# Patient Record
Sex: FEMALE | Race: WHITE | NOT HISPANIC OR LATINO | Employment: FULL TIME | URBAN - METROPOLITAN AREA
[De-identification: names, ages, dates, MRNs, and addresses within clinical notes are randomized per-mention and may not be internally consistent; named-entity substitution may affect disease eponyms.]

---

## 2017-02-15 RX ORDER — DILTIAZEM HYDROCHLORIDE 360 MG/1
CAPSULE, EXTENDED RELEASE ORAL DAILY
Qty: 90 CAPSULE | Refills: 0 | Status: CANCELLED | OUTPATIENT
Start: 2017-02-15

## 2017-02-15 RX ORDER — LEVOTHYROXINE SODIUM 125 UG/1
125 TABLET ORAL DAILY
Qty: 90 TABLET | Refills: 0 | Status: CANCELLED | OUTPATIENT
Start: 2017-02-15

## 2017-02-15 RX ORDER — HYDROCHLOROTHIAZIDE 25 MG/1
25 TABLET ORAL DAILY
Qty: 90 TABLET | Refills: 0 | Status: CANCELLED | OUTPATIENT
Start: 2017-02-15

## 2017-02-15 NOTE — TELEPHONE ENCOUNTER
I have not seen this patient for these conditions. She was seen here for a hip issue and vertigo because she was unable to get an appointment with her PCP at [facility] at the time. I am happy to see her in the clinic if she would like to make an appointment to address these issues. She is due for Pap, mammogram and health maintenance update also.   She will need to have lab tests and appointments in order to address appropriately. Kristen Kehr PA-C

## 2017-02-15 NOTE — TELEPHONE ENCOUNTER
It does not look like Kristen Kehr PA has prescribed the diltiazem or hctz in past.  I called pharmacy and they said pt advised them Kristen Kehr PA is her doctor and to send refills to her.  Concha Martin RN

## 2017-03-01 PROBLEM — Z86.0100 HISTORY OF COLONIC POLYPS: Status: ACTIVE | Noted: 2017-03-01

## 2017-03-22 ENCOUNTER — TELEPHONE (OUTPATIENT)
Dept: FAMILY MEDICINE | Facility: CLINIC | Age: 57
End: 2017-03-22

## 2017-03-22 NOTE — LETTER
[facility]  [address] [facility] [address] [phone]      March 22, 2017      Wendy Dodd  [address] [facility] [address]              Dear Wendy,      Our records indicate that you have not scheduled for a(n)annual female exam/pap which was recommended by your health care team. Monitoring and managing your preventative and chronic health conditions are very important to us.       If you have received your health care elsewhere, please provide us with that information so it can be documented in your chart.    Please call [phone] or message us through your [employer] account to schedule an appointment or provide information for your chart.     I look forward to seeing you and working with you on your health care needs.     Sincerely,       Demetrius SOLIS MA          *If you have already scheduled an appointment, please disregard this reminder

## 2017-03-22 NOTE — TELEPHONE ENCOUNTER
Panel Management Review      Patient has the following on her problem list: None      Composite cancer screening  Chart review shows that this patient is due/due soon for the following Pap Smear  Summary:    Patient is due/failing the following:   PAP and PHYSICAL    Action needed:   Patient needs office visit for physical/pap.    Type of outreach:    Sent letter.    Questions for provider review:    None                                                                                                                                    Demetrius SOLIS MA       Chart routed to close .

## 2017-03-27 ENCOUNTER — OFFICE VISIT (OUTPATIENT)
Dept: FAMILY MEDICINE | Facility: CLINIC | Age: 57
End: 2017-03-27
Payer: COMMERCIAL

## 2017-03-27 VITALS
TEMPERATURE: 98.2 F | OXYGEN SATURATION: 98 % | SYSTOLIC BLOOD PRESSURE: 120 MMHG | HEIGHT: 68 IN | DIASTOLIC BLOOD PRESSURE: 79 MMHG | HEART RATE: 72 BPM | WEIGHT: 246 LBS | BODY MASS INDEX: 37.28 KG/M2

## 2017-03-27 DIAGNOSIS — E03.9 HYPOTHYROIDISM, UNSPECIFIED TYPE: ICD-10-CM

## 2017-03-27 DIAGNOSIS — Z13.220 LIPID SCREENING: ICD-10-CM

## 2017-03-27 DIAGNOSIS — Z11.59 NEED FOR HEPATITIS C SCREENING TEST: ICD-10-CM

## 2017-03-27 DIAGNOSIS — Z00.00 ENCOUNTER FOR ROUTINE ADULT HEALTH EXAMINATION WITHOUT ABNORMAL FINDINGS: Primary | ICD-10-CM

## 2017-03-27 DIAGNOSIS — Z12.4 SCREENING FOR MALIGNANT NEOPLASM OF CERVIX: ICD-10-CM

## 2017-03-27 DIAGNOSIS — R73.01 IMPAIRED FASTING GLUCOSE: ICD-10-CM

## 2017-03-27 DIAGNOSIS — Z12.31 VISIT FOR SCREENING MAMMOGRAM: ICD-10-CM

## 2017-03-27 DIAGNOSIS — I10 BENIGN ESSENTIAL HYPERTENSION: ICD-10-CM

## 2017-03-27 PROBLEM — E66.09 NON MORBID OBESITY DUE TO EXCESS CALORIES: Status: ACTIVE | Noted: 2017-03-27

## 2017-03-27 LAB
ALBUMIN SERPL-MCNC: 3.9 G/DL (ref 3.4–5)
ANION GAP SERPL CALCULATED.3IONS-SCNC: 4 MMOL/L (ref 3–14)
BUN SERPL-MCNC: 14 MG/DL (ref 7–30)
CALCIUM SERPL-MCNC: 10.3 MG/DL (ref 8.5–10.1)
CHLORIDE SERPL-SCNC: 103 MMOL/L (ref 94–109)
CHOLEST SERPL-MCNC: 181 MG/DL
CO2 SERPL-SCNC: 32 MMOL/L (ref 20–32)
CREAT SERPL-MCNC: 0.63 MG/DL (ref 0.52–1.04)
GFR SERPL CREATININE-BSD FRML MDRD: ABNORMAL ML/MIN/1.7M2
GLUCOSE SERPL-MCNC: 152 MG/DL (ref 70–99)
HDLC SERPL-MCNC: 59 MG/DL
LDLC SERPL CALC-MCNC: 91 MG/DL
NONHDLC SERPL-MCNC: 122 MG/DL
PHOSPHATE SERPL-MCNC: 3 MG/DL (ref 2.5–4.5)
POTASSIUM SERPL-SCNC: 4.5 MMOL/L (ref 3.4–5.3)
SODIUM SERPL-SCNC: 139 MMOL/L (ref 133–144)
TRIGL SERPL-MCNC: 155 MG/DL
TSH SERPL DL<=0.005 MIU/L-ACNC: 1.8 MU/L (ref 0.4–4)

## 2017-03-27 PROCEDURE — 87624 HPV HI-RISK TYP POOLED RSLT: CPT | Performed by: PHYSICIAN ASSISTANT

## 2017-03-27 PROCEDURE — G0145 SCR C/V CYTO,THINLAYER,RESCR: HCPCS | Performed by: PHYSICIAN ASSISTANT

## 2017-03-27 PROCEDURE — 99396 PREV VISIT EST AGE 40-64: CPT | Performed by: PHYSICIAN ASSISTANT

## 2017-03-27 PROCEDURE — 86803 HEPATITIS C AB TEST: CPT | Performed by: PHYSICIAN ASSISTANT

## 2017-03-27 PROCEDURE — 36415 COLL VENOUS BLD VENIPUNCTURE: CPT | Performed by: PHYSICIAN ASSISTANT

## 2017-03-27 PROCEDURE — 84443 ASSAY THYROID STIM HORMONE: CPT | Performed by: PHYSICIAN ASSISTANT

## 2017-03-27 PROCEDURE — 80061 LIPID PANEL: CPT | Performed by: PHYSICIAN ASSISTANT

## 2017-03-27 PROCEDURE — 80069 RENAL FUNCTION PANEL: CPT | Performed by: PHYSICIAN ASSISTANT

## 2017-03-27 NOTE — PROGRESS NOTES
SUBJECTIVE:     CC: Wendy Dodd is an 56 year old woman who presents for preventive health visit.     She was formerly established with a primary provider in [place] and they were prescribing her medication. She is due for a routine exam today and will eventually need refills of her medications and switching to [place] for primary care.       Healthy Habits:    Do you get at least three servings of calcium containing foods daily (dairy, green leafy vegetables, etc.)? yes and no, taking calcium and/or vitamin D supplement:     Amount of exercise or daily activities, outside of work: none    Problems taking medications regularly No    Medication side effects: No    Have you had an eye exam in the past two years? no    Do you see a dentist twice per year? yes    Do you have sleep apnea, excessive snoring or daytime drowsiness?          Today's PHQ-2 Score:   PHQ-2 ( 1999 Pf[employer]) 3/27/2017 4/21/2016   Q1: Little interest or pleasure in doing things 0 0   Q2: Feeling down, depressed or hopeless 0 0   PHQ-2 Score 0 0       Abuse: Current or Past(Physical, Sexual or Emotional)- No  Do you feel safe in your environment - Yes    Social History   Substance Use Topics     Smoking status: Current Every Day Smoker     Types: Other     Smokeless tobacco: Never Used      Comment: Using E-cigs to quit     Alcohol use 0.0 oz/week     0 Standard drinks or equivalent per week     The patient does not drink >3 drinks per day nor >7 drinks per week.    No results for input(s): CHOL, HDL, LDL, TRIG, CHOLHDLRATIO, NHDL in the last 29220 hours.    Reviewed orders with patient.  Reviewed health maintenance and updated orders accordingly - Yes    Mammo Decision Support:  Patient over age 50, mutual decision to screen reflected in health maintenance.    Pertinent mammograms are reviewed under the imaging tab.  History of abnormal Pap smear: NO - age 30- 65 PAP every 3 years recommended  Last 3 Pap Results: No results found for:  PAP    Reviewed and updated as needed this visit by clinical staff  Tobacco  Allergies  Meds  Med Hx  Surg Hx  Fam Hx  Soc Hx        Reviewed and updated as needed this visit by Provider        History reviewed. No pertinent past medical history.   History reviewed. No pertinent surgical history.    ROS:  C: NEGATIVE for fever, chills, change in weight  I: NEGATIVE for worrisome rashes, moles or lesions  E: NEGATIVE for vision changes or irritation  ENT: NEGATIVE for ear, mouth and throat problems  R: NEGATIVE for significant cough or SOB  B: NEGATIVE for masses, tenderness or discharge  CV: NEGATIVE for chest pain, palpitations or peripheral edema  GI: NEGATIVE for nausea, abdominal pain, heartburn, or change in bowel habits  : NEGATIVE for unusual urinary or vaginal symptoms. No vaginal bleeding.  M: NEGATIVE for significant arthralgias or myalgia  N: NEGATIVE for weakness, dizziness or paresthesias  E: NEGATIVE for temperature intolerance, skin/hair changes  P: NEGATIVE for changes in mood or affect     BP Readings from Last 3 Encounters:   03/27/17 120/79   11/30/16 116/81   04/21/16 114/75    Wt Readings from Last 3 Encounters:   03/27/17 246 lb (111.6 kg)   11/30/16 237 lb (107.5 kg)   04/21/16 225 lb (102.1 kg)                  Patient Active Problem List   Diagnosis     Advanced directives, counseling/discussion     Diverticulosis of large intestine     External hemorrhoids     History of colonic polyps     Benign essential hypertension     Hypothyroidism, unspecified type     History reviewed. No pertinent surgical history.    Social History   Substance Use Topics     Smoking status: Current Every Day Smoker     Types: Other     Smokeless tobacco: Never Used      Comment: Using E-cigs to quit     Alcohol use 0.0 oz/week     0 Standard drinks or equivalent per week     Family History   Problem Relation Age of Onset     Colon Cancer Father      Other Cancer Father      liver     Other Cancer Brother   "    Skin Cancer Sister          Current Outpatient Prescriptions   Medication Sig Dispense Refill     levothyroxine (SYNTHROID, LEVOTHROID) 125 MCG tablet Take 125 mcg by mouth daily       folic acid 800 MCG TABS        IRON PO        diltiazem (TIAZAC) 360 MG 24 hr ER beaded capsule Take by mouth daily       HYDROCHLOROTHIAZIDE PO Take 25 mg by mouth       lisinopril-hydrochlorothiazide (PRINZIDE,ZESTORETIC) 10-12.5 MG per tablet Take 1 tablet by mouth daily       OBJECTIVE:     /79 (Cuff Size: Adult Large)  Pulse 72  Temp 98.2  F (36.8  C) (Oral)  Ht 5' 8\" (1.727 m)  Wt 246 lb (111.6 kg)  SpO2 98%  BMI 37.4 kg/m2  EXAM:  GENERAL APPEARANCE: healthy, alert and no distress  EYES: Eyes grossly normal to inspection, PERRL and conjunctivae and sclerae normal  HENT: ear canals and TM's normal, nose and mouth without ulcers or lesions, oropharynx clear and oral mucous membranes moist  NECK: no adenopathy, no asymmetry, masses, or scars and thyroid normal to palpation  RESP: lungs clear to auscultation - no rales, rhonchi or wheezes  BREAST: normal without masses, tenderness or nipple discharge and no palpable axillary masses or adenopathy  CV: regular rate and rhythm, normal S1 S2, no S3 or S4, no murmur, click or rub, no peripheral edema and peripheral pulses strong  ABDOMEN: soft, nontender, no hepatosplenomegaly, no masses and bowel sounds normal   (female): normal female external genitalia, normal urethral meatus, vaginal mucosal atrophy noted, normal cervix, adnexae, and uterus without masses or abnormal discharge  MS: no musculoskeletal defects are noted and gait is age appropriate without ataxia  SKIN: no suspicious lesions or rashes  NEURO: Normal strength and tone, sensory exam grossly normal, mentation intact and speech normal  PSYCH: mentation appears normal and affect normal/bright    ASSESSMENT/PLAN:     1. Encounter for routine adult health examination without abnormal findings  Health " "maintenance reviewed and updated.    2. Visit for screening mammogram  - MA SCREENING DIGITAL BILAT - Future  (s+30); Future    3. Need for hepatitis C screening test  - Hepatitis C Screen Reflex to HCV RNA Quant and Genotype    4. Screening for malignant neoplasm of cervix  - Pap imaged thin layer screen with HPV - recommended age 30 - 65  - HPV High Risk Types DNA Cervical    5. Benign essential hypertension  Due for renal panel. She does not need refills of her medications today.   She will contact the pharmacy for refills. Blood pressure is controlled.   She is taking HCTZ, lisinopril and diltiazem  - Renal panel    6. Hypothyroidism, unspecified type  Due for thyroid testing today. Declines refills of her medication at this time since she has medication at home right now.   - TSH with free T4 reflex    7. Lipid screening  - LIPID REFLEX TO DIRECT LDL PANEL    COUNSELING:   Reviewed preventive health counseling, as reflected in patient instructions       Regular exercise       Healthy diet/nutrition       Colon cancer screening       reports that she has been smoking Other.  She has never used smokeless tobacco.    Estimated body mass index is 37.4 kg/(m^2) as calculated from the following:    Height as of this encounter: 5' 8\" (1.727 m).    Weight as of this encounter: 246 lb (111.6 kg).   Weight management plan: Discussed healthy diet and exercise guidelines and patient will follow up in 12 months in clinic to re-evaluate.    Counseling Resources:  ATP IV Guidelines  Pooled Cohorts Equation Calculator  Breast Cancer Risk Calculator  FRAX Risk Assessment  ICSI Preventive Guidelines  Dietary Guidelines for Americans, 2010  USDA's MyPlate  ASA Prophylaxis  Lung CA Screening    Kristen M. Kehr, PA-C  [facility]  "

## 2017-03-27 NOTE — NURSING NOTE
"Chief Complaint   Patient presents with     Physical       Initial /79 (Cuff Size: Adult Large)  Pulse 72  Temp 98.2  F (36.8  C) (Oral)  Ht 5' 8\" (1.727 m)  Wt 246 lb (111.6 kg)  SpO2 98%  BMI 37.4 kg/m2 Estimated body mass index is 37.4 kg/(m^2) as calculated from the following:    Height as of this encounter: 5' 8\" (1.727 m).    Weight as of this encounter: 246 lb (111.6 kg).  Medication Reconciliation: complete    STEPHANIE Raymundo MA    "

## 2017-03-27 NOTE — MR AVS SNAPSHOT
After Visit Summary   3/27/2017    Wendy Dodd    MRN: [id number]           Patient Information     Date Of Birth          1960        Visit Information        Provider Department      3/27/2017 9:20 AM Kehr, Kristen M, PA-C [facility]        Today's Diagnoses     Encounter for routine adult health examination without abnormal findings    -  1    Visit for screening mammogram        Need for hepatitis C screening test        Screening for malignant neoplasm of cervix        Benign essential hypertension        Hypothyroidism, unspecified type        Lipid screening          Care Instructions      Preventive Health Recommendations  Female Ages 50 - 64    Yearly exam: See your health care provider every year in order to  o Review health changes.   o Discuss preventive care.    o Review your medicines if your doctor has prescribed any.      Get a Pap test every three years (unless you have an abnormal result and your provider advises testing more often).    If you get Pap tests with HPV test, you only need to test every 5 years, unless you have an abnormal result.     You do not need a Pap test if your uterus was removed (hysterectomy) and you have not had cancer.    You should be tested each year for STDs (sexually transmitted diseases) if you're at risk.     Have a mammogram every 1 to 2 years.    Have a colonoscopy at age 50, or have a yearly FIT test (stool test). These exams screen for colon cancer.      Have a cholesterol test every 5 years, or more often if advised.    Have a diabetes test (fasting glucose) every three years. If you are at risk for diabetes, you should have this test more often.     If you are at risk for osteoporosis (brittle bone disease), think about having a bone density scan (DEXA).    Shots: Get a flu shot each year. Get a tetanus shot every 10 years.    Nutrition:     Eat at least 5 servings of fruits and vegetables each day.    Eat whole-grain  "bread, whole-wheat pasta and brown rice instead of white grains and rice.    Talk to your provider about Calcium and Vitamin D.     Lifestyle    Exercise at least 150 minutes a week (30 minutes a day, 5 days a week). This will help you control your weight and prevent disease.    Limit alcohol to one drink per day.    No smoking.     Wear sunscreen to prevent skin cancer.     See your dentist every six months for an exam and cleaning.    See your eye doctor every 1 to 2 years.      Schedule mammogram    Fasting lab tests done today        Follow-ups after your visit        Future tests that were ordered for you today     Open Future Orders        Priority Expected Expires Ordered    MA SCREENING DIGITAL BILAT - Future  (s+30) Routine  3/27/2018 3/27/2017            Who to contact     If you have questions or need follow up information about today's clinic visit or your schedule please contact [facility] directly at [phone].  Normal or non-critical lab and imaging results will be communicated to you by [employer], letter or phone within 4 business days after the clinic has received the results. If you do not hear from us within 7 days, please contact the clinic through [employer] or phone. If you have a critical or abnormal lab result, we will notify you by phone as soon as possible.  Submit refill requests through [employer] or call your pharmacy and they will forward the refill request to us. Please allow 3 business days for your refill to be completed.          Additional Information About Your Visit        RSensharPlanG Information     [employer] lets you send messages to your doctor, view your test results, renew your prescriptions, schedule appointments and more. To sign up, go to [email or web address] . Click on \"Log in\" on the left side of the screen, which will take you to the Welcome page. Then click on \"Sign up Now\" on the right side of the page.     You will be asked to enter the access code listed " "below, as well as some personal information. Please follow the directions to create your username and password.     Your access code is: [email or web address]  Expires: 2017  9:46 AM     Your access code will  in 90 days. If you need help or a new code, please call your [place] clinic or [phone].        Care EveryWhere ID     This is your Care EveryWhere ID. This could be used by other organizations to access your [place] medical records  [id number]        Your Vitals Were     Pulse Temperature Height Pulse Oximetry BMI (Body Mass Index)       72 98.2  F (36.8  C) (Oral) 5' 8\" (1.727 m) 98% 37.4 kg/m2        Blood Pressure from Last 3 Encounters:   17 120/79   16 116/81   16 114/75    Weight from Last 3 Encounters:   17 246 lb (111.6 kg)   16 237 lb (107.5 kg)   16 225 lb (102.1 kg)              We Performed the Following     Hepatitis C Screen Reflex to HCV RNA Quant and Genotype     HPV High Risk Types DNA Cervical     LIPID REFLEX TO DIRECT LDL PANEL     Pap imaged thin layer screen with HPV - recommended age 30 - 65     Renal panel     TSH with free T4 reflex        Primary Care Provider Office Phone # Fax #    Kristen M Kehr, PA-C [phone]       [facility] [address] [facility] [address]        Thank you!     Thank you for choosing [facility]  for your care. Our goal is always to provide you with excellent care. Hearing back from our patients is one way we can continue to improve our services. Please take a few minutes to complete the written survey that you may receive in the mail after your visit with us. Thank you!             Your Updated Medication List - Protect others around you: Learn how to safely use, store and throw away your medicines at www.disposemymeds.org.          This list is accurate as of: 3/27/17  9:46 AM.  Always use your most recent med list.                   Brand Name Dispense Instructions " for use    diltiazem 360 MG 24 hr capsule    TIAZAC     Take by mouth daily       folic acid 800 MCG Tabs          HYDROCHLOROTHIAZIDE PO      Take 25 mg by mouth       IRON PO          levothyroxine 125 MCG tablet    SYNTHROID/LEVOTHROID     Take 125 mcg by mouth daily       lisinopril-hydrochlorothiazide 10-12.5 MG per tablet    PRINZIDE/ZESTORETIC     Take 1 tablet by mouth daily

## 2017-03-27 NOTE — LETTER
April 4, 2017    Wendy Dodd  [address]   [place] [address]    Dear Wendy,  We are happy to inform you that your PAP smear result from 3/27/17 is normal.  We are now able to do a follow up test on PAP smears. The DNA test is for HPV (Human Papilloma Virus). Cervical cancer is closely linked with certain types of HPV. Your result showed no evidence of high risk HPV.  Therefore we recommend you return in 3 years for your next pap smear.  You will still need to return to the clinic every year for an annual exam and other preventive tests.  Please contact the clinic at [phone] with any questions.  Sincerely,    Kristen M. Kehr, PA-C/kylee

## 2017-03-27 NOTE — PATIENT INSTRUCTIONS

## 2017-03-28 DIAGNOSIS — R73.01 ELEVATED FASTING BLOOD SUGAR: Primary | ICD-10-CM

## 2017-03-28 LAB — HCV AB SERPL QL IA: NORMAL

## 2017-03-28 NOTE — PROGRESS NOTES
I ordered an A1C to see if this could be added to lab tests drawn 3/27. Could you please check with the lab to see if this can be done.  Otherwise she will need to come back for this test.

## 2017-03-28 NOTE — PROGRESS NOTES
Please contact this patient and let her know that all results look good except for her blood sugar. It is elevated at 152. She will need another test to check for diabetes called a hemoglobin A1C. She has a mammogram on 4/13 and I think it would be fine to coordinate with that if it is here in [place]. She does not need to be fasting, but will need a lab only appointment that day. Thank you. Kristen Kehr PA-C  March 28, 2017

## 2017-03-29 LAB
COPATH REPORT: NORMAL
PAP: NORMAL

## 2017-03-30 LAB
FINAL DIAGNOSIS: NORMAL
HPV HR 12 DNA CVX QL NAA+PROBE: NEGATIVE
HPV16 DNA SPEC QL NAA+PROBE: NEGATIVE
HPV18 DNA SPEC QL NAA+PROBE: NEGATIVE
SPECIMEN DESCRIPTION: NORMAL

## 2017-04-13 ENCOUNTER — RADIANT APPOINTMENT (OUTPATIENT)
Dept: MAMMOGRAPHY | Facility: CLINIC | Age: 57
End: 2017-04-13
Attending: PHYSICIAN ASSISTANT
Payer: COMMERCIAL

## 2017-04-13 DIAGNOSIS — R73.01 IMPAIRED FASTING GLUCOSE: ICD-10-CM

## 2017-04-13 DIAGNOSIS — Z12.31 VISIT FOR SCREENING MAMMOGRAM: ICD-10-CM

## 2017-04-13 LAB — HBA1C MFR BLD: 8.9 % (ref 4.3–6)

## 2017-04-13 PROCEDURE — 83036 HEMOGLOBIN GLYCOSYLATED A1C: CPT | Performed by: PHYSICIAN ASSISTANT

## 2017-04-13 PROCEDURE — 36415 COLL VENOUS BLD VENIPUNCTURE: CPT | Performed by: PHYSICIAN ASSISTANT

## 2017-04-13 PROCEDURE — G0202 SCR MAMMO BI INCL CAD: HCPCS | Mod: TC

## 2017-04-17 NOTE — PROGRESS NOTES
Please contact Wendy and let her know that her Hemoglobin A1C test that she had done shows that she has type 2 diabetes.   Please help her make an appointment with me to discuss medications and follow up.   Thank you.   Kristen Kehr PA-C

## 2017-04-20 ENCOUNTER — OFFICE VISIT (OUTPATIENT)
Dept: FAMILY MEDICINE | Facility: CLINIC | Age: 57
End: 2017-04-20
Payer: COMMERCIAL

## 2017-04-20 VITALS
DIASTOLIC BLOOD PRESSURE: 72 MMHG | BODY MASS INDEX: 37.4 KG/M2 | HEART RATE: 87 BPM | WEIGHT: 246 LBS | SYSTOLIC BLOOD PRESSURE: 116 MMHG | TEMPERATURE: 98.6 F | OXYGEN SATURATION: 95 %

## 2017-04-20 DIAGNOSIS — Z23 NEED FOR PNEUMOCOCCAL VACCINE: ICD-10-CM

## 2017-04-20 DIAGNOSIS — E11.9 TYPE 2 DIABETES MELLITUS WITHOUT COMPLICATION, WITHOUT LONG-TERM CURRENT USE OF INSULIN (H): Primary | ICD-10-CM

## 2017-04-20 PROCEDURE — 90732 PPSV23 VACC 2 YRS+ SUBQ/IM: CPT | Performed by: PHYSICIAN ASSISTANT

## 2017-04-20 PROCEDURE — 99214 OFFICE O/P EST MOD 30 MIN: CPT | Mod: 25 | Performed by: PHYSICIAN ASSISTANT

## 2017-04-20 PROCEDURE — 90471 IMMUNIZATION ADMIN: CPT | Performed by: PHYSICIAN ASSISTANT

## 2017-04-20 RX ORDER — METFORMIN HCL 500 MG
500 TABLET, EXTENDED RELEASE 24 HR ORAL
Qty: 180 TABLET | Refills: 1 | Status: SHIPPED | OUTPATIENT
Start: 2017-04-20 | End: 2017-04-21

## 2017-04-20 RX ORDER — ATORVASTATIN CALCIUM 10 MG/1
10 TABLET, FILM COATED ORAL DAILY
Qty: 90 TABLET | Refills: 1 | Status: SHIPPED | OUTPATIENT
Start: 2017-04-20 | End: 2017-10-18

## 2017-04-20 RX ORDER — LANCETS
EACH MISCELLANEOUS
Qty: 1 BOX | Refills: 11 | Status: SHIPPED | OUTPATIENT
Start: 2017-04-20 | End: 2018-03-12

## 2017-04-20 NOTE — MR AVS SNAPSHOT
After Visit Summary   4/20/2017    Wendy Dodd    MRN: [id number]           Patient Information     Date Of Birth          1960        Visit Information        Provider Department      4/20/2017 8:50 AM Kehr, Kristen M, PA-C [facility]        Today's Diagnoses     Type 2 diabetes mellitus without complication, without long-term current use of insulin (H)    -  1    Need for pneumococcal vaccine          Care Instructions      Appointment with Kristen Kehr PA-C in 3 months    Start the metformin twice daily   Lipitor once daily  Aspirin once daily      Fasting lab tests prior to appointment in 3 months.     MyChart sign up.       Pneumonia vaccine today      Make an appointment with Taylor in Diabetes Education          Follow-ups after your visit        Your next 10 appointments already scheduled     Jul 20, 2017  8:30 AM CDT   Office Visit with Kristen M Kehr, PA-C   [facility] ([facility]    [address] [address] [facility] [address] [phone]           Bring a current list of meds and any records pertaining to this visit.  For Physicals, please bring immunization records and any forms needing to be filled out.  Please arrive 10 minutes early to complete paperwork.              Who to contact     If you have questions or need follow up information about today's clinic visit or your schedule please contact [facility] directly at [phone].  Normal or non-critical lab and imaging results will be communicated to you by MyChart, letter or phone within 4 business days after the clinic has received the results. If you do not hear from us within 7 days, please contact the clinic through MyChart or phone. If you have a critical or abnormal lab result, we will notify you by phone as soon as possible.  Submit refill requests through [facility] or call your pharmacy and they will forward the refill request to us. Please allow 3 business  days for your refill to be completed.          Additional Information About Your Visit        [employer] Information     [employer] gives you secure access to your electronic health record. If you see a primary care provider, you can also send messages to your care team and make appointments. If you have questions, please call your primary care clinic.  If you do not have a primary care provider, please call [phone] and they will assist you.        Care EveryWhere ID     This is your Care EveryWhere ID. This could be used by other organizations to access your [place] medical records  [id number]        Your Vitals Were     Pulse Temperature Pulse Oximetry BMI (Body Mass Index)          87 98.6  F (37  C) (Oral) 95% 37.4 kg/m2         Blood Pressure from Last 3 Encounters:   04/20/17 116/72   03/27/17 120/79   11/30/16 116/81    Weight from Last 3 Encounters:   04/20/17 246 lb (111.6 kg)   03/27/17 246 lb (111.6 kg)   11/30/16 237 lb (107.5 kg)              We Performed the Following     ADMIN: Vaccine, Initial (25077)     Pneumococcal vaccine 23 valent PPSV23  (Pneumovax) [66989]          Today's Medication Changes          These changes are accurate as of: 4/20/17  3:31 PM.  If you have any questions, ask your nurse or doctor.               Start taking these medicines.        Dose/Directions    atorvastatin 10 MG tablet   Commonly known as:  LIPITOR   Used for:  Type 2 diabetes mellitus without complication, without long-term current use of insulin (H)   Started by:  Kehr, Kristen M, PA-C        Dose:  10 mg   Take 1 tablet (10 mg) by mouth daily   Quantity:  90 tablet   Refills:  1       blood glucose monitoring lancets   Started by:  Kehr, Kristen M, PA-C        Use to test blood sugar 2 times daily or as directed.   Quantity:  1 Box   Refills:  11       blood glucose monitoring test strip   Commonly known as:  ACCU-CHEK RENY PLUS   Started by:  Kehr, Kristen M, PA-C        Use to test blood sugar 2 times  daily or as directed.   Quantity:  200 strip   Refills:  11       metFORMIN 500 MG 24 hr tablet   Commonly known as:  GLUCOPHAGE-XR   Used for:  Type 2 diabetes mellitus without complication, without long-term current use of insulin (H)   Started by:  Kehr, Kristen M, PA-C        Dose:  500 mg   Take 1 tablet (500 mg) by mouth daily (with dinner)   Quantity:  180 tablet   Refills:  1            Where to get your medicines      These medications were sent to [id number] - [place] - [address]  [address] [facility] [address]     Phone:  [phone]     atorvastatin 10 MG tablet    blood glucose monitoring lancets    blood glucose monitoring test strip    metFORMIN 500 MG 24 hr tablet                Primary Care Provider Office Phone # Fax #    Kristen M Kehr, PA-C [phone]       [facility] [address] [address] [facility] [address]        Thank you!     Thank you for choosing [facility]  for your care. Our goal is always to provide you with excellent care. Hearing back from our patients is one way we can continue to improve our services. Please take a few minutes to complete the written survey that you may receive in the mail after your visit with us. Thank you!             Your Updated Medication List - Protect others around you: Learn how to safely use, store and throw away your medicines at www.disposemymeds.org.          This list is accurate as of: 4/20/17  3:31 PM.  Always use your most recent med list.                   Brand Name Dispense Instructions for use    aspirin 81 MG tablet     30 tablet    Take 1 tablet (81 mg) by mouth daily       atorvastatin 10 MG tablet    LIPITOR    90 tablet    Take 1 tablet (10 mg) by mouth daily       blood glucose monitoring lancets     1 Box    Use to test blood sugar 2 times daily or as directed.       blood glucose monitoring test strip    ACCU-CHEK RENY PLUS    200 strip    Use to test blood sugar 2 times  daily or as directed.       diltiazem 360 MG 24 hr capsule    TIAZAC     Take by mouth daily       folic acid 800 MCG Tabs          HYDROCHLOROTHIAZIDE PO      Take 25 mg by mouth       IRON PO          levothyroxine 125 MCG tablet    SYNTHROID/LEVOTHROID     Take 125 mcg by mouth daily       lisinopril-hydrochlorothiazide 10-12.5 MG per tablet    PRINZIDE/ZESTORETIC     Take 1 tablet by mouth daily       metFORMIN 500 MG 24 hr tablet    GLUCOPHAGE-XR    180 tablet    Take 1 tablet (500 mg) by mouth daily (with dinner)

## 2017-04-20 NOTE — PROGRESS NOTES
Met with Wendy with warm handoff to show her how to use the [employer] connect meter.  And to briefly discuss diabetes.  She is to make an appointment. To see me in the future.    Taylor Briggs RN/MONTY  [place] [facility] Educator

## 2017-04-20 NOTE — PATIENT INSTRUCTIONS
Appointment with Kristen Kehr PA-C in 3 months    Start the metformin twice daily   Lipitor once daily  Aspirin once daily      Fasting lab tests prior to appointment in 3 months.     MyChart sign up.       Pneumonia vaccine today      Make an appointment with Taylor in Diabetes Education

## 2017-04-20 NOTE — PROGRESS NOTES
SUBJECTIVE:                                                    Wendy Dodd is a 56 year old female who presents to clinic today for the following health issues:      Discuss diabetes  Wendy was recently seen for her routine exam and fasting blood sugar was elevated. She returned for an A1C and that was also high. She is here to discuss treatment for type 2 diabetes.       Problem list and histories reviewed & adjusted, as indicated.  Additional history: as documented    Patient Active Problem List   Diagnosis     Advanced directives, counseling/discussion     Diverticulosis of large intestine     External hemorrhoids     History of colonic polyps     Benign essential hypertension     Hypothyroidism, unspecified type     Non morbid obesity due to excess calories     History reviewed. No pertinent surgical history.    Social History   Substance Use Topics     Smoking status: Current Every Day Smoker     Types: Other     Smokeless tobacco: Never Used      Comment: Using E-cigs to quit     Alcohol use 0.0 oz/week     0 Standard drinks or equivalent per week     Family History   Problem Relation Age of Onset     Colon Cancer Father      Other Cancer Father      liver     Other Cancer Brother      Skin Cancer Sister          Allergies   Allergen Reactions     Penicillins      Sulfa Drugs      Recent Labs   Lab Test  04/13/17   1654  03/27/17   1027  04/21/16   1228   A1C  8.9*   --    --    LDL   --   91   --    HDL   --   59   --    TRIG   --   155*   --    CR   --   0.63  0.64   GFRESTIMATED   --   >90  Non  GFR Calc    >90  Non  GFR Calc     GFRESTBLACK   --   >90   GFR Calc    >90   GFR Calc     POTASSIUM   --   4.5  4.0   TSH   --   1.80  0.61        Reviewed and updated as needed this visit by clinical staff  Tobacco  Allergies  Meds  Med Hx  Surg Hx  Fam Hx  Soc Hx      Reviewed and updated as needed this visit by Provider          ROS:  Constitutional, HEENT, cardiovascular, pulmonary, gi and gu systems are negative, except as otherwise noted.    OBJECTIVE:                                                    /72 (Cuff Size: Adult Large)  Pulse 87  Temp 98.6  F (37  C) (Oral)  Wt 246 lb (111.6 kg)  SpO2 95%  BMI 37.4 kg/m2  Body mass index is 37.4 kg/(m^2).  GENERAL: healthy, alert and no distress  MS: no gross musculoskeletal defects noted, no edema  SKIN: no suspicious lesions or rashes  PSYCH: mentation appears normal, affect normal/bright    Diagnostic Test Results:  See above     ASSESSMENT/PLAN:                                                      1. Type 2 diabetes mellitus without complication, without long-term current use of insulin (H)  She will start metformin  mg twice daily with her meals.   lipitor also prescribed along with aspirin.   Side effects and how to take the medication discussed.  Most of appointment time taken today for discussion of type 2 diabetes, treatment options and goals. She and her  have decided to start a healthy lifestyle change with diet and exercise and he is along with her today to learn about diabetes.   She was able to meet with Taylor Briggs from Diabetes Education today and get a meter. She will start monitoring her blood sugars and make an appointment for comprehensive training also.   Plan follow up in 3 months.   - atorvastatin (LIPITOR) 10 MG tablet; Take 1 tablet (10 mg) by mouth daily  Dispense: 90 tablet; Refill: 1  - aspirin 81 MG tablet; Take 1 tablet (81 mg) by mouth daily  Dispense: 30 tablet; Refill: 11  -metformin  mg     2. Need for pneumococcal vaccine  - Pneumococcal vaccine 23 valent PPSV23  (Pneumovax) [75831]  - ADMIN: Vaccine, Initial (03009)    Kristen M. Kehr, PA-C  [facility]      Screening Questionnaire for Adult Immunization    Are you sick today?   No   Do you have allergies to medications, food, a vaccine component or latex?    Yes,meds   Have you ever had a serious reaction after receiving a vaccination?   No   Do you have a long-term health problem with heart disease, lung disease, asthma, kidney disease, metabolic disease (e.g. diabetes), anemia, or other blood disorder?   No   Do you have cancer, leukemia, HIV/AIDS, or any other immune system problem?   No   In the past 3 months, have you taken medications that affect  your immune system, such as prednisone, other steroids, or anticancer drugs; drugs for the treatment of rheumatoid arthritis, Crohn s disease, or psoriasis; or have you had radiation treatments?   No   Have you had a seizure, or a brain or other nervous system problem?   No   During the past year, have you received a transfusion of blood or blood     products, or been given immune (gamma) globulin or antiviral drug?   No   For women: Are you pregnant or is there a chance you could become        pregnant during the next month?   No   Have you received any vaccinations in the past 4 weeks?   No     Immunization questionnaire was positive for at least one answer.  Notified Kehr,Kristen PA-C.      MNVFC doesn't apply on this patient    Per orders of Kehr,Kristen PA-C injection of Pneumovax 23 given by Demetrius Raymundo. Patient instructed to remain in clinic for 20 minutes afterwards, and to report any adverse reaction to me immediately.       Screening performed by Demetrius Raymundo on 4/20/2017 at 9:55 AM.

## 2017-04-20 NOTE — NURSING NOTE
"Chief Complaint   Patient presents with     Diabetes     discuss       Initial /72 (Cuff Size: Adult Large)  Pulse 87  Temp 98.6  F (37  C) (Oral)  Wt 246 lb (111.6 kg)  SpO2 95%  BMI 37.4 kg/m2 Estimated body mass index is 37.4 kg/(m^2) as calculated from the following:    Height as of 3/27/17: 5' 8\" (1.727 m).    Weight as of this encounter: 246 lb (111.6 kg).  Medication Reconciliation: complete    STEPHANIE Raymundo MA    "

## 2017-04-21 ENCOUNTER — MYC MEDICAL ADVICE (OUTPATIENT)
Dept: FAMILY MEDICINE | Facility: CLINIC | Age: 57
End: 2017-04-21

## 2017-04-21 DIAGNOSIS — E11.9 TYPE 2 DIABETES MELLITUS WITHOUT COMPLICATION, WITHOUT LONG-TERM CURRENT USE OF INSULIN (H): ICD-10-CM

## 2017-04-21 RX ORDER — METFORMIN HCL 500 MG
500 TABLET, EXTENDED RELEASE 24 HR ORAL 2 TIMES DAILY WITH MEALS
Qty: 180 TABLET | Refills: 1 | COMMUNITY
Start: 2017-04-21 | End: 2017-04-24

## 2017-04-21 NOTE — TELEPHONE ENCOUNTER
Per protocol, will route encounter to be cosigned by provider for Verbal Orders with read back.  Paz May RN

## 2017-04-23 PROBLEM — E11.9 TYPE 2 DIABETES MELLITUS WITHOUT COMPLICATION, WITHOUT LONG-TERM CURRENT USE OF INSULIN (H): Status: ACTIVE | Noted: 2017-04-23

## 2017-04-24 ENCOUNTER — TELEPHONE (OUTPATIENT)
Dept: FAMILY MEDICINE | Facility: CLINIC | Age: 57
End: 2017-04-24

## 2017-04-24 ENCOUNTER — MYC MEDICAL ADVICE (OUTPATIENT)
Dept: FAMILY MEDICINE | Facility: CLINIC | Age: 57
End: 2017-04-24

## 2017-04-24 DIAGNOSIS — E11.9 TYPE 2 DIABETES MELLITUS WITHOUT COMPLICATION, WITHOUT LONG-TERM CURRENT USE OF INSULIN (H): ICD-10-CM

## 2017-04-24 DIAGNOSIS — E03.9 HYPOTHYROIDISM, UNSPECIFIED TYPE: ICD-10-CM

## 2017-04-24 DIAGNOSIS — I10 BENIGN ESSENTIAL HYPERTENSION: Primary | ICD-10-CM

## 2017-04-24 RX ORDER — LISINOPRIL 10 MG/1
10 TABLET ORAL DAILY
Qty: 90 TABLET | Refills: 1 | Status: SHIPPED | OUTPATIENT
Start: 2017-04-24 | End: 2017-10-18

## 2017-04-24 RX ORDER — METFORMIN HCL 500 MG
500 TABLET, EXTENDED RELEASE 24 HR ORAL 2 TIMES DAILY WITH MEALS
Qty: 180 TABLET | Refills: 1 | Status: SHIPPED | OUTPATIENT
Start: 2017-04-24 | End: 2017-08-08

## 2017-04-24 RX ORDER — HYDROCHLOROTHIAZIDE 25 MG/1
25 TABLET ORAL DAILY
Qty: 90 TABLET | Refills: 1 | Status: SHIPPED | OUTPATIENT
Start: 2017-04-24 | End: 2017-11-16

## 2017-04-24 RX ORDER — DILTIAZEM HYDROCHLORIDE 360 MG/1
360 CAPSULE, EXTENDED RELEASE ORAL DAILY
Qty: 90 CAPSULE | Refills: 1 | Status: SHIPPED | OUTPATIENT
Start: 2017-04-24 | End: 2017-11-09

## 2017-04-24 RX ORDER — LEVOTHYROXINE SODIUM 125 UG/1
125 TABLET ORAL DAILY
Qty: 90 TABLET | Refills: 3 | Status: SHIPPED | OUTPATIENT
Start: 2017-04-24 | End: 2018-03-12

## 2017-04-24 NOTE — TELEPHONE ENCOUNTER
Patient requesting refills of medications due to refills being sent to previous provider.  Reviewed reconciliation medication for clarity of dosing.  Patient is needing refills for   Lisinopril 10 mg  HCTZ 25 mg  Diltiazem 360 mg  Levothyroxine 125 mcg    Information above is reviewed with Kristen Kehr, PA-C, Verbal Orders to refill medications x 6 mos.  Metformin prescription addressed previous telephone encounter and MyChart encounter.    The patient/parent agrees with the plan and verbalized good understanding.    Per protocol, will route encounter to be cosigned by provider for Verbal Orders.  Paz May RN

## 2017-04-24 NOTE — TELEPHONE ENCOUNTER
Pharmacy note: Metformin HCL  mg TB24 directions: take one tablet by mouth once daily with dinner--please send new Rx for 1 tab BID. Thanks      Demetrius SOLIS MA

## 2017-05-03 ENCOUNTER — ALLIED HEALTH/NURSE VISIT (OUTPATIENT)
Dept: EDUCATION SERVICES | Facility: OTHER | Age: 57
End: 2017-05-03
Payer: COMMERCIAL

## 2017-05-03 VITALS — WEIGHT: 243 LBS | BODY MASS INDEX: 36.95 KG/M2

## 2017-05-03 DIAGNOSIS — E11.9 TYPE 2 DIABETES MELLITUS WITHOUT COMPLICATION, WITHOUT LONG-TERM CURRENT USE OF INSULIN (H): Primary | ICD-10-CM

## 2017-05-03 PROCEDURE — G0108 DIAB MANAGE TRN  PER INDIV: HCPCS

## 2017-05-03 NOTE — MR AVS SNAPSHOT
After Visit Summary   5/3/2017    Wendy Dodd    MRN: [id number]           Patient Information     Date Of Birth          1960        Visit Information        Provider Department      5/3/2017 9:00 AM ER DIABETIC ED RESOURCE [facility]        Care Instructions    1.  Aim for 1 serving of fruit daily.  2.  Aim for 15 minutes of activity daily.  3.  Aim for 1 vegetable serving a day.    FOLLOW UP:  June 7 th @ 9:00 am    [facility] for the [facility]:  For Your Diabetes Education and Nutrition Appointments Call:  [phone]   For Diabetes Education or Nutrition Related Questions:   Phone: [phone]  E-mail: [email or web address]  Fax: [phone]   If you need a medication refill please contact your pharmacy. Please allow 3 business days for your refills to be completed.    Instructions for emailing the Diabetes Educators    If you need to communicate a non-urgent message to a Diabetes Educator via email, please send to [email or web address].    Please follow the following email guidelines:    Subject line: Secure: your clinic name (example: Secure: [facility])  In the email please include: First name, middle initial, last name and date of birth.    We will be in touch with you within one (1) business day.          Follow-ups after your visit        Your next 10 appointments already scheduled     Jun 07, 2017  9:00 AM CDT   Diabetic Education with ER DIABETIC ED [facility]   [facility]    [address] [facility] [id number] [phone]            Jul 20, 2017  8:30 AM CDT   Office Visit with Kristen M Kehr, PA-C   [facility]    [address] [facility] [address] [phone]           Bring a current list of meds and any records pertaining to this visit.  For Physicals, please bring immunization records and any forms  needing to be filled out.  Please arrive 10 minutes early to complete paperwork.              Who to contact     If you have questions or need follow up information about today's clinic visit or your schedule please contact [facility] directly at [phone].  Normal or non-critical lab and imaging results will be communicated to you by MyChart, letter or phone within 4 business days after the clinic has received the results. If you do not hear from us within 7 days, please contact the clinic through [employer] or phone. If you have a critical or abnormal lab result, we will notify you by phone as soon as possible.  Submit refill requests through [employer] or call your pharmacy and they will forward the refill request to us. Please allow 3 business days for your refill to be completed.          Additional Information About Your Visit        [employer] Information     [employer] gives you secure access to your electronic health record. If you see a primary care provider, you can also send messages to your care team and make appointments. If you have questions, please call your primary care clinic.  If you do not have a primary care provider, please call [phone] and they will assist you.        Care EveryWhere ID     This is your Care EveryWhere ID. This could be used by other organizations to access your [place] medical records  [id number]        Your Vitals Were     BMI (Body Mass Index)                   36.95 kg/m2            Blood Pressure from Last 3 Encounters:   04/20/17 116/72   03/27/17 120/79   11/30/16 116/81    Weight from Last 3 Encounters:   05/03/17 110.2 kg (243 lb)   04/20/17 111.6 kg (246 lb)   03/27/17 111.6 kg (246 lb)              Today, you had the following     No orders found for display       Primary Care Provider Office Phone # Fax #    Kristen M Kehr, PA-C [phone]       [facility] [address] [facility] [address]        Thank you!     Thank  you for choosing [facility]  for your care. Our goal is always to provide you with excellent care. Hearing back from our patients is one way we can continue to improve our services. Please take a few minutes to complete the written survey that you may receive in the mail after your visit with us. Thank you!             Your Updated Medication List - Protect others around you: Learn how to safely use, store and throw away your medicines at www.disposemymeds.org.          This list is accurate as of: 5/3/17 10:05 AM.  Always use your most recent med list.                   Brand Name Dispense Instructions for use    aspirin 81 MG tablet     30 tablet    Take 1 tablet (81 mg) by mouth daily       atorvastatin 10 MG tablet    LIPITOR    90 tablet    Take 1 tablet (10 mg) by mouth daily       blood glucose monitoring lancets     1 Box    Use to test blood sugar 2 times daily or as directed.       blood glucose monitoring test strip    ACCU-CHEK RENY PLUS    200 strip    Use to test blood sugar 2 times daily or as directed.       diltiazem 360 MG 24 hr capsule    TIAZAC    90 capsule    Take 1 capsule (360 mg) by mouth daily       folic acid 800 MCG Tabs          hydrochlorothiazide 25 MG tablet    HYDRODIURIL    90 tablet    Take 1 tablet (25 mg) by mouth daily       IRON PO          levothyroxine 125 MCG tablet    SYNTHROID/LEVOTHROID    90 tablet    Take 1 tablet (125 mcg) by mouth daily       lisinopril 10 MG tablet    PRINIVIL/ZESTRIL    90 tablet    Take 1 tablet (10 mg) by mouth daily       metFORMIN 500 MG 24 hr tablet    GLUCOPHAGE-XR    180 tablet    Take 1 tablet (500 mg) by mouth 2 times daily (with meals)

## 2017-05-03 NOTE — PROGRESS NOTES
Diabetes Self Management Training: Initial Assessment Visit for Newly Diagnosed Patients (Complete AADE Goals Flowsheet)    Wendy Dodd presents today for education related to Type 2 diabetes.    She is accompanied by self    Patient's diabetes management related comments/concerns: Wants help linking meter to phone's leonardo.  Upset about diagnosis.    Patient's emotional response to diabetes: expresses readiness to learn, overwhelmed and anxiety    Patient would like this visit to be focused around the following diabetes-related behaviors and goals: Healthy Eating and Monitoring    ASSESSMENT:  Patient Problem List and Family Medical History reviewed for relevant medical history, current medical status, and diabetes risk factors.    Current Diabetes Management per Patient:  Taking diabetes medications?   yes:     Diabetes Medication(s)     Biguanides Sig    metFORMIN (GLUCOPHAGE-XR) 500 MG 24 hr tablet Take 1 tablet (500 mg) by mouth 2 times daily (with meals)      , Problems taking diabetes medications regularly? No, did miss one evening dose and took it at bedtime Side effects? Yes - but improved the past 2 days.    Past Diabetes Education: Newly diagnosed    Patient glucose self monitoring as follows: one time daily.   BG meter: Accu-Chek Lizzie Connect meter  BG results: fasting glucose- 122 (today), 129, 102, 117, 104, 123, 130, 135, 154, 149, 125     BG values are: In goal  Patient's most recent   Lab Results   Component Value Date    A1C 8.9 04/13/2017    is not meeting goal of <7.0    Nutrition:  Patient has been on a low carb diet in the past and lost 50 lbs.  Started a low carb diet with her  when she was diagnosed with diabetes.    Breakfast - eggs + 12 grain toast w/ PB  AM - meat sticks, veggie sticks, cheese, mixed nuts  Lunch - Coleslaw OR cheddar cheese + 2 radishes.   PM - meat sticks, nuts  Dinner - Taco salad w/ taco chips OR chicken noodle soup OR BBque ribs + beans + cottage cheese  "  Snacks - 1 Tbsp peanut butter.    Beverages: Coffee  and Water     Cultural/Restorationist diet restrictions: No     Biggest Challenge to Healthy Eating: portion control and knowing what to eat    Physical Activity:    Type: treadmill or stationary bike  Duration: 15 minutes  Frequency: every or every other days/week  Limitations:  Hips and knees    Diabetes Risk Factors:  age over 45 years, hypertriglyceridemia, overweight/obesity and inactivity    Diabetes Complications:  Not discussed today.    Vitals:  There were no vitals taken for this visit.  Estimated body mass index is 37.4 kg/(m^2) as calculated from the following:    Height as of 3/27/17: 1.727 m (5' 8\").    Weight as of 4/20/17: 111.6 kg (246 lb).   Last 3 BP:   BP Readings from Last 3 Encounters:   04/20/17 116/72   03/27/17 120/79   11/30/16 116/81       History   Smoking Status     Current Every Day Smoker     Types: Other   Smokeless Tobacco     Never Used     Comment: Using E-cigs to quit       Labs:  Lab Results   Component Value Date    A1C 8.9 04/13/2017     Lab Results   Component Value Date     03/27/2017     Lab Results   Component Value Date    LDL 91 03/27/2017     HDL Cholesterol   Date Value Ref Range Status   03/27/2017 59 >49 mg/dL Final   ]  GFR Estimate   Date Value Ref Range Status   03/27/2017 >90  Non  GFR Calc   >60 mL/min/1.7m2 Final     GFR Estimate If Black   Date Value Ref Range Status   03/27/2017 >90   GFR Calc   >60 mL/min/1.7m2 Final     Lab Results   Component Value Date    CR 0.63 03/27/2017     No results found for: MICROALBUMIN    Socio/Economic Considerations:    Support system: spouse/significant other    Health Beliefs and Attitudes:   Patient Activation Measure Survey Score:  No flowsheet data found.    Stage of Change: ACTION (Actively working towards change)      Diabetes knowledge and skills assessment:     Patient is knowledgeable in diabetes management concepts related to: " Monitoring    Patient needs further education on the following diabetes management concepts: Healthy Eating, Being Active, Taking Medication, Problem Solving, Reducing Risks and Healthy Coping    Barriers to Learning Assessment: Admits to having a poor memory    Based on learning assessment above, most appropriate setting for further diabetes education would be: Individual setting.    INTERVENTION:   Education provided today on:  AADE Self-Care Behaviors:  Healthy Eating: carbohydrate counting, carbohydrate goals for weight reduction, portion control and plate planning method  Being Active: relationship to blood glucose  Monitoring: log and interpret results, individual blood glucose targets, frequency of monitoring and how to connect accu-chek BG meter to the leonardo and share with provider  Taking Medication: action of prescribed medication, side effects of prescribed medications and when to take medications    Opportunities for ongoing education and support in diabetes-self management were discussed.    Pt verbalized understanding of concepts discussed and recommendations provided today.       Education Materials Provided:  [place] Understanding Diabetes Booklet, BG Log Sheet and My Plate Planner    PLAN:  See Patient Instructions for co-developed, patient-stated behavior change goals.  AVS printed and provided to patient today.    FOLLOW-UP:  Follow-up appointment scheduled on 6/17/17.  Education topics to cover at the next diabetes education visit(s): Reducing risks    Ongoing plan for education and support: Follow-up visit with diabetes educator in 1 month    Rand Burrows RD, LD, CDE    Time Spent: 60 minutes  Encounter Type: Individual

## 2017-05-03 NOTE — PATIENT INSTRUCTIONS
1.  Aim for 1 serving of fruit daily.  2.  Aim for 15 minutes of activity daily.  3.  Aim for 1 vegetable serving a day.    FOLLOW UP:  June 7 th @ 9:00 am    [facility] for the [facility] Area:  For Your Diabetes Education and Nutrition Appointments Call:  [phone]   For Diabetes Education or Nutrition Related Questions:   Phone: [phone]  E-mail: [email or web address]  Fax: [phone]   If you need a medication refill please contact your pharmacy. Please allow 3 business days for your refills to be completed.    Instructions for emailing the Diabetes Educators    If you need to communicate a non-urgent message to a Diabetes Educator via email, please send to [email or web address].    Please follow the following email guidelines:    Subject line: Secure: your clinic name (example: Secure: [facility])  In the email please include: First name, middle initial, last name and date of birth.    We will be in touch with you within one (1) business day.

## 2017-06-23 ENCOUNTER — TELEPHONE (OUTPATIENT)
Dept: FAMILY MEDICINE | Facility: CLINIC | Age: 57
End: 2017-06-23

## 2017-06-23 NOTE — TELEPHONE ENCOUNTER
Panel Management Review      Patient has the following on her problem list:     Diabetes    ASA: Passed    Last A1C  Lab Results   Component Value Date    A1C 8.9 04/13/2017     A1C tested: MONITOR    Last LDL:    Lab Results   Component Value Date    CHOL 181 03/27/2017     Lab Results   Component Value Date    HDL 59 03/27/2017     Lab Results   Component Value Date    LDL 91 03/27/2017     Lab Results   Component Value Date    TRIG 155 03/27/2017     No results found for: CHOLHDLRATIO  Lab Results   Component Value Date    NHDL 122 03/27/2017       Is the patient on a Statin? YES             Is the patient on Aspirin? YES    Medications     HMG CoA Reductase Inhibitors    atorvastatin (LIPITOR) 10 MG tablet    Salicylates    aspirin 81 MG tablet          Last three blood pressure readings:  BP Readings from Last 3 Encounters:   04/20/17 116/72   03/27/17 120/79   11/30/16 116/81       Date of last diabetes office visit: 04/20/17     Tobacco History:     History   Smoking Status     Current Every Day Smoker     Types: Other   Smokeless Tobacco     Never Used     Comment: Using E-cigs to quit         Hypertension   Last three blood pressure readings:  BP Readings from Last 3 Encounters:   04/20/17 116/72   03/27/17 120/79   11/30/16 116/81     Blood pressure: Passed    HTN Guidelines:  Age 18-59 BP range:  Less than 140/90  Age 60-85 with Diabetes:  Less than 140/90  Age 60-85 without Diabetes:  less than 150/90      Composite cancer screening  Chart review shows that this patient is due/due soon for the following None  Summary:    Patient is due/failing the following:   Microalbumin, foot, and eye exam     Action needed:   Patient is scheduled to see Kristen Kehr PA-C on 07/20/17 for her diabetes follow up.    Type of outreach:    none    Questions for provider review:    None                                                                                                                                    Demetrius  KATEY SOLIS       Chart routed to close .

## 2017-06-30 ENCOUNTER — OFFICE VISIT (OUTPATIENT)
Dept: FAMILY MEDICINE | Facility: OTHER | Age: 57
End: 2017-06-30
Payer: COMMERCIAL

## 2017-06-30 VITALS
HEIGHT: 68 IN | WEIGHT: 235 LBS | BODY MASS INDEX: 35.61 KG/M2 | SYSTOLIC BLOOD PRESSURE: 116 MMHG | DIASTOLIC BLOOD PRESSURE: 70 MMHG | HEART RATE: 64 BPM | RESPIRATION RATE: 16 BRPM | TEMPERATURE: 98.3 F

## 2017-06-30 DIAGNOSIS — M77.8 RIGHT SHOULDER TENDONITIS: Primary | ICD-10-CM

## 2017-06-30 DIAGNOSIS — S76.011A PSOAS MUSCLE STRAIN, RIGHT, INITIAL ENCOUNTER: ICD-10-CM

## 2017-06-30 PROCEDURE — 99214 OFFICE O/P EST MOD 30 MIN: CPT | Performed by: NURSE PRACTITIONER

## 2017-06-30 RX ORDER — METHYLPREDNISOLONE 4 MG
TABLET, DOSE PACK ORAL
Qty: 21 TABLET | Refills: 0 | Status: SHIPPED | OUTPATIENT
Start: 2017-06-30 | End: 2017-08-07

## 2017-06-30 ASSESSMENT — PAIN SCALES - GENERAL: PAINLEVEL: EXTREME PAIN (9)

## 2017-06-30 NOTE — MR AVS SNAPSHOT
After Visit Summary   6/30/2017    Wendy Dodd    MRN: [id number]           Patient Information     Date Of Birth          1960        Visit Information        Provider Department      6/30/2017 12:00 PM Daksha Powers APRN CNP [facility]        Today's Diagnoses     Psoas muscle strain, right, initial encounter    -  1    Right shoulder tendonitis          Care Instructions    Please ice 3 times daily for 10 -15 minutes. If pain decreases enough for gentle stretches. If pain continues after taking medication would recommend seeing orthopedic specialty.   May also use topical like Bengay, Biofreeze or capsaicin     I recommend using headset at work for phone.     Hip stretches for psoas muscle do these 1-2 times daily.     Return to clinic if symptoms worsen or call for ortho referral as stated above.     Thank you  Daksha Powers CNP            Follow-ups after your visit        Additional Services     ORTHO  REFERRAL       [facility] is referring you to the Orthopedic  Services at [facility].       The  Representative will assist you in the coordination of your Orthopedic and Musculoskeletal Care as prescribed by your physician.    The  Representative will call you within 1 business day to help schedule your appointment, or you may contact the  Representative at:    All areas ~ [phone]     Type of Referral : Surgical / Specialist       Timeframe requested: 3 - 5 days    Coverage of these services is subject to the terms and limitations of your health insurance plan.  Please call member services at your health plan with any benefit or coverage questions.      If X-rays, CT or MRI's have been performed, please contact the facility where they were done to arrange for , prior to your scheduled appointment.  Please bring this referral request to your appointment and  "present it to your specialist.                  Your next 10 appointments already scheduled     Jul 20, 2017  8:30 AM CDT   Office Visit with Kristen M Kehr, PA-C   [facility] ([facility]    [address] [facility] [address] [phone]           Bring a current list of meds and any records pertaining to this visit.  For Physicals, please bring immunization records and any forms needing to be filled out.  Please arrive 10 minutes early to complete paperwork.              Who to contact     If you have questions or need follow up information about today's clinic visit or your schedule please contact [facility] directly at [phone].  Normal or non-critical lab and imaging results will be communicated to you by MyChart, letter or phone within 4 business days after the clinic has received the results. If you do not hear from us within 7 days, please contact the clinic through [employer] or phone. If you have a critical or abnormal lab result, we will notify you by phone as soon as possible.  Submit refill requests through [employer] or call your pharmacy and they will forward the refill request to us. Please allow 3 business days for your refill to be completed.          Additional Information About Your Visit        My[employer] Engine Information     [employer] gives you secure access to your electronic health record. If you see a primary care provider, you can also send messages to your care team and make appointments. If you have questions, please call your primary care clinic.  If you do not have a primary care provider, please call [phone] and they will assist you.        Care EveryWhere ID     This is your Care EveryWhere ID. This could be used by other organizations to access your [facility] medical records  [id number]        Your Vitals Were     Pulse Temperature Respirations Height BMI (Body Mass Index)       64 98.3  F (36.8  C) (Oral) 16 5' 8\" (1.727 m) 35.73 " kg/m2        Blood Pressure from Last 3 Encounters:   06/30/17 116/70   04/20/17 116/72   03/27/17 120/79    Weight from Last 3 Encounters:   06/30/17 235 lb (106.6 kg)   05/03/17 243 lb (110.2 kg)   04/20/17 246 lb (111.6 kg)              We Performed the Following     ORTHO  REFERRAL          Today's Medication Changes          These changes are accurate as of: 6/30/17 12:53 PM.  If you have any questions, ask your nurse or doctor.               Start taking these medicines.        Dose/Directions    methylPREDNISolone 4 MG tablet   Commonly known as:  MEDROL DOSEPAK   Used for:  Right shoulder tendonitis   Started by:  Daksha Powers APRN CNP        Follow package instructions   Quantity:  21 tablet   Refills:  0            Where to get your medicines      These medications were sent to [id number] - [place] - [address]  [address] [address]     Phone:  [phone]     methylPREDNISolone 4 MG tablet                Primary Care Provider Office Phone # Fax #    Kristen M Kehr, PA-C [phone]       [facility] [address] [facility] [address] [facility] [address]        Equal Access to Services     SAVANNAH WOOD AH: Hadii nanda ku hadasho Soomaali, waaxda luqadaha, qaybta kaalmada adeegyada, toby varela. So [facility] [phone].    ATENCIÓN: Si habla español, tiene a freire disposición servicios gratuitos de asistencia lingüística. Llame al [phone].    We comply with applicable federal civil rights laws and [place] laws. We do not discriminate on the basis of race, color, national origin, age, disability sex, sexual orientation or gender identity.            Thank you!     Thank you for choosing [facility]  for your care. Our goal is always to provide you with excellent care. Hearing back from our patients is one way we can continue to improve our services. Please take a few minutes to complete the written  survey that you may receive in the mail after your visit with us. Thank you!             Your Updated Medication List - Protect others around you: Learn how to safely use, store and throw away your medicines at www.disposemymeds.org.          This list is accurate as of: 6/30/17 12:53 PM.  Always use your most recent med list.                   Brand Name Dispense Instructions for use Diagnosis    aspirin 81 MG tablet     30 tablet    Take 1 tablet (81 mg) by mouth daily    Type 2 diabetes mellitus without complication, without long-term current use of insulin (H)       atorvastatin 10 MG tablet    LIPITOR    90 tablet    Take 1 tablet (10 mg) by mouth daily    Type 2 diabetes mellitus without complication, without long-term current use of insulin (H)       blood glucose monitoring lancets     1 Box    Use to test blood sugar 2 times daily or as directed.        blood glucose monitoring test strip    ACCU-CHEK RENY PLUS    200 strip    Use to test blood sugar 2 times daily or as directed.        diltiazem 360 MG 24 hr capsule    TIAZAC    90 capsule    Take 1 capsule (360 mg) by mouth daily    Benign essential hypertension       folic acid 800 MCG Tabs           hydrochlorothiazide 25 MG tablet    HYDRODIURIL    90 tablet    Take 1 tablet (25 mg) by mouth daily    Benign essential hypertension       IRON PO           levothyroxine 125 MCG tablet    SYNTHROID/LEVOTHROID    90 tablet    Take 1 tablet (125 mcg) by mouth daily    Hypothyroidism, unspecified type       lisinopril 10 MG tablet    PRINIVIL/ZESTRIL    90 tablet    Take 1 tablet (10 mg) by mouth daily    Benign essential hypertension       metFORMIN 500 MG 24 hr tablet    GLUCOPHAGE-XR    180 tablet    Take 1 tablet (500 mg) by mouth 2 times daily (with meals)    Type 2 diabetes mellitus without complication, without long-term current use of insulin (H)       methylPREDNISolone 4 MG tablet    MEDROL DOSEPAK    21 tablet    Follow package instructions     Right shoulder tendonitis

## 2017-06-30 NOTE — PATIENT INSTRUCTIONS
Please ice 3 times daily for 10 -15 minutes. If pain decreases enough for gentle stretches. If pain continues after taking medication would recommend seeing orthopedic specialty.   May also use topical like Bengay, Biofreeze or capsaicin     I recommend using headset at work for phone.     Hip stretches for psoas muscle do these 1-2 times daily.     Return to clinic if symptoms worsen or call for ortho referral as stated above.     Thank you  Daksha Powers CNP

## 2017-06-30 NOTE — NURSING NOTE
"Chief Complaint   Patient presents with     Shoulder right       Initial /70 (BP Location: Right arm, Patient Position: Chair, Cuff Size: Adult Large)  Pulse 64  Temp 98.3  F (36.8  C) (Oral)  Resp 16  Ht 5' 8\" (1.727 m)  Wt 235 lb (106.6 kg)  BMI 35.73 kg/m2 Estimated body mass index is 35.73 kg/(m^2) as calculated from the following:    Height as of this encounter: 5' 8\" (1.727 m).    Weight as of this encounter: 235 lb (106.6 kg).  Medication Reconciliation: complete    "

## 2017-06-30 NOTE — PROGRESS NOTES
"  SUBJECTIVE:                                                    Wendy Dodd is a 56 year old female who presents to clinic today for the following health issues:      HPI    Joint Pain    Onset: Tuesday     Description:   Location: right shoulder and upper arm   Character: Gwaning pain, constant    Intensity: moderate, severe    Progression of Symptoms: worse    Accompanying Signs & Symptoms:  Other symptoms: radiating into neck a little   Cannot lift arm    History:   Previous similar pain: no       Precipitating factors:   Trauma or overuse: no     Alleviating factors:  Improved by: nothing    Therapies Tried and outcome: aleve, didn't help.     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   Is not active outside of work. Does some light gardening.     Patient also complaining of right hip pain she was seeing pt for this and discontinued going also discontinued exercises given to her. She states the pain has moved from hip area medially to groin.     Problem list and histories reviewed & adjusted, as indicated.  Additional history: as documented    BP Readings from Last 3 Encounters:   06/30/17 116/70   04/20/17 116/72   03/27/17 120/79    Wt Readings from Last 3 Encounters:   06/30/17 235 lb (106.6 kg)   05/03/17 243 lb (110.2 kg)   04/20/17 246 lb (111.6 kg)         Labs reviewed in EPIC    ROS:  Constitutional, HEENT, cardiovascular, pulmonary, gi and gu systems are negative, except as otherwise noted.    OBJECTIVE:     /70 (BP Location: Right arm, Patient Position: Chair, Cuff Size: Adult Large)  Pulse 64  Temp 98.3  F (36.8  C) (Oral)  Resp 16  Ht 5' 8\" (1.727 m)  Wt 235 lb (106.6 kg)  BMI 35.73 kg/m2  Body mass index is 35.73 kg/(m^2).  GENERAL: healthy, alert and no distress  NECK: no adenopathy, no asymmetry, masses, or scars and thyroid normal to palpation  RESP: lungs clear to auscultation - no rales, rhonchi or wheezes  CV: " regular rate and rhythm, normal S1 S2, no S3 or S4, no murmur, click or rub, no peripheral edema and peripheral pulses strong  ABDOMEN: soft, nontender, no hepatosplenomegaly, no masses and bowel sounds normal  MS:   SHOULDER Exam-Right   Inspection: no swelling, no bruising, no discoloration, no obvious deformity, no asymmetry, no glenohumeral joint anterior bulge, no distal clavicle elevation, no muscle atrophy, no scapular winging   Tenderness of: SC joint- YES, clavicle(prox-mid)- no , clavicle-(mid-distal)- no , AC joint- YES, acromion- YES, anterior capsule- YES, prox bicep tendon- no , greater tuberosity- no , prox humerus- no , supraspinatous- YES, infraspinatous- no , superior trapezious- no , rhomboids- no    Range of Motion: Active- limited due to pain.  Range of Motion: Passive- limited due to pain.   Strength: unable to assess due to pain           Hip Exam: Palpation: Tender:   Psoas   Non-tender:  right greater trochanter, right gluteus medius  Range of Motion:  Right Hip  full  Strength:  full strength  Special tests:  no crepitation/snapping over central inguinal region, no crepitation/snapping over greater trochanter, no IT band tightness, iliopsoas tightness  Right        SKIN: no suspicious lesions or rashes  NEURO: Normal strength and tone, mentation intact and speech normal  BACK: no CVA tenderness, no paralumbar tenderness  PSYCH: mentation appears normal, affect normal/bright    Diagnostic Test Results:  none     ASSESSMENT/PLAN:     1. Right shoulder tendonitis  Has not had relief with Nsaids. Will have her start icing diligently, may alternate with heat. May try topical. Declines muscle relaxant. Will monitor blood sugars closely.  - methylPREDNISolone (MEDROL DOSEPAK) 4 MG tablet; Follow package instructions  Dispense: 21 tablet; Refill: 0  - ORTHO  REFERRAL   For further assessment and treatment plan. Possible SI if deemed necessary.   2. Psoas muscle strain, right, initial  encounter  Will stretch. Return to pt exercises.     Home care instructions were reviewed with the patient. The risks, benefits and treatment options of prescribed medications or other treatments have been discussed with the patient. The patient verbalized their understanding and should call or follow up if no improvement or if they develop further problems.  Return to clinic prn      Patient Instructions   Please ice 3 times daily for 10 -15 minutes. If pain decreases enough for gentle stretches. If pain continues after taking medication would recommend seeing orthopedic specialty.   May also use topical like Bengay, Biofreeze or capsaicin     I recommend using headset at work for phone.     Hip stretches for psoas muscle do these 1-2 times daily.     Return to clinic if symptoms worsen or call for ortho referral as stated above.     Thank you  ALINA Cabral CNP [facility]

## 2017-07-18 ENCOUNTER — MYC MEDICAL ADVICE (OUTPATIENT)
Dept: FAMILY MEDICINE | Facility: CLINIC | Age: 57
End: 2017-07-18

## 2017-08-07 ENCOUNTER — OFFICE VISIT (OUTPATIENT)
Dept: FAMILY MEDICINE | Facility: CLINIC | Age: 57
End: 2017-08-07
Payer: COMMERCIAL

## 2017-08-07 VITALS
HEART RATE: 73 BPM | TEMPERATURE: 98.4 F | WEIGHT: 234 LBS | OXYGEN SATURATION: 94 % | SYSTOLIC BLOOD PRESSURE: 114 MMHG | BODY MASS INDEX: 35.58 KG/M2 | DIASTOLIC BLOOD PRESSURE: 73 MMHG

## 2017-08-07 DIAGNOSIS — E11.9 TYPE 2 DIABETES MELLITUS WITHOUT COMPLICATION, WITHOUT LONG-TERM CURRENT USE OF INSULIN (H): Primary | ICD-10-CM

## 2017-08-07 LAB
CREAT UR-MCNC: 81 MG/DL
HBA1C MFR BLD: 7.5 % (ref 4.3–6)
MICROALBUMIN UR-MCNC: 6 MG/L
MICROALBUMIN/CREAT UR: 7.25 MG/G CR (ref 0–25)

## 2017-08-07 PROCEDURE — 36415 COLL VENOUS BLD VENIPUNCTURE: CPT | Performed by: PHYSICIAN ASSISTANT

## 2017-08-07 PROCEDURE — 83036 HEMOGLOBIN GLYCOSYLATED A1C: CPT | Performed by: PHYSICIAN ASSISTANT

## 2017-08-07 PROCEDURE — 99213 OFFICE O/P EST LOW 20 MIN: CPT | Mod: 25 | Performed by: PHYSICIAN ASSISTANT

## 2017-08-07 PROCEDURE — 82043 UR ALBUMIN QUANTITATIVE: CPT | Performed by: PHYSICIAN ASSISTANT

## 2017-08-07 PROCEDURE — 99207 C FOOT EXAM  NO CHARGE: CPT | Performed by: PHYSICIAN ASSISTANT

## 2017-08-07 NOTE — MR AVS SNAPSHOT
After Visit Summary   8/7/2017    Wendy Dodd    MRN: [id number]           Patient Information     Date Of Birth          1960        Visit Information        Provider Department      8/7/2017 3:50 PM Kehr, Kristen M, PA-C [facility]        Today's Diagnoses     Type 2 diabetes mellitus without complication, without long-term current use of insulin (H)    -  1       Follow-ups after your visit        Additional Services     DIABETES EDUCATOR REFERRAL       DIABETES SELF MANAGEMENT TRAINING (DSMT)      Your provider has referred you to Diabetes Education: FMG: Diabetes Education - All [facility] [phone]   [email or web address]    PLEASE SCHEDULE WITH JENNIFER MARINO IN [place] [facility]    Type of training and number of hours:   Medicare covers: 10 hours of initial DSMT in 12 month period from the time of first visit, plus 2 hours of follow-up DSMT annually, and additional hours as requested for insulin training.    Diabetes Type: Type 2 - On Oral Medication             Diabetes Co-Morbidities: none               A1C Goal:  <8.0       A1C is: Lab Results       Component                Value               Date                       A1C                      7.5                 08/07/2017              If an urgent visit is needed or A1C is above 12, Care Team to call the Diabetes Education Team at [phone] or send an In Basket message to the Diabetes Education Pool (P DIAB ED-PATIENT CARE).    Diabetes Education Topics: Comprehensive Knowledge Assessment and Instruction    Special Educational Needs Requiring Individual DSMT: None       MEDICAL NUTRITION THERAPY (MNT) for Diabetes    Medical Nutrition Therapy with a Registered Dietitian can be provided in coordination with Diabetes Self-Management Training to assist in achieving optimal diabetes management.    MNT Type and Hours: Do not initiate MNT at this time.                        Medicare will cover: 3 hours initial MNT in 12 month period after first visit, plus 2 hours of follow-up MNT annually    Please be aware that coverage of these services is subject to the terms and limitations of your health insurance plan.  Call member services at your health plan to determine Diabetes Self-Management Training benefits and ask which blood glucose monitor brands are covered by your plan.      Please bring the following with you to your appointment:    (1)  List of current medications   (2)  List of Blood Glucose Monitor brands that are covered by your insurance plan  (3)  Blood Glucose Monitor and log book  (4)   Food records for the 3 days prior to your visit    The Certified Diabetes Educator may make diabetes medication adjustments per the CDE Protocol and Collaborative Practice Agreement.                  Who to contact     If you have questions or need follow up information about today's clinic visit or your schedule please contact [facility] directly at [phone].  Normal or non-critical lab and imaging results will be communicated to you by MyChart, letter or phone within 4 business days after the clinic has received the results. If you do not hear from us within 7 days, please contact the clinic through SpiderSuitehart or phone. If you have a critical or abnormal lab result, we will notify you by phone as soon as possible.  Submit refill requests through My True Fit or call your pharmacy and they will forward the refill request to us. Please allow 3 business days for your refill to be completed.          Additional Information About Your Visit        SpiderSuitehart Information     My True Fit gives you secure access to your electronic health record. If you see a primary care provider, you can also send messages to your care team and make appointments. If you have questions, please call your primary care clinic.  If you do not have a primary care provider, please call [phone] and  they will assist you.        Care EveryWhere ID     This is your Care EveryWhere ID. This could be used by other organizations to access your [facility] medical records  [id number]        Your Vitals Were     Pulse Temperature Pulse Oximetry BMI (Body Mass Index)          73 98.4  F (36.9  C) (Oral) 94% 35.58 kg/m2         Blood Pressure from Last 3 Encounters:   08/07/17 114/73   06/30/17 116/70   04/20/17 116/72    Weight from Last 3 Encounters:   08/07/17 234 lb (106.1 kg)   06/30/17 235 lb (106.6 kg)   05/03/17 243 lb (110.2 kg)              We Performed the Following     Albumin Random Urine Quantitative     DIABETES EDUCATOR REFERRAL     FOOT EXAM     Hemoglobin A1c        Primary Care Provider Office Phone # Fax #    Kristen M Kehr, PA-C [phone]       [facility] [address] [facility] [address]        Equal Access to Services     SAVANNAH WOOD : Hadii aad ku hadasho Soomaali, waaxda luqadaha, qaybta kaalmada adeegyada, waxay idiin hayaan lucy gomez . So [facility] [phone].    ATENCIÓN: Si acla esplanie, tiene a freire disposición servicios gratuitos de asistencia lingüística. Llame al [phone].    We comply with applicable federal civil rights laws and [place] laws. We do not discriminate on the basis of race, color, national origin, age, disability sex, sexual orientation or gender identity.            Thank you!     Thank you for choosing [facility]  for your care. Our goal is always to provide you with excellent care. Hearing back from our patients is one way we can continue to improve our services. Please take a few minutes to complete the written survey that you may receive in the mail after your visit with us. Thank you!             Your Updated Medication List - Protect others around you: Learn how to safely use, store and throw away your medicines at www.disposemymeds.org.          This list is accurate as of: 8/7/17  6:00 PM.  Always use  your most recent med list.                   Brand Name Dispense Instructions for use Diagnosis    aspirin 81 MG tablet     30 tablet    Take 1 tablet (81 mg) by mouth daily    Type 2 diabetes mellitus without complication, without long-term current use of insulin (H)       atorvastatin 10 MG tablet    LIPITOR    90 tablet    Take 1 tablet (10 mg) by mouth daily    Type 2 diabetes mellitus without complication, without long-term current use of insulin (H)       blood glucose monitoring lancets     1 Box    Use to test blood sugar 2 times daily or as directed.        blood glucose monitoring test strip    ACCU-CHEK RENY PLUS    200 strip    Use to test blood sugar 2 times daily or as directed.        diltiazem 360 MG 24 hr capsule    TIAZAC    90 capsule    Take 1 capsule (360 mg) by mouth daily    Benign essential hypertension       folic acid 800 MCG Tabs           hydrochlorothiazide 25 MG tablet    HYDRODIURIL    90 tablet    Take 1 tablet (25 mg) by mouth daily    Benign essential hypertension       IRON PO           levothyroxine 125 MCG tablet    SYNTHROID/LEVOTHROID    90 tablet    Take 1 tablet (125 mcg) by mouth daily    Hypothyroidism, unspecified type       lisinopril 10 MG tablet    PRINIVIL/ZESTRIL    90 tablet    Take 1 tablet (10 mg) by mouth daily    Benign essential hypertension       metFORMIN 500 MG 24 hr tablet    GLUCOPHAGE-XR    180 tablet    Take 1 tablet (500 mg) by mouth 2 times daily (with meals)    Type 2 diabetes mellitus without complication, without long-term current use of insulin (H)

## 2017-08-07 NOTE — PROGRESS NOTES
SUBJECTIVE:                                                    Wendy Dodd is a 56 year old female who presents to clinic today for the following health issues:    She met with Diabetes Educator and admits that she did not have a good attitude at that time. She has not implemented any changes with her diet and exercise.  She is taking her medication. She has been on metformin twice daily at 500 mg. She denies side effects. Her blood sugars have been in the 130s.     History of Present Illness   Diabetes:     Frequency of checking blood sugars::  1 time a day    Diabetic concerns::  None    Hypoglycemia symptoms::  None    Paraesthesia present::  No    Eye Exam in the last year::  NO        Problem list and histories reviewed & adjusted, as indicated.  Additional history: as documented      Patient Active Problem List   Diagnosis     Advanced directives, counseling/discussion     Diverticulosis of large intestine     External hemorrhoids     History of colonic polyps     Benign essential hypertension     Hypothyroidism, unspecified type     Non morbid obesity due to excess calories     Type 2 diabetes mellitus without complication, without long-term current use of insulin (H)     Past Surgical History:   Procedure Laterality Date     ABDOMEN SURGERY       APPENDECTOMY       BIOPSY       COLONOSCOPY       ENT SURGERY         Social History   Substance Use Topics     Smoking status: Former Smoker     Types: Cigarettes, Other     Start date: 1/1/1975     Quit date: 1/1/2015     Smokeless tobacco: Never Used      Comment: I use Ecig     Alcohol use 0.0 oz/week     Family History   Problem Relation Age of Onset     Colon Cancer Father      Other Cancer Father      liver     Other Cancer Brother      Skin Cancer Sister          Current Outpatient Prescriptions   Medication Sig Dispense Refill     metFORMIN (GLUCOPHAGE-XR) 500 MG 24 hr tablet Take 1 tablet (500 mg) by mouth 2 times daily (with meals) 180 tablet 1      levothyroxine (SYNTHROID/LEVOTHROID) 125 MCG tablet Take 1 tablet (125 mcg) by mouth daily 90 tablet 3     diltiazem (TIAZAC) 360 MG 24 hr capsule Take 1 capsule (360 mg) by mouth daily 90 capsule 1     lisinopril (PRINIVIL/ZESTRIL) 10 MG tablet Take 1 tablet (10 mg) by mouth daily 90 tablet 1     hydrochlorothiazide (HYDRODIURIL) 25 MG tablet Take 1 tablet (25 mg) by mouth daily 90 tablet 1     blood glucose monitoring (ACCU-CHEK RENY PLUS) test strip Use to test blood sugar 2 times daily or as directed. 200 strip 11     blood glucose monitoring (ACCU-CHEK FASTCLIX) lancets Use to test blood sugar 2 times daily or as directed. 1 Box 11     atorvastatin (LIPITOR) 10 MG tablet Take 1 tablet (10 mg) by mouth daily 90 tablet 1     aspirin 81 MG tablet Take 1 tablet (81 mg) by mouth daily 30 tablet 11     folic acid 800 MCG TABS        IRON PO        Allergies   Allergen Reactions     Penicillins      Sulfa Drugs      Recent Labs   Lab Test  08/07/17   1637  04/13/17   1654  03/27/17   1027  04/21/16   1228   A1C  7.5*  8.9*   --    --    LDL   --    --   91   --    HDL   --    --   59   --    TRIG   --    --   155*   --    CR   --    --   0.63  0.64   GFRESTIMATED   --    --   >90  Non  GFR Calc    >90  Non  GFR Calc     GFRESTBLACK   --    --   >90   GFR Calc    >90   GFR Calc     POTASSIUM   --    --   4.5  4.0   TSH   --    --   1.80  0.61            ROS:  Constitutional, HEENT, cardiovascular, pulmonary, gi and gu systems are negative, except as otherwise noted.      OBJECTIVE:   /73  Pulse 73  Temp 98.4  F (36.9  C) (Oral)  Wt 234 lb (106.1 kg)  SpO2 94%  BMI 35.58 kg/m2  Body mass index is 35.58 kg/(m^2).  GENERAL: healthy, alert and no distress  RESP: lungs clear to auscultation - no rales, rhonchi or wheezes  CV: regular rate and rhythm, normal S1 S2, no S3 or S4, no murmur, click or rub, no peripheral edema and peripheral pulses  strong  MS: no gross musculoskeletal defects noted, no edema  SKIN: no suspicious lesions or rashes  PSYCH: mentation appears normal, affect normal/bright  Diabetic foot exam: normal DP and PT pulses, no trophic changes or ulcerative lesions and normal sensory exam    Diagnostic Test Results:  none     ASSESSMENT/PLAN:     (E11.9) Type 2 diabetes mellitus without complication, without long-term current use of insulin (H)  (primary encounter diagnosis)  Check A1C today.   Most likely increase the dose of the metformin to 1000 mg twice daily.   Reschedule with Diabetes Education. She has a better outlook with diabetes and I think ready to start making changes.   She would like to make her next appointment with Diabetes Education in [place].   Plan: Hemoglobin A1c, Albumin Random Urine         Quantitative, DIABETES EDUCATOR REFERRAL              Follow up in 6 months.     Kristen M. Kehr, PA-C  [facility]

## 2017-08-07 NOTE — NURSING NOTE
"Chief Complaint   Patient presents with     Diabetes     recheck       Initial /73  Pulse 73  Temp 98.4  F (36.9  C) (Oral)  Wt 234 lb (106.1 kg)  SpO2 94%  BMI 35.58 kg/m2 Estimated body mass index is 35.58 kg/(m^2) as calculated from the following:    Height as of 6/30/17: 5' 8\" (1.727 m).    Weight as of this encounter: 234 lb (106.1 kg).  Medication Reconciliation: complete    STEPHANIE Raymundo MA    "

## 2017-08-08 ENCOUNTER — TELEPHONE (OUTPATIENT)
Dept: FAMILY MEDICINE | Facility: CLINIC | Age: 57
End: 2017-08-08

## 2017-08-08 RX ORDER — METFORMIN HCL 500 MG
1000 TABLET, EXTENDED RELEASE 24 HR ORAL 2 TIMES DAILY WITH MEALS
Qty: 360 TABLET | Refills: 1 | Status: SHIPPED | OUTPATIENT
Start: 2017-08-08 | End: 2018-03-02

## 2017-08-08 NOTE — TELEPHONE ENCOUNTER
Please contact Wendy and let her know that her A1C is actually better. Because it is going to take time to get in with Diabetes Education and work on lifestyle changes, I am going to have her increase the metformin to 1000 mg twice daily.   She is going to take 2 pills twice daily.   Return to see me again in 6 months with fasting lab tests.   Thank you. Kristen Kehr PA-C              Results for orders placed or performed in visit on 08/07/17   Hemoglobin A1c   Result Value Ref Range    Hemoglobin A1C 7.5 (H) 4.3 - 6.0 %   Albumin Random Urine Quantitative   Result Value Ref Range    Creatinine Urine 81 mg/dL    Albumin Urine mg/L 6 mg/L    Albumin Urine mg/g Cr 7.25 0 - 25 mg/g Cr

## 2017-08-08 NOTE — TELEPHONE ENCOUNTER
Patient/parent is informed of MD note below, as it is written. Verbalized good understanding.    [id number] - [place], [place] - [address]  [address] [address]  Phone: [phone] Fax: [phone]    Paz May RN

## 2017-08-15 ENCOUNTER — OFFICE VISIT (OUTPATIENT)
Dept: FAMILY MEDICINE | Facility: CLINIC | Age: 57
End: 2017-08-15
Payer: COMMERCIAL

## 2017-08-15 VITALS
TEMPERATURE: 99 F | SYSTOLIC BLOOD PRESSURE: 121 MMHG | DIASTOLIC BLOOD PRESSURE: 76 MMHG | OXYGEN SATURATION: 97 % | HEART RATE: 75 BPM

## 2017-08-15 DIAGNOSIS — S23.9XXA SPRAIN OF THORACIC REGION: ICD-10-CM

## 2017-08-15 DIAGNOSIS — M54.6 ACUTE RIGHT-SIDED THORACIC BACK PAIN: Primary | ICD-10-CM

## 2017-08-15 PROCEDURE — 99214 OFFICE O/P EST MOD 30 MIN: CPT | Performed by: PHYSICIAN ASSISTANT

## 2017-08-15 RX ORDER — NAPROXEN 500 MG/1
500 TABLET ORAL 2 TIMES DAILY WITH MEALS
Qty: 30 TABLET | Refills: 0 | Status: SHIPPED | OUTPATIENT
Start: 2017-08-15 | End: 2018-03-12

## 2017-08-15 RX ORDER — CYCLOBENZAPRINE HCL 10 MG
10 TABLET ORAL
Qty: 14 TABLET | Refills: 1 | Status: SHIPPED | OUTPATIENT
Start: 2017-08-15 | End: 2018-03-12

## 2017-08-15 NOTE — PROGRESS NOTES
SUBJECTIVE:                                                    Wendy Dodd is a 56 year old female who presents to clinic today for the following health issues:      Back Pain       Duration: 3 weeks no known injury ,right sided area waist         Specific cause: none    Description:   Location of pain: middle of back right  Character of pain: sharp and intermittent  Pain radiation:some times to left side but not full to the left  New numbness or weakness in legs, not attributed to pain:  no     Intensity: Currently 1/10, At its worst 10/10    History:   Pain interferes with job: No,   History of back problems: no prior back problems  Any previous MRI or X-rays: None  Sees a specialist for back pain:  No  Therapies tried without relief: rest, sitting and standing    Alleviating factors:   Improved by: rest and sitting      Precipitating factors:  Worsened by: Coughing and moving arm when walking    Functional and Psychosocial Screen (Rylan STarT Back):      Was performed today  Has been dealing with r hip pain. Physical therapy says groin muscle strain, getting better. Has been walking differently because of it. Normal creatinine 3/2017. No fever or night sweats. No UE/LE radicular pain,n,t, weakness. No radiation of pain to abdomen. No abdominal pain.    Allergies   Allergen Reactions     Penicillins      Sulfa Drugs        Past Medical History:   Diagnosis Date     Cancer (H)      Cerebral infarction (H)      Diabetes (H)      Hypertension      Thyroid disease      Type 2 diabetes mellitus without complication, without long-term current use of insulin (H)          Current Outpatient Prescriptions on File Prior to Visit:  metFORMIN (GLUCOPHAGE-XR) 500 MG 24 hr tablet Take 2 tablets (1,000 mg) by mouth 2 times daily (with meals)   levothyroxine (SYNTHROID/LEVOTHROID) 125 MCG tablet Take 1 tablet (125 mcg) by mouth daily   diltiazem (TIAZAC) 360 MG 24 hr capsule Take 1 capsule (360 mg) by mouth daily    lisinopril (PRINIVIL/ZESTRIL) 10 MG tablet Take 1 tablet (10 mg) by mouth daily   hydrochlorothiazide (HYDRODIURIL) 25 MG tablet Take 1 tablet (25 mg) by mouth daily   blood glucose monitoring (ACCU-CHEK RENY PLUS) test strip Use to test blood sugar 2 times daily or as directed.   blood glucose monitoring (ACCU-CHEK FASTCLIX) lancets Use to test blood sugar 2 times daily or as directed.   atorvastatin (LIPITOR) 10 MG tablet Take 1 tablet (10 mg) by mouth daily   aspirin 81 MG tablet Take 1 tablet (81 mg) by mouth daily   folic acid 800 MCG TABS    IRON PO      No current facility-administered medications on file prior to visit.     Past Surgical History:   Procedure Laterality Date     ABDOMEN SURGERY       APPENDECTOMY       BIOPSY       COLONOSCOPY       ENT SURGERY         Social History     Social History     Marital status:      Spouse name: N/A     Number of children: N/A     Years of education: N/A     Occupational History     Not on file.     Social History Main Topics     Smoking status: Former Smoker     Types: Cigarettes, Other     Start date: 1/1/1975     Quit date: 1/1/2015     Smokeless tobacco: Never Used      Comment: I use Ecig     Alcohol use 0.0 oz/week     Drug use: No     Sexual activity: Not Currently     Partners: Male     Birth control/ protection: None     Other Topics Concern     Parent/Sibling W/ Cabg, Mi Or Angioplasty Before 65f 55m? No     Social History Narrative       REVIEW OF SYSTEMS  General: negative for fever  Resp: negative for chest pain   CV: negative for chest pain  : negative for dysuria , incontinence, frequency  Musculoskeletal: as above  Neurologic: negative for ataxia, saddle anesthesia, fecal incontinence, one sided weakness,  paresthesias    Physical Exam:  Vitals: /76  Pulse 75  Temp 99  F (37.2  C) (Oral)  SpO2 97%  BMI= There is no height or weight on file to calculate BMI.  Constitutional: healthy, alert and no acute distress   CARDIO: RRR, no  MRG  RESP: lungs CTA, NAD  NEURO: Patellar reflexes intact and equal b/l  BACK:  Straight leg raise intact. No paraspinal muscle TTP, strength intact and equal b/l lower extremities. Changing position from sitting to supine reproduces pain.  GAIT: intact  Psychiatric: mentation appears normal and affect normal/bright  Decreased forward flexion with reproduction of pain r thoraco lumbar area, just to the right of the spine.  Hips trochanteric areas NT. Mild right groin tenderness    Impression:     ICD-10-CM    1. Acute right-sided thoracic back pain M54.6 naproxen (NAPROSYN) 500 MG tablet     cyclobenzaprine (FLEXERIL) 10 MG tablet   2. Sprain of thoracic region S23.9XXA        Plan: Pain r thoracolumbar area seems musculoskeletal in nature. Possibly secondary from walking differently due to R groin strain.  Instructions for back care and return precautions discussed.  Follow up with PCP 2 weeks if not better.      Siria Hampton PA-C

## 2017-08-15 NOTE — NURSING NOTE
"Chief Complaint   Patient presents with     Back Pain       Initial /76  Pulse 75  Temp 99  F (37.2  C) (Oral)  SpO2 97% Estimated body mass index is 35.58 kg/(m^2) as calculated from the following:    Height as of 6/30/17: 5' 8\" (1.727 m).    Weight as of 8/7/17: 234 lb (106.1 kg).  Medication Reconciliation: complete  "

## 2017-08-15 NOTE — MR AVS SNAPSHOT
After Visit Summary   8/15/2017    Wendy Dodd    MRN: [id number]           Patient Information     Date Of Birth          1960        Visit Information        Provider Department      8/15/2017 3:40 PM Siria Hampton PA-C [facility]        Today's Diagnoses     Acute right-sided thoracic back pain    -  1    Sprain of thoracic region           Follow-ups after your visit        Your next 10 appointments already scheduled     Aug 29, 2017  3:00 PM CDT   Diabetic Education with AN DIABETIC ED RESOURCE   [facility] ([facility])    [address] [facility] [address] [phone]              Who to contact     If you have questions or need follow up information about today's clinic visit or your schedule please contact [facility] directly at [phone].  Normal or non-critical lab and imaging results will be communicated to you by BlogGluehart, letter or phone within 4 business days after the clinic has received the results. If you do not hear from us within 7 days, please contact the clinic through BlogGluehart or phone. If you have a critical or abnormal lab result, we will notify you by phone as soon as possible.  Submit refill requests through Active Media or call your pharmacy and they will forward the refill request to us. Please allow 3 business days for your refill to be completed.          Additional Information About Your Visit        MyChart Information     [employer] Media gives you secure access to your electronic health record. If you see a primary care provider, you can also send messages to your care team and make appointments. If you have questions, please call your primary care clinic.  If you do not have a primary care provider, please call [phone] and they will assist you.        Care EveryWhere ID     This is your Care EveryWhere ID. This could be used by other organizations to access your [facility]  records  [id number]        Your Vitals Were     Pulse Temperature Pulse Oximetry             75 99  F (37.2  C) (Oral) 97%          Blood Pressure from Last 3 Encounters:   08/15/17 121/76   08/07/17 114/73   06/30/17 116/70    Weight from Last 3 Encounters:   08/07/17 234 lb (106.1 kg)   06/30/17 235 lb (106.6 kg)   05/03/17 243 lb (110.2 kg)              Today, you had the following     No orders found for display         Today's Medication Changes          These changes are accurate as of: 8/15/17  4:12 PM.  If you have any questions, ask your nurse or doctor.               Start taking these medicines.        Dose/Directions    cyclobenzaprine 10 MG tablet   Commonly known as:  FLEXERIL   Used for:  Acute right-sided thoracic back pain   Started by:  Siria Hampton PA-C        Dose:  10 mg   Take 1 tablet (10 mg) by mouth nightly as needed for muscle spasms   Quantity:  14 tablet   Refills:  1       naproxen 500 MG tablet   Commonly known as:  NAPROSYN   Used for:  Acute right-sided thoracic back pain   Started by:  Siria Hampton PA-C        Dose:  500 mg   Take 1 tablet (500 mg) by mouth 2 times daily (with meals)   Quantity:  30 tablet   Refills:  0            Where to get your medicines      These medications were sent to [id number] - [place] - [address]  [address] [address]     Phone:  [phone]     cyclobenzaprine 10 MG tablet    naproxen 500 MG tablet                Primary Care Provider Office Phone # Fax #    Kristen M Kehr, PA-C [phone] [address] [facility] [address]        Equal Access to Services     [facility] AH: Hadjose Brooks, waroyceda luqadaha, qaybta kaalmada adeegyada, toby varela. So [facility] [phone].    ATENCIÓN: Si habla español, tiene a freire disposición servicios gratuitos de asistencia lingüística. Llame al [phone].    We comply with applicable federal civil  rights laws and [place] laws. We do not discriminate on the basis of race, color, national origin, age, disability sex, sexual orientation or gender identity.            Thank you!     [facility] you for [facility]. Our goal is always to provide you with excellent care. Hearing back from our patients is one way we can continue to improve our services. Please take a few minutes to complete the written survey that you may receive in the mail after your visit with us. Thank you!             Your Updated Medication List - Protect others around you: Learn how to safely use, store and throw away your medicines at www.disposemymeds.org.          This list is accurate as of: 8/15/17  4:12 PM.  Always use your most recent med list.                   Brand Name Dispense Instructions for use Diagnosis    aspirin 81 MG tablet     30 tablet    Take 1 tablet (81 mg) by mouth daily    Type 2 diabetes mellitus without complication, without long-term current use of insulin (H)       atorvastatin 10 MG tablet    LIPITOR    90 tablet    Take 1 tablet (10 mg) by mouth daily    Type 2 diabetes mellitus without complication, without long-term current use of insulin (H)       blood glucose monitoring lancets     1 Box    Use to test blood sugar 2 times daily or as directed.        blood glucose monitoring test strip    ACCU-CHEK RENY PLUS    200 strip    Use to test blood sugar 2 times daily or as directed.        cyclobenzaprine 10 MG tablet    FLEXERIL    14 tablet    Take 1 tablet (10 mg) by mouth nightly as needed for muscle spasms    Acute right-sided thoracic back pain       diltiazem 360 MG 24 hr capsule    TIAZAC    90 capsule    Take 1 capsule (360 mg) by mouth daily    Benign essential hypertension       folic acid 800 MCG Tabs           hydrochlorothiazide 25 MG tablet    HYDRODIURIL    90 tablet    Take 1 tablet (25 mg) by mouth daily    Benign essential hypertension       IRON PO            levothyroxine 125 MCG tablet    SYNTHROID/LEVOTHROID    90 tablet    Take 1 tablet (125 mcg) by mouth daily    Hypothyroidism, unspecified type       lisinopril 10 MG tablet    PRINIVIL/ZESTRIL    90 tablet    Take 1 tablet (10 mg) by mouth daily    Benign essential hypertension       metFORMIN 500 MG 24 hr tablet    GLUCOPHAGE-XR    360 tablet    Take 2 tablets (1,000 mg) by mouth 2 times daily (with meals)    Type 2 diabetes mellitus without complication, without long-term current use of insulin (H)       naproxen 500 MG tablet    NAPROSYN    30 tablet    Take 1 tablet (500 mg) by mouth 2 times daily (with meals)    Acute right-sided thoracic back pain

## 2017-10-18 DIAGNOSIS — E11.9 TYPE 2 DIABETES MELLITUS WITHOUT COMPLICATION, WITHOUT LONG-TERM CURRENT USE OF INSULIN (H): ICD-10-CM

## 2017-10-18 DIAGNOSIS — I10 BENIGN ESSENTIAL HYPERTENSION: ICD-10-CM

## 2017-10-18 RX ORDER — ATORVASTATIN CALCIUM 10 MG/1
TABLET, FILM COATED ORAL
Qty: 90 TABLET | Refills: 1 | Status: SHIPPED | OUTPATIENT
Start: 2017-10-18 | End: 2018-03-12

## 2017-10-19 RX ORDER — LISINOPRIL 10 MG/1
TABLET ORAL
Qty: 90 TABLET | Refills: 0 | Status: SHIPPED | OUTPATIENT
Start: 2017-10-19 | End: 2018-01-12

## 2017-11-09 DIAGNOSIS — I10 BENIGN ESSENTIAL HYPERTENSION: ICD-10-CM

## 2017-11-09 RX ORDER — DILTIAZEM HYDROCHLORIDE 360 MG/1
CAPSULE, EXTENDED RELEASE ORAL
Qty: 90 CAPSULE | Refills: 1 | Status: SHIPPED | OUTPATIENT
Start: 2017-11-09 | End: 2018-03-12

## 2017-11-09 NOTE — TELEPHONE ENCOUNTER
Routing refill request to provider for review/approval because:  Drug not on the [facility] refill protocol   Concha Martin RN

## 2017-11-16 DIAGNOSIS — I10 BENIGN ESSENTIAL HYPERTENSION: ICD-10-CM

## 2017-11-17 RX ORDER — HYDROCHLOROTHIAZIDE 25 MG/1
TABLET ORAL
Qty: 90 TABLET | Refills: 0 | Status: SHIPPED | OUTPATIENT
Start: 2017-11-17 | End: 2018-02-11

## 2018-01-12 DIAGNOSIS — I10 BENIGN ESSENTIAL HYPERTENSION: ICD-10-CM

## 2018-01-12 RX ORDER — LISINOPRIL 10 MG/1
TABLET ORAL
Qty: 90 TABLET | Refills: 0 | Status: SHIPPED | OUTPATIENT
Start: 2018-01-12 | End: 2018-03-12

## 2018-02-01 ENCOUNTER — TRANSFERRED RECORDS (OUTPATIENT)
Dept: HEALTH INFORMATION MANAGEMENT | Facility: CLINIC | Age: 58
End: 2018-02-01

## 2018-03-02 DIAGNOSIS — E11.9 TYPE 2 DIABETES MELLITUS WITHOUT COMPLICATION, WITHOUT LONG-TERM CURRENT USE OF INSULIN (H): ICD-10-CM

## 2018-03-02 RX ORDER — METFORMIN HCL 500 MG
TABLET, EXTENDED RELEASE 24 HR ORAL
Qty: 360 TABLET | Refills: 0 | Status: SHIPPED | OUTPATIENT
Start: 2018-03-02 | End: 2018-03-12

## 2018-03-12 ENCOUNTER — OFFICE VISIT (OUTPATIENT)
Dept: FAMILY MEDICINE | Facility: CLINIC | Age: 58
End: 2018-03-12
Payer: COMMERCIAL

## 2018-03-12 VITALS
SYSTOLIC BLOOD PRESSURE: 122 MMHG | RESPIRATION RATE: 16 BRPM | BODY MASS INDEX: 36.26 KG/M2 | HEIGHT: 67 IN | HEART RATE: 79 BPM | DIASTOLIC BLOOD PRESSURE: 79 MMHG | WEIGHT: 231 LBS | TEMPERATURE: 98.7 F | OXYGEN SATURATION: 97 %

## 2018-03-12 DIAGNOSIS — I10 BENIGN ESSENTIAL HYPERTENSION: ICD-10-CM

## 2018-03-12 DIAGNOSIS — E03.9 HYPOTHYROIDISM, UNSPECIFIED TYPE: ICD-10-CM

## 2018-03-12 DIAGNOSIS — E11.9 TYPE 2 DIABETES MELLITUS WITHOUT COMPLICATION, WITHOUT LONG-TERM CURRENT USE OF INSULIN (H): Primary | ICD-10-CM

## 2018-03-12 DIAGNOSIS — Z13.5 SCREENING FOR DIABETIC RETINOPATHY: ICD-10-CM

## 2018-03-12 DIAGNOSIS — M54.6 ACUTE RIGHT-SIDED THORACIC BACK PAIN: ICD-10-CM

## 2018-03-12 LAB — HBA1C MFR BLD: 7.1 % (ref 4.3–6)

## 2018-03-12 PROCEDURE — 36415 COLL VENOUS BLD VENIPUNCTURE: CPT | Performed by: PHYSICIAN ASSISTANT

## 2018-03-12 PROCEDURE — 83036 HEMOGLOBIN GLYCOSYLATED A1C: CPT | Performed by: PHYSICIAN ASSISTANT

## 2018-03-12 PROCEDURE — 99214 OFFICE O/P EST MOD 30 MIN: CPT | Performed by: PHYSICIAN ASSISTANT

## 2018-03-12 RX ORDER — ATORVASTATIN CALCIUM 10 MG/1
10 TABLET, FILM COATED ORAL DAILY
Qty: 90 TABLET | Refills: 1 | Status: SHIPPED | OUTPATIENT
Start: 2018-03-12 | End: 2018-09-10

## 2018-03-12 RX ORDER — METFORMIN HCL 500 MG
TABLET, EXTENDED RELEASE 24 HR ORAL
Qty: 360 TABLET | Refills: 1 | Status: SHIPPED | OUTPATIENT
Start: 2018-03-12 | End: 2018-09-10

## 2018-03-12 RX ORDER — LANCETS
EACH MISCELLANEOUS
Qty: 102 EACH | Refills: 11 | Status: SHIPPED | OUTPATIENT
Start: 2018-03-12

## 2018-03-12 RX ORDER — NAPROXEN 500 MG/1
500 TABLET ORAL 2 TIMES DAILY WITH MEALS
Qty: 30 TABLET | Refills: 3 | Status: SHIPPED | OUTPATIENT
Start: 2018-03-12 | End: 2021-04-07

## 2018-03-12 RX ORDER — LEVOTHYROXINE SODIUM 125 UG/1
125 TABLET ORAL DAILY
Qty: 90 TABLET | Refills: 3 | Status: SHIPPED | OUTPATIENT
Start: 2018-03-12 | End: 2018-09-10

## 2018-03-12 RX ORDER — HYDROCHLOROTHIAZIDE 25 MG/1
25 TABLET ORAL DAILY
Qty: 90 TABLET | Refills: 1 | Status: SHIPPED | OUTPATIENT
Start: 2018-03-12 | End: 2018-09-10

## 2018-03-12 RX ORDER — DILTIAZEM HYDROCHLORIDE 360 MG/1
CAPSULE, EXTENDED RELEASE ORAL
Qty: 90 CAPSULE | Refills: 1 | Status: SHIPPED | OUTPATIENT
Start: 2018-03-12 | End: 2018-09-10

## 2018-03-12 RX ORDER — LISINOPRIL 10 MG/1
10 TABLET ORAL DAILY
Qty: 90 TABLET | Refills: 1 | Status: SHIPPED | OUTPATIENT
Start: 2018-03-12 | End: 2018-09-10

## 2018-03-12 ASSESSMENT — PAIN SCALES - GENERAL: PAINLEVEL: NO PAIN (0)

## 2018-03-12 NOTE — PATIENT INSTRUCTIONS
Appointment in 6 months for diabetes recheck  Lab tests prior to appointment if possible      Release of information for [facility] in [place]

## 2018-03-12 NOTE — NURSING NOTE
"Chief Complaint   Patient presents with     Diabetes       Initial /79  Pulse 79  Temp 98.7  F (37.1  C) (Oral)  Resp 16  Ht 5' 7\" (1.702 m)  Wt 231 lb (104.8 kg)  SpO2 97%  BMI 36.18 kg/m2 Estimated body mass index is 36.18 kg/(m^2) as calculated from the following:    Height as of this encounter: 5' 7\" (1.702 m).    Weight as of this encounter: 231 lb (104.8 kg).  Medication Reconciliation: complete    STEPHANIE Raymundo MA    "

## 2018-03-12 NOTE — MR AVS SNAPSHOT
After Visit Summary   3/12/2018    Wendy Dodd    MRN: [id number]           Patient Information     Date Of Birth          1960        Visit Information        Provider Department      3/12/2018 4:00 PM Kehr, Kristen M, PA-C [facility]        Today's Diagnoses     Type 2 diabetes mellitus without complication, without long-term current use of insulin (H)    -  1    Screening for diabetic retinopathy        Benign essential hypertension        Acute right-sided thoracic back pain        Hypothyroidism, unspecified type          Care Instructions    Appointment in 6 months for diabetes recheck  Lab tests prior to appointment if possible      Release of information for [facility] in [place]          Follow-ups after your visit        Your next 10 appointments already scheduled     Mar 17, 2018 10:30 AM CDT   LAB with AN LAB   [facility] ([facility])    [address] [facility] [address] [phone]           Please do not eat 10-12 hours before your appointment if you are coming in fasting for labs on lipids, cholesterol, or glucose (sugar). This does not apply to pregnant women. Water, hot tea and black coffee (with nothing added) are okay. Do not drink other fluids, diet soda or chew gum.              Future tests that were ordered for you today     Open Future Orders        Priority Expected Expires Ordered    Renal panel Routine 3/17/2018 9/12/2018 3/12/2018    TSH with free T4 reflex Routine 3/17/2018 9/12/2018 3/12/2018    Lipid panel reflex to direct LDL Fasting Routine 3/17/2018 9/12/2018 3/12/2018            Who to contact     If you have questions or need follow up information about today's clinic visit or your schedule please contact [facility] directly at [phone].  Normal or non-critical lab and imaging results will be communicated to you by MyChart, letter or phone within 4 business days after the  "clinic has received the results. If you do not hear from us within 7 days, please contact the clinic through MyEnergy or phone. If you have a critical or abnormal lab result, we will notify you by phone as soon as possible.  Submit refill requests through MyEnergy or call your pharmacy and they will forward the refill request to us. Please allow 3 business days for your refill to be completed.          Additional Information About Your Visit        MyEnergy Information     [employer] gives you secure access to your electronic health record. If you see a primary care provider, you can also send messages to your care team and make appointments. If you have questions, please call your primary care clinic.  If you do not have a primary care provider, please call [phone] and they will assist you.        Care EveryWhere ID     This is your Care EveryWhere ID. This could be used by other organizations to access your [facility] medical records  [id number]        Your Vitals Were     Pulse Temperature Respirations Height Pulse Oximetry BMI (Body Mass Index)    79 98.7  F (37.1  C) (Oral) 16 5' 7\" (1.702 m) 97% 36.18 kg/m2       Blood Pressure from Last 3 Encounters:   03/12/18 122/79   08/15/17 121/76   08/07/17 114/73    Weight from Last 3 Encounters:   03/12/18 231 lb (104.8 kg)   08/07/17 234 lb (106.1 kg)   06/30/17 235 lb (106.6 kg)              We Performed the Following     HEMOGLOBIN A1C          Today's Medication Changes          These changes are accurate as of 3/12/18  4:36 PM.  If you have any questions, ask your nurse or doctor.               These medicines have changed or have updated prescriptions.        Dose/Directions    atorvastatin 10 MG tablet   Commonly known as:  LIPITOR   This may have changed:  See the new instructions.   Used for:  Type 2 diabetes mellitus without complication, without long-term current use of insulin (H)   Changed by:  Kehr, Kristen M, PA-C        Dose:  10 mg   Take 1 tablet (10 " mg) by mouth daily   Quantity:  90 tablet   Refills:  1       diltiazem 360 MG 24 hr capsule   Commonly known as:  TIAZAC   This may have changed:  See the new instructions.   Used for:  Benign essential hypertension   Changed by:  Kehr, Kristen M, PA-C        TAKE ONE CAPSULE BY MOUTH ONCE DAILY   Quantity:  90 capsule   Refills:  1       hydrochlorothiazide 25 MG tablet   Commonly known as:  HYDRODIURIL   This may have changed:  See the new instructions.   Used for:  Benign essential hypertension   Changed by:  Kehr, Kristen M, PA-C        Dose:  25 mg   Take 1 tablet (25 mg) by mouth daily   Quantity:  90 tablet   Refills:  1       lisinopril 10 MG tablet   Commonly known as:  PRINIVIL/ZESTRIL   This may have changed:  See the new instructions.   Used for:  Benign essential hypertension   Changed by:  Kehr, Kristen M, PA-C        Dose:  10 mg   Take 1 tablet (10 mg) by mouth daily   Quantity:  90 tablet   Refills:  1       metFORMIN 500 MG 24 hr tablet   Commonly known as:  GLUCOPHAGE-XR   This may have changed:  See the new instructions.   Used for:  Type 2 diabetes mellitus without complication, without long-term current use of insulin (H)   Changed by:  Kehr, Kristen M, PA-C        TAKE TWO TABLETS BY MOUTH TWICE A DAY WITH MEALS   Quantity:  360 tablet   Refills:  1            Where to get your medicines      These medications were sent to [id number] - [place] - [address]  [address] [facility] [place] [address]     Phone:  [phone]     atorvastatin 10 MG tablet    blood glucose monitoring lancets    blood glucose monitoring test strip    diltiazem 360 MG 24 hr capsule    hydrochlorothiazide 25 MG tablet    levothyroxine 125 MCG tablet    lisinopril 10 MG tablet    metFORMIN 500 MG 24 hr tablet    naproxen 500 MG tablet                Primary Care Provider Office Phone # Fax #    Kristen M Kehr, PA-C [phone] [address] [facility] [address]        Equal  Access to Services     [facility] : Hadii aad ku hadmikkisudheer Esmerhardik, waaxda luqadaha, qaybta kaalmatoby smith. So [facility] [phone].    ATENCIÓN: Si habla español, tiene a freire disposición servicios gratuitos de asistencia lingüística. Llame al [phone].    We comply with applicable federal civil rights laws and [place] laws. We do not discriminate on the basis of race, color, national origin, age, disability, sex, sexual orientation, or gender identity.            Thank you!     Thank you for choosing [facility]  [facility] your [facility]. Our goal is always to provide you with excellent care. Hearing back from our patients is one way we can continue to improve our services. Please take a few minutes to complete the written survey that you may receive in the mail after your visit with us. Thank you!             Your Updated Medication List - Protect others around you: Learn how to safely use, store and throw away your medicines at www.disposemymeds.org.          This list is accurate as of 3/12/18  4:36 PM.  Always use your most recent med list.                   Brand Name Dispense Instructions for use Diagnosis    aspirin 81 MG tablet     30 tablet    Take 1 tablet (81 mg) by mouth daily    Type 2 diabetes mellitus without complication, without long-term current use of insulin (H)       atorvastatin 10 MG tablet    LIPITOR    90 tablet    Take 1 tablet (10 mg) by mouth daily    Type 2 diabetes mellitus without complication, without long-term current use of insulin (H)       blood glucose monitoring lancets     102 each    Use to test blood sugar 2 times daily or as directed.    Type 2 diabetes mellitus without complication, without long-term current use of insulin (H)       blood glucose monitoring test strip    ACCU-CHEK RENY PLUS    200 strip    Use to test blood sugar 2 times daily or as directed.    Type 2 diabetes mellitus without complication, without  long-term current use of insulin (H)       diltiazem 360 MG 24 hr capsule    TIAZAC    90 capsule    TAKE ONE CAPSULE BY MOUTH ONCE DAILY    Benign essential hypertension       folic acid 800 MCG Tabs           hydrochlorothiazide 25 MG tablet    HYDRODIURIL    90 tablet    Take 1 tablet (25 mg) by mouth daily    Benign essential hypertension       IRON PO           levothyroxine 125 MCG tablet    SYNTHROID/LEVOTHROID    90 tablet    Take 1 tablet (125 mcg) by mouth daily    Hypothyroidism, unspecified type       lisinopril 10 MG tablet    PRINIVIL/ZESTRIL    90 tablet    Take 1 tablet (10 mg) by mouth daily    Benign essential hypertension       METAMUCIL PO           metFORMIN 500 MG 24 hr tablet    GLUCOPHAGE-XR    360 tablet    TAKE TWO TABLETS BY MOUTH TWICE A DAY WITH MEALS    Type 2 diabetes mellitus without complication, without long-term current use of insulin (H)       naproxen 500 MG tablet    NAPROSYN    30 tablet    Take 1 tablet (500 mg) by mouth 2 times daily (with meals)    Acute right-sided thoracic back pain

## 2018-03-12 NOTE — PROGRESS NOTES
SUBJECTIVE:   Wendy Dodd is a 57 year old female who presents to clinic today for the following health issues:      History of Present Illness     Diabetes:     Frequency of checking blood sugars::  1 time a day    Diabetic concerns::  None    Hypoglycemia symptoms::  None    Paraesthesia present::  No    Eye Exam in the last year::  Yes    1/30/18    Hypertension Follow-up      Outpatient blood pressures are not being checked.    Low Salt Diet: not monitoring salt    Hypothyroidism Follow-up      Since last visit, patient describes the following symptoms: Weight stable, no hair loss, no skin changes, no constipation, no loose stools    Problem list and histories reviewed & adjusted, as indicated.  Additional history: as documented      Patient Active Problem List   Diagnosis     Advanced directives, counseling/discussion     Diverticulosis of large intestine     External hemorrhoids     History of colonic polyps     Benign essential hypertension     Hypothyroidism, unspecified type     Non morbid obesity due to excess calories     Type 2 diabetes mellitus without complication, without long-term current use of insulin (H)     Past Surgical History:   Procedure Laterality Date     ABDOMEN SURGERY       APPENDECTOMY       BIOPSY       COLONOSCOPY       ENT SURGERY         Social History   Substance Use Topics     Smoking status: Former Smoker     Types: Cigarettes, Other     Start date: 1/1/1975     Quit date: 1/1/2015     Smokeless tobacco: Never Used      Comment: I use Ecig     Alcohol use 0.0 oz/week     Family History   Problem Relation Age of Onset     Colon Cancer Father      Other Cancer Father      liver     Other Cancer Mother      Other Cancer Brother      Skin Cancer Sister          Current Outpatient Prescriptions   Medication Sig Dispense Refill     metFORMIN (GLUCOPHAGE-XR) 500 MG 24 hr tablet TAKE TWO TABLETS BY MOUTH TWICE A DAY WITH MEALS 360 tablet 1     hydrochlorothiazide (HYDRODIURIL)  "25 MG tablet Take 1 tablet (25 mg) by mouth daily 90 tablet 1     lisinopril (PRINIVIL/ZESTRIL) 10 MG tablet Take 1 tablet (10 mg) by mouth daily 90 tablet 1     diltiazem (TIAZAC) 360 MG 24 hr capsule TAKE ONE CAPSULE BY MOUTH ONCE DAILY 90 capsule 1     atorvastatin (LIPITOR) 10 MG tablet Take 1 tablet (10 mg) by mouth daily 90 tablet 1     naproxen (NAPROSYN) 500 MG tablet Take 1 tablet (500 mg) by mouth 2 times daily (with meals) 30 tablet 3     levothyroxine (SYNTHROID/LEVOTHROID) 125 MCG tablet Take 1 tablet (125 mcg) by mouth daily 90 tablet 3     blood glucose monitoring (ACCU-CHEK RENY PLUS) test strip Use to test blood sugar 2 times daily or as directed. 200 strip 11     blood glucose monitoring (ACCU-CHEK FASTCLIX) lancets Use to test blood sugar 2 times daily or as directed. 102 each 11     Psyllium (METAMUCIL PO)        aspirin 81 MG tablet Take 1 tablet (81 mg) by mouth daily 30 tablet 11     folic acid 800 MCG TABS        IRON PO        Allergies   Allergen Reactions     Penicillins      Sulfa Drugs        ROS:  Constitutional, HEENT, cardiovascular, pulmonary, GI, , musculoskeletal, neuro, skin, endocrine and psych systems are negative, except as otherwise noted.    OBJECTIVE:     /79  Pulse 79  Temp 98.7  F (37.1  C) (Oral)  Resp 16  Ht 5' 7\" (1.702 m)  Wt 231 lb (104.8 kg)  SpO2 97%  BMI 36.18 kg/m2  Body mass index is 36.18 kg/(m^2).  GENERAL: healthy, alert and no distress  RESP: lungs clear to auscultation - no rales, rhonchi or wheezes  CV: regular rate and rhythm, normal S1 S2, no S3 or S4, no murmur, click or rub, no peripheral edema and peripheral pulses strong  MS: no gross musculoskeletal defects noted, no edema  SKIN: no suspicious lesions or rashes  NEURO: Normal strength and tone, mentation intact and speech normal  PSYCH: mentation appears normal, affect normal/bright    Diagnostic Test Results:  HGB A1c 7.1    ASSESSMENT/PLAN:         1. Type 2 diabetes mellitus " without complication, without long-term current use of insulin (H)  HGB A1C is better. She had increased the metformin to 1000 mg twice daily over the past 6 months.   She feels well and will continue all medications.   She has a fasting lab appointment scheduled for the remaining tests.   Encouraged to work with Diabetes Education and working on lifestyle changes with diet and exercise.   She will plan follow up again in 6 months.   - HEMOGLOBIN A1C  - metFORMIN (GLUCOPHAGE-XR) 500 MG 24 hr tablet; TAKE TWO TABLETS BY MOUTH TWICE A DAY WITH MEALS  Dispense: 360 tablet; Refill: 1  - atorvastatin (LIPITOR) 10 MG tablet; Take 1 tablet (10 mg) by mouth daily  Dispense: 90 tablet; Refill: 1  - blood glucose monitoring (ACCU-CHEK RENY PLUS) test strip; Use to test blood sugar 2 times daily or as directed.  Dispense: 200 strip; Refill: 11  - blood glucose monitoring (ACCU-CHEK FASTCLIX) lancets; Use to test blood sugar 2 times daily or as directed.  Dispense: 102 each; Refill: 11  - Renal panel; Future  - TSH with free T4 reflex; Future  - Lipid panel reflex to direct LDL Fasting; Future    2. Screening for diabetic retinopathy  She had a Diabetic eye exam by [facility] in [place] 2/2018, reports a normal exam    3. Benign essential hypertension  Stable, refills given  - hydrochlorothiazide (HYDRODIURIL) 25 MG tablet; Take 1 tablet (25 mg) by mouth daily  Dispense: 90 tablet; Refill: 1  - lisinopril (PRINIVIL/ZESTRIL) 10 MG tablet; Take 1 tablet (10 mg) by mouth daily  Dispense: 90 tablet; Refill: 1  - diltiazem (TIAZAC) 360 MG 24 hr capsule; TAKE ONE CAPSULE BY MOUTH ONCE DAILY  Dispense: 90 capsule; Refill: 1    4. Acute right-sided thoracic back pain  Refills given  - naproxen (NAPROSYN) 500 MG tablet; Take 1 tablet (500 mg) by mouth 2 times daily (with meals)  Dispense: 30 tablet; Refill: 3    5. Hypothyroidism, unspecified type  Refills given  - levothyroxine (SYNTHROID/LEVOTHROID) 125 MCG tablet; Take 1 tablet  (125 mcg) by mouth daily  Dispense: 90 tablet; Refill: 3      Kristen M. Kehr, PA-C  [facility]

## 2018-03-16 ENCOUNTER — MYC MEDICAL ADVICE (OUTPATIENT)
Dept: FAMILY MEDICINE | Facility: CLINIC | Age: 58
End: 2018-03-16

## 2018-03-17 DIAGNOSIS — E11.9 TYPE 2 DIABETES MELLITUS WITHOUT COMPLICATION, WITHOUT LONG-TERM CURRENT USE OF INSULIN (H): ICD-10-CM

## 2018-03-17 PROCEDURE — 80061 LIPID PANEL: CPT | Performed by: PHYSICIAN ASSISTANT

## 2018-03-17 PROCEDURE — 36415 COLL VENOUS BLD VENIPUNCTURE: CPT | Performed by: PHYSICIAN ASSISTANT

## 2018-03-17 PROCEDURE — 84443 ASSAY THYROID STIM HORMONE: CPT | Performed by: PHYSICIAN ASSISTANT

## 2018-03-17 PROCEDURE — 80069 RENAL FUNCTION PANEL: CPT | Performed by: PHYSICIAN ASSISTANT

## 2018-03-19 LAB
ALBUMIN SERPL-MCNC: 4.2 G/DL (ref 3.4–5)
ANION GAP SERPL CALCULATED.3IONS-SCNC: 6 MMOL/L (ref 3–14)
BUN SERPL-MCNC: 18 MG/DL (ref 7–30)
CALCIUM SERPL-MCNC: 10.2 MG/DL (ref 8.5–10.1)
CHLORIDE SERPL-SCNC: 104 MMOL/L (ref 94–109)
CHOLEST SERPL-MCNC: 105 MG/DL
CO2 SERPL-SCNC: 30 MMOL/L (ref 20–32)
CREAT SERPL-MCNC: 0.65 MG/DL (ref 0.52–1.04)
GFR SERPL CREATININE-BSD FRML MDRD: >90 ML/MIN/1.7M2
GLUCOSE SERPL-MCNC: 133 MG/DL (ref 70–99)
HDLC SERPL-MCNC: 49 MG/DL
LDLC SERPL CALC-MCNC: 37 MG/DL
NONHDLC SERPL-MCNC: 56 MG/DL
PHOSPHATE SERPL-MCNC: 2.8 MG/DL (ref 2.5–4.5)
POTASSIUM SERPL-SCNC: 5 MMOL/L (ref 3.4–5.3)
SODIUM SERPL-SCNC: 140 MMOL/L (ref 133–144)
TRIGL SERPL-MCNC: 95 MG/DL
TSH SERPL DL<=0.005 MIU/L-ACNC: 0.49 MU/L (ref 0.4–4)

## 2018-05-16 DIAGNOSIS — I10 BENIGN ESSENTIAL HYPERTENSION: ICD-10-CM

## 2018-05-16 RX ORDER — HYDROCHLOROTHIAZIDE 25 MG/1
TABLET ORAL
Qty: 90 TABLET | Refills: 0 | OUTPATIENT
Start: 2018-05-16

## 2018-06-16 ENCOUNTER — HEALTH MAINTENANCE LETTER (OUTPATIENT)
Age: 58
End: 2018-06-16

## 2018-08-08 ENCOUNTER — DOCUMENTATION ONLY (OUTPATIENT)
Dept: LAB | Facility: CLINIC | Age: 58
End: 2018-08-08

## 2018-08-08 DIAGNOSIS — E11.9 TYPE 2 DIABETES MELLITUS WITHOUT COMPLICATION, WITHOUT LONG-TERM CURRENT USE OF INSULIN (H): Primary | ICD-10-CM

## 2018-08-08 NOTE — PROGRESS NOTES
.Please place or confirm pending lab orders for upcoming lab appointment on 9/4/18  Thank you,  Maria Del Carmen

## 2018-08-08 NOTE — PROGRESS NOTES
Please review and sign Pending Pre-visit Labs in [facility].Labs 09/04/18 and DN check 09/10/18   Makenzie DIXON

## 2018-08-22 ENCOUNTER — TRANSFERRED RECORDS (OUTPATIENT)
Dept: HEALTH INFORMATION MANAGEMENT | Facility: CLINIC | Age: 58
End: 2018-08-22

## 2018-09-04 DIAGNOSIS — E11.9 TYPE 2 DIABETES MELLITUS WITHOUT COMPLICATION, WITHOUT LONG-TERM CURRENT USE OF INSULIN (H): ICD-10-CM

## 2018-09-04 LAB
ALBUMIN SERPL-MCNC: 3.9 G/DL (ref 3.4–5)
ANION GAP SERPL CALCULATED.3IONS-SCNC: 8 MMOL/L (ref 3–14)
BUN SERPL-MCNC: 15 MG/DL (ref 7–30)
CALCIUM SERPL-MCNC: 10.1 MG/DL (ref 8.5–10.1)
CHLORIDE SERPL-SCNC: 103 MMOL/L (ref 94–109)
CO2 SERPL-SCNC: 29 MMOL/L (ref 20–32)
CREAT SERPL-MCNC: 0.67 MG/DL (ref 0.52–1.04)
CREAT UR-MCNC: 23 MG/DL
GFR SERPL CREATININE-BSD FRML MDRD: 90 ML/MIN/1.7M2
GLUCOSE SERPL-MCNC: 147 MG/DL (ref 70–99)
HBA1C MFR BLD: 7.4 % (ref 0–5.6)
MICROALBUMIN UR-MCNC: <5 MG/L
MICROALBUMIN/CREAT UR: NORMAL MG/G CR (ref 0–25)
PHOSPHATE SERPL-MCNC: 2.8 MG/DL (ref 2.5–4.5)
POTASSIUM SERPL-SCNC: 3.8 MMOL/L (ref 3.4–5.3)
SODIUM SERPL-SCNC: 140 MMOL/L (ref 133–144)

## 2018-09-04 PROCEDURE — 80069 RENAL FUNCTION PANEL: CPT | Performed by: PHYSICIAN ASSISTANT

## 2018-09-04 PROCEDURE — 36415 COLL VENOUS BLD VENIPUNCTURE: CPT | Performed by: PHYSICIAN ASSISTANT

## 2018-09-04 PROCEDURE — 82043 UR ALBUMIN QUANTITATIVE: CPT | Performed by: PHYSICIAN ASSISTANT

## 2018-09-04 PROCEDURE — 83036 HEMOGLOBIN GLYCOSYLATED A1C: CPT | Performed by: PHYSICIAN ASSISTANT

## 2018-09-10 ENCOUNTER — OFFICE VISIT (OUTPATIENT)
Dept: FAMILY MEDICINE | Facility: CLINIC | Age: 58
End: 2018-09-10
Payer: COMMERCIAL

## 2018-09-10 VITALS
DIASTOLIC BLOOD PRESSURE: 84 MMHG | OXYGEN SATURATION: 97 % | SYSTOLIC BLOOD PRESSURE: 114 MMHG | BODY MASS INDEX: 36.49 KG/M2 | HEART RATE: 69 BPM | TEMPERATURE: 98.6 F | WEIGHT: 233 LBS

## 2018-09-10 DIAGNOSIS — Z12.31 VISIT FOR SCREENING MAMMOGRAM: ICD-10-CM

## 2018-09-10 DIAGNOSIS — I10 BENIGN ESSENTIAL HYPERTENSION: ICD-10-CM

## 2018-09-10 DIAGNOSIS — E11.9 TYPE 2 DIABETES MELLITUS WITHOUT COMPLICATION, WITHOUT LONG-TERM CURRENT USE OF INSULIN (H): ICD-10-CM

## 2018-09-10 DIAGNOSIS — E03.9 HYPOTHYROIDISM, UNSPECIFIED TYPE: ICD-10-CM

## 2018-09-10 DIAGNOSIS — Z13.89 SCREENING FOR DIABETIC PERIPHERAL NEUROPATHY: ICD-10-CM

## 2018-09-10 PROCEDURE — 99214 OFFICE O/P EST MOD 30 MIN: CPT | Performed by: PHYSICIAN ASSISTANT

## 2018-09-10 PROCEDURE — 99207 C FOOT EXAM  NO CHARGE: CPT | Mod: 25 | Performed by: PHYSICIAN ASSISTANT

## 2018-09-10 RX ORDER — LISINOPRIL 10 MG/1
10 TABLET ORAL DAILY
Qty: 90 TABLET | Refills: 1 | Status: SHIPPED | OUTPATIENT
Start: 2018-09-10 | End: 2019-03-18

## 2018-09-10 RX ORDER — ATORVASTATIN CALCIUM 10 MG/1
10 TABLET, FILM COATED ORAL DAILY
Qty: 90 TABLET | Refills: 1 | Status: SHIPPED | OUTPATIENT
Start: 2018-09-10 | End: 2019-03-18

## 2018-09-10 RX ORDER — DILTIAZEM HYDROCHLORIDE 360 MG/1
CAPSULE, EXTENDED RELEASE ORAL
Qty: 90 CAPSULE | Refills: 1 | Status: SHIPPED | OUTPATIENT
Start: 2018-09-10 | End: 2019-03-18

## 2018-09-10 RX ORDER — METFORMIN HCL 500 MG
TABLET, EXTENDED RELEASE 24 HR ORAL
Qty: 360 TABLET | Refills: 1 | Status: SHIPPED | OUTPATIENT
Start: 2018-09-10 | End: 2019-03-07

## 2018-09-10 RX ORDER — LEVOTHYROXINE SODIUM 125 UG/1
125 TABLET ORAL DAILY
Qty: 90 TABLET | Refills: 3 | Status: SHIPPED | OUTPATIENT
Start: 2018-09-10 | End: 2019-03-18

## 2018-09-10 RX ORDER — HYDROCHLOROTHIAZIDE 25 MG/1
25 TABLET ORAL DAILY
Qty: 90 TABLET | Refills: 1 | Status: SHIPPED | OUTPATIENT
Start: 2018-09-10 | End: 2019-03-18

## 2018-09-10 ASSESSMENT — PAIN SCALES - GENERAL: PAINLEVEL: NO PAIN (0)

## 2018-09-10 NOTE — PATIENT INSTRUCTIONS
Goal of 15-20 pounds in the next 6 months.     Raniditine 150 mg 1-2 times daily    Continue all other medications    Consider adding a non insulin injectable medication at your next appointment if A1C is high    Schedule mammogram

## 2018-09-10 NOTE — NURSING NOTE
"Chief Complaint   Patient presents with     Diabetes       Initial /84  Pulse 69  Temp 98.6  F (37  C) (Oral)  Wt 233 lb (105.7 kg)  SpO2 97%  BMI 36.49 kg/m2 Estimated body mass index is 36.49 kg/(m^2) as calculated from the following:    Height as of 3/12/18: 5' 7\" (1.702 m).    Weight as of this encounter: 233 lb (105.7 kg).  Medication Reconciliation: complete    STEPHANIE Raymundo MA    "

## 2018-09-10 NOTE — MR AVS SNAPSHOT
After Visit Summary   9/10/2018    Wendy Dodd    MRN: [id number]           Patient Information     Date Of Birth          1960        Visit Information        Provider Department      9/10/2018 4:40 PM Kehr, Kristen M, PA-C [facility]        Today's Diagnoses     Visit for screening mammogram        Screening for diabetic peripheral neuropathy        Type 2 diabetes mellitus without complication, without long-term current use of insulin (H)        Benign essential hypertension        Hypothyroidism, unspecified type          Care Instructions      Goal of 15-20 pounds in the next 6 months.     Raniditine 150 mg 1-2 times daily    Continue all other medications    Consider adding a non insulin injectable medication at your next appointment if A1C is high    Schedule mammogram          Follow-ups after your visit        Follow-up notes from your care team     Return in about 6 months (around 3/10/2019) for Lab Work fasting , diabetes.      Future tests that were ordered for you today     Open Future Orders        Priority Expected Expires Ordered    MA SCREENING DIGITAL BILAT - Future  (s+30) Routine  9/10/2019 9/10/2018            Who to contact     If you have questions or need follow up information about today's clinic visit or your schedule please contact [facility] directly at [phone].  Normal or non-critical lab and imaging results will be communicated to you by MyChart, letter or phone within 4 business days after the clinic has received the results. If you do not hear from us within 7 days, please contact the clinic through MyChart or phone. If you have a critical or abnormal lab result, we will notify you by phone as soon as possible.  Submit refill requests through [employer] or call your pharmacy and they will forward the refill request to us. Please allow 3 business days for your refill to be completed.          Additional Information About Your  Visit        [employer] Information     [employer] gives you secure access to your electronic health record. If you see a primary care provider, you can also send messages to your care team and make appointments. If you have questions, please call your primary care clinic.  If you do not have a primary care provider, please call [phone] and they will assist you.        Care EveryWhere ID     This is your Care EveryWhere ID. This could be used by other organizations to access your [place] medical records  [id number]        Your Vitals Were     Pulse Temperature Pulse Oximetry BMI (Body Mass Index)          69 98.6  F (37  C) (Oral) 97% 36.49 kg/m2         Blood Pressure from Last 3 Encounters:   09/10/18 114/84   03/12/18 122/79   08/15/17 121/76    Weight from Last 3 Encounters:   09/10/18 233 lb (105.7 kg)   03/12/18 231 lb (104.8 kg)   08/07/17 234 lb (106.1 kg)              We Performed the Following     FOOT EXAM  NO CHARGE [51812.114]          Where to get your medicines      These medications were sent to [id number] - [place] - [address]  [address] [address]     Phone:  [phone]     atorvastatin 10 MG tablet    diltiazem 360 MG 24 hr capsule    hydrochlorothiazide 25 MG tablet    levothyroxine 125 MCG tablet    lisinopril 10 MG tablet    metFORMIN 500 MG 24 hr tablet          Primary Care Provider Office Phone # Fax #    Kristen M Kehr, PA-C [phone] [address] [address] [facility] [address]        Equal Access to Services     [facility]: Hadii nanda ku hadasho Soomaali, waaxda luqadaha, qaybta kaalmada toby starks . So [facility] [phone].    ATENCIÓN: Si habla español, tiene a freire disposición servicios gratuitos de asistencia lingüística. Llame al [phone].    We comply with applicable federal civil rights laws and [place] laws. We do not discriminate on the basis of race, color, national origin, age,  disability, sex, sexual orientation, or gender identity.            Thank you!     Thank you for choosing [facility]  [facility]. Our goal is always to provide you with excellent care. Hearing back from our patients is one way we can continue to improve our services. Please take a few minutes to complete the written survey that you may receive in the mail after your visit with us. Thank you!             Your Updated Medication List - Protect others around you: Learn how to safely use, store and throw away your medicines at www.disposemymeds.org.          This list is accurate as of 9/10/18  5:09 PM.  Always use your most recent med list.                   Brand Name Dispense Instructions for use Diagnosis    aspirin 81 MG tablet     30 tablet    Take 1 tablet (81 mg) by mouth daily    Type 2 diabetes mellitus without complication, without long-term current use of insulin (H)       atorvastatin 10 MG tablet    LIPITOR    90 tablet    Take 1 tablet (10 mg) by mouth daily    Type 2 diabetes mellitus without complication, without long-term current use of insulin (H)       blood glucose monitoring lancets     102 each    Use to test blood sugar 2 times daily or as directed.    Type 2 diabetes mellitus without complication, without long-term current use of insulin (H)       blood glucose monitoring test strip    ACCU-CHEK RENY PLUS    200 strip    Use to test blood sugar 2 times daily or as directed.    Type 2 diabetes mellitus without complication, without long-term current use of insulin (H)       diltiazem 360 MG 24 hr capsule    TIAZAC    90 capsule    TAKE ONE CAPSULE BY MOUTH ONCE DAILY    Benign essential hypertension       folic acid 800 MCG Tabs           hydrochlorothiazide 25 MG tablet    HYDRODIURIL    90 tablet    Take 1 tablet (25 mg) by mouth daily    Benign essential hypertension       IRON PO           levothyroxine 125 MCG tablet    SYNTHROID/LEVOTHROID    90 tablet    Take 1 tablet  (125 mcg) by mouth daily    Hypothyroidism, unspecified type       lisinopril 10 MG tablet    PRINIVIL/ZESTRIL    90 tablet    Take 1 tablet (10 mg) by mouth daily    Benign essential hypertension       METAMUCIL PO           metFORMIN 500 MG 24 hr tablet    GLUCOPHAGE-XR    360 tablet    TAKE TWO TABLETS BY MOUTH TWICE A DAY WITH MEALS    Type 2 diabetes mellitus without complication, without long-term current use of insulin (H)       naproxen 500 MG tablet    NAPROSYN    30 tablet    Take 1 tablet (500 mg) by mouth 2 times daily (with meals)    Acute right-sided thoracic back pain

## 2018-09-10 NOTE — PROGRESS NOTES
SUBJECTIVE:   Wendy Dodd is a 57 year old female who presents to clinic today for the following health issues:    Wedny is here today for recheck of type 2 diabetes. She has been taking the metformin 2 times daily and has been feeling well.     History of Present Illness     Diabetes:     Frequency of checking blood sugars::  1 time a day    Diabetic concerns::  None    Hypoglycemia symptoms::  None    Paraesthesia present::  No    Eye Exam in the last year::  Yes    Jan 1 2018    Diabetes Management Resources    Hypertension Follow-up      Outpatient blood pressures are not being checked.    Low Salt Diet: not monitoring salt    Problem list and histories reviewed & adjusted, as indicated.  Additional history: as documented        Patient Active Problem List   Diagnosis     Advanced directives, counseling/discussion     Diverticulosis of large intestine     External hemorrhoids     History of colonic polyps     Benign essential hypertension     Hypothyroidism, unspecified type     Non morbid obesity due to excess calories     Type 2 diabetes mellitus without complication, without long-term current use of insulin (H)     Past Surgical History:   Procedure Laterality Date     ABDOMEN SURGERY       APPENDECTOMY       BIOPSY       COLONOSCOPY       ENT SURGERY         Social History   Substance Use Topics     Smoking status: Former Smoker     Types: Cigarettes, Other     Start date: 1/1/1975     Quit date: 1/1/2015     Smokeless tobacco: Never Used      Comment: I use Ecig     Alcohol use 0.0 oz/week     Family History   Problem Relation Age of Onset     Colon Cancer Father      Other Cancer Father      liver     Other Cancer Mother      Other Cancer Brother      Skin Cancer Sister          Current Outpatient Prescriptions   Medication Sig Dispense Refill     aspirin 81 MG tablet Take 1 tablet (81 mg) by mouth daily 30 tablet 11     atorvastatin (LIPITOR) 10 MG tablet Take 1 tablet (10 mg) by mouth daily  90 tablet 1     blood glucose monitoring (ACCU-CHEK RENY PLUS) test strip Use to test blood sugar 2 times daily or as directed. 200 strip 11     blood glucose monitoring (ACCU-CHEK FASTCLIX) lancets Use to test blood sugar 2 times daily or as directed. 102 each 11     diltiazem (TIAZAC) 360 MG 24 hr capsule TAKE ONE CAPSULE BY MOUTH ONCE DAILY 90 capsule 1     folic acid 800 MCG TABS        hydrochlorothiazide (HYDRODIURIL) 25 MG tablet Take 1 tablet (25 mg) by mouth daily 90 tablet 1     IRON PO        levothyroxine (SYNTHROID/LEVOTHROID) 125 MCG tablet Take 1 tablet (125 mcg) by mouth daily 90 tablet 3     lisinopril (PRINIVIL/ZESTRIL) 10 MG tablet Take 1 tablet (10 mg) by mouth daily 90 tablet 1     metFORMIN (GLUCOPHAGE-XR) 500 MG 24 hr tablet TAKE TWO TABLETS BY MOUTH TWICE A DAY WITH MEALS 360 tablet 1     naproxen (NAPROSYN) 500 MG tablet Take 1 tablet (500 mg) by mouth 2 times daily (with meals) 30 tablet 3     Psyllium (METAMUCIL PO)        Allergies   Allergen Reactions     Penicillins      Sulfa Drugs      Recent Labs   Lab Test  09/04/18   0707  03/17/18   0958  03/12/18   1609  08/07/17   1637   03/27/17   1027   A1C  7.4*   --   7.1*  7.5*   < >   --    LDL   --   37   --    --    --   91   HDL   --   49*   --    --    --   59   TRIG   --   95   --    --    --   155*   CR  0.67  0.65   --    --    --   0.63   GFRESTIMATED  90  >90   --    --    --   >90  Non  GFR Calc     GFRESTBLACK  >90  >90   --    --    --   >90   GFR Calc     POTASSIUM  3.8  5.0   --    --    --   4.5   TSH   --   0.49   --    --    --   1.80    < > = values in this interval not displayed.      BP Readings from Last 3 Encounters:   09/10/18 114/84   03/12/18 122/79   08/15/17 121/76    Wt Readings from Last 3 Encounters:   09/10/18 233 lb (105.7 kg)   03/12/18 231 lb (104.8 kg)   08/07/17 234 lb (106.1 kg)                    ROS:  Constitutional, HEENT, cardiovascular, pulmonary, GI, ,  musculoskeletal, neuro, skin, endocrine and psych systems are negative, except as otherwise noted.    OBJECTIVE:     /84  Pulse 69  Temp 98.6  F (37  C) (Oral)  Wt 233 lb (105.7 kg)  SpO2 97%  BMI 36.49 kg/m2  Body mass index is 36.49 kg/(m^2).  GENERAL: healthy, alert and no distress  RESP: lungs clear to auscultation - no rales, rhonchi or wheezes  CV: regular rate and rhythm, normal S1 S2, no S3 or S4, no murmur, click or rub, no peripheral edema and peripheral pulses strong  MS: no gross musculoskeletal defects noted, no edema  SKIN: no suspicious lesions or rashes  NEURO: Normal strength and tone, mentation intact and speech normal  PSYCH: mentation appears normal, affect normal/bright  Diabetic foot exam: normal DP and PT pulses, no trophic changes or ulcerative lesions and normal sensory exam    Diagnostic Test Results:  none     ASSESSMENT/PLAN:       1. Type 2 diabetes mellitus without complication, without long-term current use of insulin (H)  A1C is still above 7.   Discussed options of adding a noninsulin injection or aggressive lifestyle changes.   She declines meeting with Diabetes Education and planning to work on making lifestyle changes with a friend at work. She has a goal of 15-20 pounds in the next 6 months.   Recheck with fasting lab tests at that time.   - atorvastatin (LIPITOR) 10 MG tablet; Take 1 tablet (10 mg) by mouth daily  Dispense: 90 tablet; Refill: 1  - metFORMIN (GLUCOPHAGE-XR) 500 MG 24 hr tablet; TAKE TWO TABLETS BY MOUTH TWICE A DAY WITH MEALS  Dispense: 360 tablet; Refill: 1    2. Benign essential hypertension  Stable, refills given.   - diltiazem (TIAZAC) 360 MG 24 hr capsule; TAKE ONE CAPSULE BY MOUTH ONCE DAILY  Dispense: 90 capsule; Refill: 1  - hydrochlorothiazide (HYDRODIURIL) 25 MG tablet; Take 1 tablet (25 mg) by mouth daily  Dispense: 90 tablet; Refill: 1  - lisinopril (PRINIVIL/ZESTRIL) 10 MG tablet; Take 1 tablet (10 mg) by mouth daily  Dispense: 90 tablet;  Refill: 1    3. Visit for screening mammogram  - MA SCREENING DIGITAL BILAT - Future  (s+30); Future    4. Screening for diabetic peripheral neuropathy  - FOOT EXAM  NO CHARGE [84853.982]    5. Hypothyroidism, unspecified type  She is due for lab tests with next appointment. Refills given.   - levothyroxine (SYNTHROID/LEVOTHROID) 125 MCG tablet; Take 1 tablet (125 mcg) by mouth daily  Dispense: 90 tablet; Refill: 3      Kristen M. Kehr, PA-C  [facility]

## 2018-09-24 ENCOUNTER — MYC MEDICAL ADVICE (OUTPATIENT)
Dept: FAMILY MEDICINE | Facility: CLINIC | Age: 58
End: 2018-09-24

## 2018-09-24 DIAGNOSIS — K21.9 ESOPHAGEAL REFLUX: Primary | ICD-10-CM

## 2018-09-24 NOTE — TELEPHONE ENCOUNTER
Problem list is updated per Verbal Orders.  Per protocol, will route encounter to be cosigned by provider for Verbal Orders.  Paz May RN

## 2018-10-25 ENCOUNTER — RADIANT APPOINTMENT (OUTPATIENT)
Dept: MAMMOGRAPHY | Facility: CLINIC | Age: 58
End: 2018-10-25
Attending: PHYSICIAN ASSISTANT
Payer: COMMERCIAL

## 2018-10-25 DIAGNOSIS — Z12.31 VISIT FOR SCREENING MAMMOGRAM: ICD-10-CM

## 2018-10-25 PROCEDURE — 77067 SCR MAMMO BI INCL CAD: CPT | Mod: TC

## 2019-03-04 DIAGNOSIS — E03.9 HYPOTHYROIDISM, UNSPECIFIED TYPE: ICD-10-CM

## 2019-03-04 DIAGNOSIS — E11.9 TYPE 2 DIABETES MELLITUS WITHOUT COMPLICATION, WITHOUT LONG-TERM CURRENT USE OF INSULIN (H): ICD-10-CM

## 2019-03-04 LAB
ANION GAP SERPL CALCULATED.3IONS-SCNC: 5 MMOL/L (ref 3–14)
BUN SERPL-MCNC: 21 MG/DL (ref 7–30)
CALCIUM SERPL-MCNC: 9.9 MG/DL (ref 8.5–10.1)
CHLORIDE SERPL-SCNC: 106 MMOL/L (ref 94–109)
CHOLEST SERPL-MCNC: 123 MG/DL
CO2 SERPL-SCNC: 30 MMOL/L (ref 20–32)
CREAT SERPL-MCNC: 0.57 MG/DL (ref 0.52–1.04)
GFR SERPL CREATININE-BSD FRML MDRD: >90 ML/MIN/{1.73_M2}
GLUCOSE SERPL-MCNC: 118 MG/DL (ref 70–99)
HBA1C MFR BLD: 6.1 % (ref 0–5.6)
HDLC SERPL-MCNC: 53 MG/DL
LDLC SERPL CALC-MCNC: 52 MG/DL
NONHDLC SERPL-MCNC: 70 MG/DL
POTASSIUM SERPL-SCNC: 4 MMOL/L (ref 3.4–5.3)
SODIUM SERPL-SCNC: 141 MMOL/L (ref 133–144)
T4 FREE SERPL-MCNC: 1.53 NG/DL (ref 0.76–1.46)
TRIGL SERPL-MCNC: 92 MG/DL
TSH SERPL DL<=0.005 MIU/L-ACNC: 0.36 MU/L (ref 0.4–4)

## 2019-03-04 PROCEDURE — 84443 ASSAY THYROID STIM HORMONE: CPT | Performed by: PHYSICIAN ASSISTANT

## 2019-03-04 PROCEDURE — 36415 COLL VENOUS BLD VENIPUNCTURE: CPT | Performed by: PHYSICIAN ASSISTANT

## 2019-03-04 PROCEDURE — 80048 BASIC METABOLIC PNL TOTAL CA: CPT | Performed by: PHYSICIAN ASSISTANT

## 2019-03-04 PROCEDURE — 80061 LIPID PANEL: CPT | Performed by: PHYSICIAN ASSISTANT

## 2019-03-04 PROCEDURE — 84439 ASSAY OF FREE THYROXINE: CPT | Performed by: PHYSICIAN ASSISTANT

## 2019-03-04 PROCEDURE — 83036 HEMOGLOBIN GLYCOSYLATED A1C: CPT | Performed by: PHYSICIAN ASSISTANT

## 2019-03-07 DIAGNOSIS — E11.9 TYPE 2 DIABETES MELLITUS WITHOUT COMPLICATION, WITHOUT LONG-TERM CURRENT USE OF INSULIN (H): ICD-10-CM

## 2019-03-08 RX ORDER — METFORMIN HCL 500 MG
TABLET, EXTENDED RELEASE 24 HR ORAL
Qty: 120 TABLET | Refills: 0 | Status: SHIPPED | OUTPATIENT
Start: 2019-03-08 | End: 2019-03-18

## 2019-03-18 ENCOUNTER — OFFICE VISIT (OUTPATIENT)
Dept: FAMILY MEDICINE | Facility: CLINIC | Age: 59
End: 2019-03-18
Payer: COMMERCIAL

## 2019-03-18 VITALS
HEIGHT: 67 IN | HEART RATE: 68 BPM | SYSTOLIC BLOOD PRESSURE: 115 MMHG | BODY MASS INDEX: 31.86 KG/M2 | DIASTOLIC BLOOD PRESSURE: 75 MMHG | WEIGHT: 203 LBS | OXYGEN SATURATION: 96 % | TEMPERATURE: 98.3 F

## 2019-03-18 DIAGNOSIS — E03.9 HYPOTHYROIDISM, UNSPECIFIED TYPE: ICD-10-CM

## 2019-03-18 DIAGNOSIS — I10 BENIGN ESSENTIAL HYPERTENSION: ICD-10-CM

## 2019-03-18 DIAGNOSIS — E11.9 TYPE 2 DIABETES MELLITUS WITHOUT COMPLICATION, WITHOUT LONG-TERM CURRENT USE OF INSULIN (H): Primary | ICD-10-CM

## 2019-03-18 PROCEDURE — 99214 OFFICE O/P EST MOD 30 MIN: CPT | Performed by: PHYSICIAN ASSISTANT

## 2019-03-18 RX ORDER — ATORVASTATIN CALCIUM 10 MG/1
10 TABLET, FILM COATED ORAL DAILY
Qty: 90 TABLET | Refills: 1 | Status: SHIPPED | OUTPATIENT
Start: 2019-03-18 | End: 2019-09-19

## 2019-03-18 RX ORDER — LISINOPRIL 10 MG/1
10 TABLET ORAL DAILY
Qty: 90 TABLET | Refills: 1 | Status: SHIPPED | OUTPATIENT
Start: 2019-03-18 | End: 2019-09-19

## 2019-03-18 RX ORDER — DILTIAZEM HYDROCHLORIDE 360 MG/1
CAPSULE, EXTENDED RELEASE ORAL
Qty: 90 CAPSULE | Refills: 1 | Status: SHIPPED | OUTPATIENT
Start: 2019-03-18 | End: 2019-09-19

## 2019-03-18 RX ORDER — HYDROCHLOROTHIAZIDE 25 MG/1
25 TABLET ORAL DAILY
Qty: 90 TABLET | Refills: 1 | Status: SHIPPED | OUTPATIENT
Start: 2019-03-18 | End: 2019-09-19

## 2019-03-18 RX ORDER — METFORMIN HCL 500 MG
TABLET, EXTENDED RELEASE 24 HR ORAL
Qty: 360 TABLET | Refills: 1 | Status: SHIPPED | OUTPATIENT
Start: 2019-03-18 | End: 2019-09-19

## 2019-03-18 RX ORDER — LEVOTHYROXINE SODIUM 112 UG/1
112 TABLET ORAL DAILY
Qty: 90 TABLET | Refills: 3 | Status: SHIPPED | OUTPATIENT
Start: 2019-03-18 | End: 2019-09-19

## 2019-03-18 ASSESSMENT — MIFFLIN-ST. JEOR: SCORE: 1529.46

## 2019-03-18 ASSESSMENT — PAIN SCALES - GENERAL: PAINLEVEL: NO PAIN (0)

## 2019-03-18 NOTE — PROGRESS NOTES
SUBJECTIVE:   Wendy Dodd is a 58 year old female who presents to clinic today for the following health issues:      History of Present Illness     Diabetes:     Frequency of checking blood sugars::  Weekly    Diabetic concerns::  None    Hypoglycemia symptoms::  None    Paraesthesia present::  No    Eye Exam in the last year::  NO    Diabetes Management Resources    Diet:  Carbohydrate counting  Frequency of exercise:  2-3 days/week  Duration of exercise:  15-30 minutes  Taking medications regularly:  Yes  Medication side effects:  Other    PROBLEMS TO ADD ON...  Hypertension Follow-up      Outpatient blood pressures are not being checked.    Low Salt Diet: not monitoring salt    Hypothyroidism Follow-up      Since last visit, patient describes the following symptoms: Weight stable, no hair loss, no skin changes, no constipation, no loose stools    Problem list and histories reviewed & adjusted, as indicated.  Additional history: as documented      Patient Active Problem List   Diagnosis     Advanced directives, counseling/discussion     Diverticulosis of large intestine     External hemorrhoids     History of colonic polyps     Benign essential hypertension     Hypothyroidism, unspecified type     Non morbid obesity due to excess calories     Type 2 diabetes mellitus without complication, without long-term current use of insulin (H)     Esophageal reflux     Past Surgical History:   Procedure Laterality Date     ABDOMEN SURGERY       APPENDECTOMY       BIOPSY       COLONOSCOPY       ENT SURGERY         Social History     Tobacco Use     Smoking status: Former Smoker     Types: Cigarettes, Other     Start date: 1975     Last attempt to quit: 2015     Years since quittin.2     Smokeless tobacco: Never Used     Tobacco comment: I use Ecig   Substance Use Topics     Alcohol use: Yes     Alcohol/week: 0.0 oz     Family History   Problem Relation Age of Onset     Colon Cancer Father      Other  Cancer Father         liver     Other Cancer Mother      Other Cancer Brother      Skin Cancer Sister          Current Outpatient Medications   Medication Sig Dispense Refill     aspirin 81 MG tablet Take 1 tablet (81 mg) by mouth daily 30 tablet 11     atorvastatin (LIPITOR) 10 MG tablet Take 1 tablet (10 mg) by mouth daily 90 tablet 1     blood glucose monitoring (ACCU-CHEK RENY PLUS) test strip Use to test blood sugar 2 times daily or as directed. 200 strip 11     blood glucose monitoring (ACCU-CHEK FASTCLIX) lancets Use to test blood sugar 2 times daily or as directed. 102 each 11     diltiazem ER (TIAZAC) 360 MG 24 hr ER beaded capsule TAKE ONE CAPSULE BY MOUTH ONCE DAILY 90 capsule 1     folic acid 800 MCG TABS        hydrochlorothiazide (HYDRODIURIL) 25 MG tablet Take 1 tablet (25 mg) by mouth daily 90 tablet 1     IRON PO        levothyroxine (SYNTHROID/LEVOTHROID) 112 MCG tablet Take 1 tablet (112 mcg) by mouth daily 90 tablet 3     lisinopril (PRINIVIL/ZESTRIL) 10 MG tablet Take 1 tablet (10 mg) by mouth daily 90 tablet 1     metFORMIN (GLUCOPHAGE-XR) 500 MG 24 hr tablet 2 tablets twice daily with meals 360 tablet 1     naproxen (NAPROSYN) 500 MG tablet Take 1 tablet (500 mg) by mouth 2 times daily (with meals) 30 tablet 3     Psyllium (METAMUCIL PO)        ranitidine (ZANTAC) 150 MG tablet Take 1 tablet (150 mg) by mouth 2 times daily (Patient not taking: Reported on 3/18/2019) 180 tablet 1     Allergies   Allergen Reactions     Penicillins      Sulfa Drugs      Recent Labs   Lab Test 03/04/19  0710 09/04/18  0707 03/17/18  0958 03/12/18  1609  03/27/17  1027   A1C 6.1* 7.4*  --  7.1*   < >  --    LDL 52  --  37  --   --  91   HDL 53  --  49*  --   --  59   TRIG 92  --  95  --   --  155*   CR 0.57 0.67 0.65  --   --  0.63   GFRESTIMATED >90 90 >90  --   --  >90  Non  GFR Calc     GFRESTBLACK >90 >90 >90  --   --  >90  African American GFR Calc     POTASSIUM 4.0 3.8 5.0  --   --  4.5   TSH  "0.36*  --  0.49  --   --  1.80    < > = values in this interval not displayed.      BP Readings from Last 3 Encounters:   03/18/19 115/75   09/10/18 114/84   03/12/18 122/79    Wt Readings from Last 3 Encounters:   03/18/19 92.1 kg (203 lb)   09/10/18 105.7 kg (233 lb)   03/12/18 104.8 kg (231 lb)                    ROS:  Constitutional, HEENT, cardiovascular, pulmonary, GI, , musculoskeletal, neuro, skin, endocrine and psych systems are negative, except as otherwise noted.    OBJECTIVE:     /75   Pulse 68   Temp 98.3  F (36.8  C) (Oral)   Ht 1.695 m (5' 6.75\")   Wt 92.1 kg (203 lb)   SpO2 96%   BMI 32.03 kg/m    Body mass index is 32.03 kg/m .  GENERAL: healthy, alert and no distress  NECK: no adenopathy, no asymmetry, masses, or scars and thyroid normal to palpation  RESP: lungs clear to auscultation - no rales, rhonchi or wheezes  CV: regular rate and rhythm, normal S1 S2, no S3 or S4, no murmur, click or rub, no peripheral edema and peripheral pulses strong  MS: no gross musculoskeletal defects noted, no edema  SKIN: no suspicious lesions or rashes  NEURO: Normal strength and tone, mentation intact and speech normal  PSYCH: mentation appears normal, affect normal/bright    Diagnostic Test Results:  none     ASSESSMENT/PLAN:     1. Type 2 diabetes mellitus without complication, without long-term current use of insulin (H)  Continue her current medications. She is doing well.   Encouraged healthy lifestyle changes   Follow up in 6 months.   Encouraged to get her annual eye exam.   - atorvastatin (LIPITOR) 10 MG tablet; Take 1 tablet (10 mg) by mouth daily  Dispense: 90 tablet; Refill: 1  - metFORMIN (GLUCOPHAGE-XR) 500 MG 24 hr tablet; 2 tablets twice daily with meals  Dispense: 360 tablet; Refill: 1    2. Hypothyroidism, unspecified type  Above results reviewed and thyroid medication needs adjustment.   Adjust medication to 112 mcg from the 125 mcg and recheck in 2 months with a lab only " appointment.   - levothyroxine (SYNTHROID/LEVOTHROID) 112 MCG tablet; Take 1 tablet (112 mcg) by mouth daily  Dispense: 90 tablet; Refill: 3    3. Benign essential hypertension  Stable, refills given.   - diltiazem ER (TIAZAC) 360 MG 24 hr ER beaded capsule; TAKE ONE CAPSULE BY MOUTH ONCE DAILY  Dispense: 90 capsule; Refill: 1  - hydrochlorothiazide (HYDRODIURIL) 25 MG tablet; Take 1 tablet (25 mg) by mouth daily  Dispense: 90 tablet; Refill: 1  - lisinopril (PRINIVIL/ZESTRIL) 10 MG tablet; Take 1 tablet (10 mg) by mouth daily  Dispense: 90 tablet; Refill: 1    Follow up in 6 months with office visit.     Kristen M. Kehr, PA-C  [facility]

## 2019-03-18 NOTE — PATIENT INSTRUCTIONS
Patient Education     Hypothyroidism       You have hypothyroidism. This means your thyroid gland is not making enough thyroid hormone. This hormone is vital to body growth and metabolism. If you don t make enough, many body processes slow down. This can cause symptoms throughout the body. Hypothyroidism can range from mild to severe. The most severe form is called myxedema.  There are a number of causes of hypothyroidism. A common cause is Hashimoto s disease. This disease causes the body s own immune system to attack the thyroid gland. When you have certain treatments, such as surgery to remove the thyroid gland, this can also cause hypothyroidism. Sometimes the thyroid gland is not functioning because of lack of stimulation from the pituitary gland.  Symptoms of hypothyroidism can include:    Fatigue    Trouble concentrating or thinking clearly; forgetfulness    Dry skin    Hair loss    Weight gain    Low tolerance to cold    Constipation    Depression    Personality changes    Tingling or prickling of the hands or feet    Heavy, absent, or irregular periods (women only)  Older adults may sometimes have other symptoms. These can include:    Muscle aches and weakness    Confusion    Incontinence (unable to control urine or stool)    Trouble moving around    Falling  Treatment for hypothyroidism involves taking thyroid hormone pills daily. These pills replace the hormone your thyroid doesn t make. You will likely need to take a daily pill for the rest of your life. Tips for taking this medicine are given below.  Home care  Tips for taking your medicine    Take your thyroid hormone pills as prescribed by your healthcare provider. This is most often 1 pill a day on an empty stomach. Use a pillbox labeled with the days of the week. This will help you remember to take your pill each day.    Don t take products that contain iron and calcium or antacids within 4 hours of taking your thyroid hormone pills.    Don t take  other medicines with your thyroid hormone pill without checking with your provider first.    Tell your provider if you have any side effects from your medicines that bother you, especially any chest pain or irregular heart beats.    Never change the dosage or stop taking your thyroid pills without talking to your provider first.  General care    Always talk with your provider before trying other medicines or treatments for your thyroid problem.    If you see other healthcare providers, be sure to let them know about your thyroid problem.    Let your healthcare provider know if you become pregnant because your dose of thyroid hormone will need to be adjusted.  Follow-up care  See your healthcare provider for checkups as advised. You may need regular tests to check the level of thyroid hormone in your blood.  When to seek medical advice  Call your healthcare provider right away if any of these occur:    New symptoms develop    Symptoms return, continue, or worsen even after treatment    Extreme fatigue    Puffy hands, face, or feet    Fast or irregular heartbeat    Confusion     Call 911  Call 911 if any of these occur:    Fainting    Chest pain    Shortness of breath or trouble breathing   Date Last Reviewed: 4/1/2018 2000-2018 [employer]. [address] [place] [address], [place] [address]. All rights reserved. This information is not intended as a substitute for professional medical care. Always follow your healthcare professional's instructions.

## 2019-03-18 NOTE — NURSING NOTE
"Chief Complaint   Patient presents with     Diabetes       Initial /75   Pulse 68   Temp 98.3  F (36.8  C) (Oral)   Ht 1.695 m (5' 6.75\")   Wt 92.1 kg (203 lb)   SpO2 96%   BMI 32.03 kg/m   Estimated body mass index is 32.03 kg/m  as calculated from the following:    Height as of this encounter: 1.695 m (5' 6.75\").    Weight as of this encounter: 92.1 kg (203 lb).  Medication Reconciliation: complete    STEPHANIE Raymundo MA    "

## 2019-05-15 DIAGNOSIS — E03.9 HYPOTHYROIDISM, UNSPECIFIED TYPE: ICD-10-CM

## 2019-05-15 LAB — TSH SERPL DL<=0.005 MIU/L-ACNC: 0.74 MU/L (ref 0.4–4)

## 2019-05-15 PROCEDURE — 84443 ASSAY THYROID STIM HORMONE: CPT | Performed by: PHYSICIAN ASSISTANT

## 2019-05-15 PROCEDURE — 36415 COLL VENOUS BLD VENIPUNCTURE: CPT | Performed by: PHYSICIAN ASSISTANT

## 2019-06-01 ENCOUNTER — TRANSFERRED RECORDS (OUTPATIENT)
Dept: MULTI SPECIALTY CLINIC | Facility: CLINIC | Age: 59
End: 2019-06-01

## 2019-06-05 ENCOUNTER — TELEPHONE (OUTPATIENT)
Dept: FAMILY MEDICINE | Facility: CLINIC | Age: 59
End: 2019-06-05

## 2019-06-05 ENCOUNTER — MYC MEDICAL ADVICE (OUTPATIENT)
Dept: PEDIATRICS | Facility: CLINIC | Age: 59
End: 2019-06-05

## 2019-09-19 ENCOUNTER — OFFICE VISIT (OUTPATIENT)
Dept: FAMILY MEDICINE | Facility: CLINIC | Age: 59
End: 2019-09-19
Payer: COMMERCIAL

## 2019-09-19 VITALS
DIASTOLIC BLOOD PRESSURE: 75 MMHG | WEIGHT: 189 LBS | OXYGEN SATURATION: 97 % | TEMPERATURE: 98.5 F | BODY MASS INDEX: 29.82 KG/M2 | SYSTOLIC BLOOD PRESSURE: 111 MMHG | HEART RATE: 69 BPM

## 2019-09-19 DIAGNOSIS — I10 BENIGN ESSENTIAL HYPERTENSION: ICD-10-CM

## 2019-09-19 DIAGNOSIS — E03.9 HYPOTHYROIDISM, UNSPECIFIED TYPE: ICD-10-CM

## 2019-09-19 DIAGNOSIS — E11.9 TYPE 2 DIABETES MELLITUS WITHOUT COMPLICATION, WITHOUT LONG-TERM CURRENT USE OF INSULIN (H): Primary | ICD-10-CM

## 2019-09-19 LAB
CREAT UR-MCNC: 52 MG/DL
HBA1C MFR BLD: 5.9 % (ref 0–5.6)
MICROALBUMIN UR-MCNC: <5 MG/L
MICROALBUMIN/CREAT UR: NORMAL MG/G CR (ref 0–25)

## 2019-09-19 PROCEDURE — 83036 HEMOGLOBIN GLYCOSYLATED A1C: CPT | Performed by: PHYSICIAN ASSISTANT

## 2019-09-19 PROCEDURE — 99214 OFFICE O/P EST MOD 30 MIN: CPT | Performed by: PHYSICIAN ASSISTANT

## 2019-09-19 PROCEDURE — 82043 UR ALBUMIN QUANTITATIVE: CPT | Performed by: PHYSICIAN ASSISTANT

## 2019-09-19 PROCEDURE — 36415 COLL VENOUS BLD VENIPUNCTURE: CPT | Performed by: PHYSICIAN ASSISTANT

## 2019-09-19 RX ORDER — HYDROCHLOROTHIAZIDE 25 MG/1
25 TABLET ORAL DAILY
Qty: 90 TABLET | Refills: 1 | Status: SHIPPED | OUTPATIENT
Start: 2019-09-19 | End: 2020-03-24

## 2019-09-19 RX ORDER — METFORMIN HCL 500 MG
TABLET, EXTENDED RELEASE 24 HR ORAL
Qty: 180 TABLET | Refills: 1 | Status: SHIPPED | OUTPATIENT
Start: 2019-09-19 | End: 2020-03-24

## 2019-09-19 RX ORDER — LISINOPRIL 10 MG/1
10 TABLET ORAL DAILY
Qty: 90 TABLET | Refills: 1 | Status: SHIPPED | OUTPATIENT
Start: 2019-09-19 | End: 2020-03-24

## 2019-09-19 RX ORDER — ATORVASTATIN CALCIUM 10 MG/1
10 TABLET, FILM COATED ORAL DAILY
Qty: 90 TABLET | Refills: 1 | Status: SHIPPED | OUTPATIENT
Start: 2019-09-19 | End: 2020-03-24

## 2019-09-19 RX ORDER — DILTIAZEM HYDROCHLORIDE 360 MG/1
CAPSULE, EXTENDED RELEASE ORAL
Qty: 90 CAPSULE | Refills: 1 | Status: SHIPPED | OUTPATIENT
Start: 2019-09-19 | End: 2020-03-24

## 2019-09-19 RX ORDER — LEVOTHYROXINE SODIUM 112 UG/1
112 TABLET ORAL DAILY
Qty: 90 TABLET | Refills: 3 | Status: SHIPPED | OUTPATIENT
Start: 2019-09-19 | End: 2020-11-24

## 2019-09-19 ASSESSMENT — PAIN SCALES - GENERAL: PAINLEVEL: NO PAIN (0)

## 2019-09-19 NOTE — NURSING NOTE
"Chief Complaint   Patient presents with     Diabetes       Initial /75   Pulse 69   Temp 98.5  F (36.9  C) (Oral)   Wt 85.7 kg (189 lb)   SpO2 97%   BMI 29.82 kg/m   Estimated body mass index is 29.82 kg/m  as calculated from the following:    Height as of 3/18/19: 1.695 m (5' 6.75\").    Weight as of this encounter: 85.7 kg (189 lb).  Medication Reconciliation: complete    STEPHANIE Raymundo MA    "

## 2019-09-19 NOTE — Clinical Note
Please abstract the following data from this visit with this patient into the appropriate field in Epic:Tests that can be patient reported without a hard copy:Eye exam with ophthalmology on this date: 06/01/19Other Tests found in the patient's chart through Chart Review/Care Everywhere:Note to Abstraction: If this section is blank, no results were found via Chart Review/Care Everywhere.

## 2019-09-19 NOTE — PATIENT INSTRUCTIONS
Results for orders placed or performed in visit on 09/19/19   Hemoglobin A1c   Result Value Ref Range    Hemoglobin A1C 5.9 (H) 0 - 5.6 %

## 2019-09-29 ENCOUNTER — HEALTH MAINTENANCE LETTER (OUTPATIENT)
Age: 59
End: 2019-09-29

## 2019-12-05 ENCOUNTER — ANCILLARY PROCEDURE (OUTPATIENT)
Dept: MAMMOGRAPHY | Facility: CLINIC | Age: 59
End: 2019-12-05
Payer: COMMERCIAL

## 2019-12-05 DIAGNOSIS — Z12.31 VISIT FOR SCREENING MAMMOGRAM: ICD-10-CM

## 2019-12-05 PROCEDURE — 77067 SCR MAMMO BI INCL CAD: CPT | Mod: TC

## 2020-01-28 ENCOUNTER — OFFICE VISIT (OUTPATIENT)
Dept: FAMILY MEDICINE | Facility: OTHER | Age: 60
End: 2020-01-28
Payer: COMMERCIAL

## 2020-01-28 VITALS
BODY MASS INDEX: 31.27 KG/M2 | RESPIRATION RATE: 16 BRPM | HEIGHT: 67 IN | DIASTOLIC BLOOD PRESSURE: 70 MMHG | WEIGHT: 199.2 LBS | HEART RATE: 90 BPM | TEMPERATURE: 98.4 F | OXYGEN SATURATION: 96 % | SYSTOLIC BLOOD PRESSURE: 112 MMHG

## 2020-01-28 DIAGNOSIS — J01.00 ACUTE NON-RECURRENT MAXILLARY SINUSITIS: Primary | ICD-10-CM

## 2020-01-28 PROCEDURE — 99213 OFFICE O/P EST LOW 20 MIN: CPT | Performed by: PHYSICIAN ASSISTANT

## 2020-01-28 RX ORDER — DOXYCYCLINE 100 MG/1
100 CAPSULE ORAL 2 TIMES DAILY
Qty: 14 CAPSULE | Refills: 0 | Status: SHIPPED | OUTPATIENT
Start: 2020-01-28 | End: 2020-03-24

## 2020-01-28 ASSESSMENT — MIFFLIN-ST. JEOR: SCORE: 1510.07

## 2020-01-28 NOTE — PROGRESS NOTES
Subjective     Wendy Dodd is a 59 year old female who presents to clinic today for the following health issues:    HPI   Acute Illness   Acute illness concerns: facial pain, possible sinus infection (she has never had one before)  Onset: Sunday for the facial pain-two days. But she could feel like something was coming on last week Thursday, 5 days ago.     Fever: no    Chills/Sweats: no    Headache (location?): YES, left sided.     Sinus Pressure:YES- facial pain and teeth pain    Conjunctivitis:  YES: right. Bilateral runny eyes.     Ear Pain: YES: left. Not much for pain.     Rhinorrhea: YES- but just at the front of her nose.     Congestion: no    Sore Throat: no     Cough: YES-non-productive    Wheeze: no    Decreased Appetite: no    Nausea: no    Vomiting: no    Diarrhea:  no    Dysuria/Freq.: YES- seems like she is going to the bathroom more frequently within the last day.     Fatigue/Achiness: no    Sick/Strep Exposure: YES- sister has a cold but she doesn't have facial pain. She was at the [place] about a week or so ago-both her and sister came down with something.      Therapies Tried and outcome: ibuprofen, netti-pot            Current Outpatient Medications   Medication Sig Dispense Refill     aspirin 81 MG tablet Take 1 tablet (81 mg) by mouth daily 30 tablet 11     atorvastatin (LIPITOR) 10 MG tablet Take 1 tablet (10 mg) by mouth daily 90 tablet 1     diltiazem ER (TIAZAC) 360 MG 24 hr ER beaded capsule TAKE ONE CAPSULE BY MOUTH ONCE DAILY 90 capsule 1     folic acid 800 MCG TABS        hydrochlorothiazide (HYDRODIURIL) 25 MG tablet Take 1 tablet (25 mg) by mouth daily 90 tablet 1     IRON PO        levothyroxine (SYNTHROID/LEVOTHROID) 112 MCG tablet Take 1 tablet (112 mcg) by mouth daily 90 tablet 3     lisinopril (PRINIVIL/ZESTRIL) 10 MG tablet Take 1 tablet (10 mg) by mouth daily 90 tablet 1     metFORMIN (GLUCOPHAGE-XR) 500 MG 24 hr tablet 2 tablets daily with evening meal 180 tablet 1      "naproxen (NAPROSYN) 500 MG tablet Take 1 tablet (500 mg) by mouth 2 times daily (with meals) 30 tablet 3     Psyllium (METAMUCIL PO)        blood glucose monitoring (ACCU-CHEK RENY PLUS) test strip Use to test blood sugar 2 times daily or as directed. (Patient not taking: Reported on 1/28/2020) 200 strip 11     blood glucose monitoring (ACCU-CHEK FASTCLIX) lancets Use to test blood sugar 2 times daily or as directed. (Patient not taking: Reported on 1/28/2020) 102 each 11     Allergies   Allergen Reactions     Penicillins      Sulfa Drugs        Reviewed and updated as needed this visit by Provider         Review of Systems   ROS COMP: Constitutional, HEENT, cardiovascular, pulmonary, gi and gu systems are negative, except as otherwise noted.      Objective    /70   Pulse 90   Temp 98.4  F (36.9  C) (Temporal)   Resp 16   Ht 1.7 m (5' 6.93\")   Wt 90.4 kg (199 lb 3.2 oz)   SpO2 96%   BMI 31.27 kg/m    Body mass index is 31.27 kg/m .  Physical Exam   GENERAL: healthy, alert and no distress  EYES: Eyes grossly normal to inspection, PERRL and conjunctivae and sclerae normal  HENT: normal cephalic/atraumatic, right ear: TM abnormality no effusion present, EAC patent, left ear: TM abnormality no effusion present, EAC patent, nasal mucosa edematous , rhinorrhea clear, oropharynx clear, oral mucous membranes moist and sinuses: not tender  NECK: no adenopathy, no asymmetry, masses, or scars and thyroid normal to palpation  RESP: lungs clear to auscultation - no rales, rhonchi or wheezes  CV: regular rate and rhythm, normal S1 S2, no S3 or S4, no murmur, click or rub, no peripheral edema and peripheral pulses strong  MS: no gross musculoskeletal defects noted, no edema    Diagnostic Test Results:  Labs reviewed in Epic        Assessment & Plan       ICD-10-CM    1. Acute non-recurrent maxillary sinusitis J01.00 doxycycline monohydrate (MONODOX) 100 MG capsule       At this time symptoms are still likely related " to viral process.  Encouraged her to use the nettipot more consistently and start nasal steroid and plain mucinex to help resolve her symptoms.  If the symptoms are not improving over the next couple of days she can pick-up and start the antibiotic, discussed potential side effects of medication.      Return in about 1 week (around 2/4/2020) for If not improving, sooner if worse or new concerns.    Options for treatment and follow-up care were reviewed with the patient and/or guardian. Patient and/or guardian engaged in the decision making process and verbalized understanding of the options discussed and agreed with the final plan.    Valarie Chaney PA-C  [facility]

## 2020-01-28 NOTE — PATIENT INSTRUCTIONS
"1. Antibiotic: If not improving in symptoms over the next couple of days start the Doxycycline. Take for a full week. Take the full course of the antibiotic.  Can consider adding in a probiotic or yogurt if GI upset is a concern. Follow-up if any problems with the antibiotic.    2. Nasal Saline: At the pharmacy you can find a \"Nettipot\" or NeilMed Nasal Rinse.  This is a salt solution that you mix with warm water.  I want you to perform nasal saline washes at least twice daily while you are sick.  You can do this anytime you feel like you are developing nasal congestion.  To properly utilize be sure you are leaning forward as far as you can and either tilt or squeeze slowly.  In the beginning this can be difficult to get to work properly but as the congestion is improved it will work easier.  If you don't use these properly you can feel as if you're drowning.     3. Nasal Steroid: Fluticasone/Flonase or Nasacort, These are OTC medications for allergies.  They are steroid sprays that are localized to the nose so no systemic effects. Two sprays each nostril once daily - allergist noted it is most effective if used before bed.  Ok to continue to use nasal saline as well.  When you spray it in the nose it should be up and out towards the eye on the side you are spraying the medication.  You should not taste the medication and it should not drip out the nose. This will decrease inflammation and also nasal mucous production.  You can also use this anytime you are developing nasal congestion.  Ok in the future to start these as soon as you feel you are developing nasal congestion, sinus pressure, increased nasal drainage.    4. IMPORTANT: When using other nasal inhaled products especially 12 hour sprays such as Afrin you should only use for a max of approximately 3 days, longer use could result in rebound congestion (congestion that develops because you're off the medication).    5.  Plain Mucinex.    6. Heat packs on " sinuses    7. Ibuprofen as needed      Follow-up if symptoms worsen or do not resolve.  Feel free to call with any questions or concerns.

## 2020-02-17 ENCOUNTER — MYC MEDICAL ADVICE (OUTPATIENT)
Dept: FAMILY MEDICINE | Facility: CLINIC | Age: 60
End: 2020-02-17

## 2020-03-10 DIAGNOSIS — I10 BENIGN ESSENTIAL HYPERTENSION: ICD-10-CM

## 2020-03-10 DIAGNOSIS — E11.9 TYPE 2 DIABETES MELLITUS WITHOUT COMPLICATION, WITHOUT LONG-TERM CURRENT USE OF INSULIN (H): ICD-10-CM

## 2020-03-10 DIAGNOSIS — E03.9 HYPOTHYROIDISM, UNSPECIFIED TYPE: ICD-10-CM

## 2020-03-10 LAB
ANION GAP SERPL CALCULATED.3IONS-SCNC: 5 MMOL/L (ref 3–14)
BUN SERPL-MCNC: 20 MG/DL (ref 7–30)
CALCIUM SERPL-MCNC: 10.4 MG/DL (ref 8.5–10.1)
CHLORIDE SERPL-SCNC: 105 MMOL/L (ref 94–109)
CHOLEST SERPL-MCNC: 156 MG/DL
CO2 SERPL-SCNC: 31 MMOL/L (ref 20–32)
CREAT SERPL-MCNC: 0.58 MG/DL (ref 0.52–1.04)
GFR SERPL CREATININE-BSD FRML MDRD: >90 ML/MIN/{1.73_M2}
GLUCOSE SERPL-MCNC: 125 MG/DL (ref 70–99)
HBA1C MFR BLD: 6.5 % (ref 0–5.6)
HDLC SERPL-MCNC: 64 MG/DL
LDLC SERPL CALC-MCNC: 74 MG/DL
NONHDLC SERPL-MCNC: 92 MG/DL
POTASSIUM SERPL-SCNC: 3.7 MMOL/L (ref 3.4–5.3)
SODIUM SERPL-SCNC: 141 MMOL/L (ref 133–144)
TRIGL SERPL-MCNC: 89 MG/DL
TSH SERPL DL<=0.005 MIU/L-ACNC: 1.25 MU/L (ref 0.4–4)

## 2020-03-10 PROCEDURE — 80048 BASIC METABOLIC PNL TOTAL CA: CPT | Performed by: PHYSICIAN ASSISTANT

## 2020-03-10 PROCEDURE — 83036 HEMOGLOBIN GLYCOSYLATED A1C: CPT | Performed by: PHYSICIAN ASSISTANT

## 2020-03-10 PROCEDURE — 84443 ASSAY THYROID STIM HORMONE: CPT | Performed by: PHYSICIAN ASSISTANT

## 2020-03-10 PROCEDURE — 36415 COLL VENOUS BLD VENIPUNCTURE: CPT | Performed by: PHYSICIAN ASSISTANT

## 2020-03-10 PROCEDURE — 80061 LIPID PANEL: CPT | Performed by: PHYSICIAN ASSISTANT

## 2020-03-24 ENCOUNTER — VIRTUAL VISIT (OUTPATIENT)
Dept: FAMILY MEDICINE | Facility: CLINIC | Age: 60
End: 2020-03-24
Payer: COMMERCIAL

## 2020-03-24 DIAGNOSIS — I10 BENIGN ESSENTIAL HYPERTENSION: ICD-10-CM

## 2020-03-24 DIAGNOSIS — E11.9 TYPE 2 DIABETES MELLITUS WITHOUT COMPLICATION, WITHOUT LONG-TERM CURRENT USE OF INSULIN (H): ICD-10-CM

## 2020-03-24 PROCEDURE — 99441 ZZC PHYSICIAN TELEPHONE EVALUATION 5-10 MIN: CPT | Performed by: PHYSICIAN ASSISTANT

## 2020-03-24 RX ORDER — HYDROCHLOROTHIAZIDE 25 MG/1
25 TABLET ORAL DAILY
Qty: 90 TABLET | Refills: 1 | Status: SHIPPED | OUTPATIENT
Start: 2020-03-24 | End: 2020-11-06

## 2020-03-24 RX ORDER — DILTIAZEM HYDROCHLORIDE 360 MG/1
CAPSULE, EXTENDED RELEASE ORAL
Qty: 90 CAPSULE | Refills: 1 | Status: SHIPPED | OUTPATIENT
Start: 2020-03-24 | End: 2020-11-06

## 2020-03-24 RX ORDER — ATORVASTATIN CALCIUM 10 MG/1
10 TABLET, FILM COATED ORAL DAILY
Qty: 90 TABLET | Refills: 1 | Status: SHIPPED | OUTPATIENT
Start: 2020-03-24 | End: 2020-11-23

## 2020-03-24 RX ORDER — METFORMIN HCL 500 MG
TABLET, EXTENDED RELEASE 24 HR ORAL
Qty: 180 TABLET | Refills: 1 | Status: SHIPPED | OUTPATIENT
Start: 2020-03-24 | End: 2020-11-23

## 2020-03-24 RX ORDER — LISINOPRIL 10 MG/1
10 TABLET ORAL DAILY
Qty: 90 TABLET | Refills: 1 | Status: SHIPPED | OUTPATIENT
Start: 2020-03-24 | End: 2020-09-30

## 2020-03-24 NOTE — PROGRESS NOTES
"Wendy Dodd is a 59 year old female who is being evaluated via a billable telephone visit.      The patient has been notified of following:     \"This telephone visit will be conducted via a call between you and your physician/provider. We have found that certain health care needs can be provided without the need for a physical exam.  This service lets us provide the care you need with a short phone conversation.  If a prescription is necessary we can send it directly to your pharmacy.  If lab work is needed we can place an order for that and you can then stop by our lab to have the test done at a later time.    If during the course of the call the physician/provider feels a telephone visit is not appropriate, you will not be charged for this service.\"     Wendy Dodd complains of    Chief Complaint   Patient presents with     Diabetes       I have reviewed and updated the patient's Past Medical History, Social History, Family History and Medication List.    ALLERGIES  Penicillins and Sulfa drugs    Diabetes Follow-up      How often are you checking your blood sugar? rare    What concerns do you have today about your diabetes? None     Do you have any of these symptoms? (Select all that apply)  No numbness or tingling in feet.  No redness, sores or blisters on feet.  No complaints of excessive thirst.  No reports of blurry vision.  No significant changes to weight.      BP Readings from Last 2 Encounters:   01/28/20 112/70   09/19/19 111/75     Hemoglobin A1C (%)   Date Value   03/10/2020 6.5 (H)   09/19/2019 5.9 (H)     LDL Cholesterol Calculated (mg/dL)   Date Value   03/10/2020 74   03/04/2019 52         Additional provider notes: none, she is working from home and less active than usual. No concerns with her medications.     Assessment/Plan:  1. Type 2 diabetes mellitus without complication, without long-term current use of insulin (H)  Stable, she was able to get her lab tests done prior to " COVID19 restrictions and all look good.   Refill of her medications given x  6 months.   She plans to work on making lifestyle changes within the next 6 months with diet and exercise.   - atorvastatin (LIPITOR) 10 MG tablet; Take 1 tablet (10 mg) by mouth daily  Dispense: 90 tablet; Refill: 1  - metFORMIN (GLUCOPHAGE-XR) 500 MG 24 hr tablet; 2 tablets daily with evening meal  Dispense: 180 tablet; Refill: 1    2. Benign essential hypertension  Stable, continue home readings.   Kidney function and potassium are normal.   Recheck in 6 months.   - hydrochlorothiazide (HYDRODIURIL) 25 MG tablet; Take 1 tablet (25 mg) by mouth daily  Dispense: 90 tablet; Refill: 1  - diltiazem ER (TIAZAC) 360 MG 24 hr ER beaded capsule; TAKE ONE CAPSULE BY MOUTH ONCE DAILY  Dispense: 90 capsule; Refill: 1  - lisinopril (ZESTRIL) 10 MG tablet; Take 1 tablet (10 mg) by mouth daily  Dispense: 90 tablet; Refill: 1    Phone call duration:  10 minutes    Kristen M. Kehr, PA-C

## 2020-04-20 ENCOUNTER — VIRTUAL VISIT (OUTPATIENT)
Dept: FAMILY MEDICINE | Facility: CLINIC | Age: 60
End: 2020-04-20
Payer: COMMERCIAL

## 2020-04-20 DIAGNOSIS — R21 RASH: Primary | ICD-10-CM

## 2020-04-20 PROCEDURE — 99213 OFFICE O/P EST LOW 20 MIN: CPT | Mod: GT | Performed by: FAMILY MEDICINE

## 2020-04-20 RX ORDER — CETIRIZINE HYDROCHLORIDE 10 MG/1
10 TABLET ORAL DAILY
Qty: 30 TABLET | Refills: 1 | Status: SHIPPED | OUTPATIENT
Start: 2020-04-20 | End: 2022-04-06

## 2020-04-20 RX ORDER — VALACYCLOVIR HYDROCHLORIDE 1 G/1
1000 TABLET, FILM COATED ORAL 3 TIMES DAILY
Qty: 21 TABLET | Refills: 0 | Status: SHIPPED | OUTPATIENT
Start: 2020-04-20 | End: 2020-11-24

## 2020-04-20 RX ORDER — TRIAMCINOLONE ACETONIDE 1 MG/G
CREAM TOPICAL 2 TIMES DAILY PRN
Qty: 45 G | Refills: 0 | Status: SHIPPED | OUTPATIENT
Start: 2020-04-20 | End: 2020-11-24

## 2020-04-20 NOTE — PROGRESS NOTES
"Wendy Dodd is a 59 year old female who is being evaluated via a billable video visit.    Rash neck/chin/upper chest x1 week. No fevers.   Mildly puritic. No similar issues in past. No rash contacts.   Not as red but spreading. No other major symptoms. No nausea, vomiting or diarrhea. No shortness of breath. No cold symptoms. No history poison ivy. Not out if works. Cat indoor. Tried nystatin and took some benadryl.   Only on Left side. Not into hand. No weakness.     The patient has been notified of following:     \"This video visit will be conducted via a call between you and your physician/provider. We have found that certain health care needs can be provided without the need for an in-person physical exam.  This service lets us provide the care you need with a video conversation.  If a prescription is necessary we can send it directly to your pharmacy.  If lab work is needed we can place an order for that and you can then stop by our lab to have the test done at a later time.    Video visits are billed at different rates depending on your insurance coverage.  Please reach out to your insurance provider with any questions.    If during the course of the call the physician/provider feels a video visit is not appropriate, you will not be charged for this service.\"    Patient has given verbal consent for Video visit? Yes        Patient would like the video invitation sent by: Send to e-mail at: [email or web address]    Subjective     Wendy Dodd is a 59 year old female who presents to clinic today for the following health issues:    Rash  X 1 week        Video Start Time: 10:23    PROBLEMS TO ADD ON...    Patient Active Problem List   Diagnosis     Advanced directives, counseling/discussion     Diverticulosis of large intestine     External hemorrhoids     History of colonic polyps     Benign essential hypertension     Hypothyroidism, unspecified type     Non morbid obesity due to excess " "calories     Type 2 diabetes mellitus without complication, without long-term current use of insulin (H)     Esophageal reflux     Past Surgical History:   Procedure Laterality Date     ABDOMEN SURGERY       APPENDECTOMY       BIOPSY       COLONOSCOPY       ENT SURGERY         Social History     Tobacco Use     Smoking status: Former Smoker     Types: Cigarettes, Other     Start date: 1975     Last attempt to quit: 2015     Years since quittin.3     Smokeless tobacco: Never Used     Tobacco comment: I use Ecig   Substance Use Topics     Alcohol use: Yes     Alcohol/week: 0.0 standard drinks     Family History   Problem Relation Age of Onset     Colon Cancer Father      Other Cancer Father         liver     Other Cancer Mother      Other Cancer Brother      Skin Cancer Sister            Reviewed and updated as needed this visit by Provider         Review of Systems   All other ROS negative.       Objective    There were no vitals taken for this visit.  Estimated body mass index is 31.27 kg/m  as calculated from the following:    Height as of 20: 1.7 m (5' 6.93\").    Weight as of 20: 90.4 kg (199 lb 3.2 oz).  Physical Exam   GENERAL: healthy, alert and no distress  EYES: Eyes grossly normal to inspection, PERRL and conjunctivae and sclerae normal  SKIN: red macular rash with some vesicles base on neck and upper left chest into left shoulder.   PSYCH: mentation appears normal, affect normal/bright    Diagnostic Test Results:  Labs reviewed in Epic         ASSESSMENT / PLAN:  (R21) Rash  (primary encounter diagnosis)  Comment: shingles vs dermatitis  Plan: valACYclovir (VALTREX) 1000 mg tablet,         triamcinolone (KENALOG) 0.1 % external cream,         cetirizine (ZYRTEC) 10 MG tablet        Reveiwed risks and side effects of medication  Limit shower time/soap and hot water to area. Avoid scratching. Expected course and warning signs reviewed. Call/email with questions/concerns  Let us know asap " "if other symptoms like fevers or chills/ worsening rash/pus/body aches/etc or issues with medication. zytrec in AM and benadryl at bedtime.     Consider prednisone if spreading or derm/allergist input.      BMI:   Estimated body mass index is 31.27 kg/m  as calculated from the following:    Height as of 1/28/20: 1.7 m (5' 6.93\").    Weight as of 1/28/20: 90.4 kg (199 lb 3.2 oz).           MEDICATIONS:  Continue current medications without change      Seferino Iverson MD  [facility]      Video-Visit Details    Type of service:  Video Visit    Video End Time (time video stopped): 10:34    Originating Location (pt. Location): Home    Distant Location (provider location):  [facility]     Mode of Communication:  Video Conference via [employer]    No follow-ups on file.       Seferino Iverson MD      "

## 2020-05-13 ENCOUNTER — TELEPHONE (OUTPATIENT)
Dept: BEHAVIORAL HEALTH | Facility: CLINIC | Age: 60
End: 2020-05-13

## 2020-05-13 SDOH — SOCIAL STABILITY: SOCIAL INSECURITY: WITHIN THE LAST YEAR, HAVE YOU BEEN AFRAID OF YOUR PARTNER OR EX-PARTNER?: NO

## 2020-05-13 SDOH — SOCIAL STABILITY: SOCIAL NETWORK
IN A TYPICAL WEEK, HOW MANY TIMES DO YOU TALK ON THE PHONE WITH FAMILY, FRIENDS, OR NEIGHBORS?: MORE THAN THREE TIMES A WEEK

## 2020-05-13 SDOH — HEALTH STABILITY: MENTAL HEALTH
STRESS IS WHEN SOMEONE FEELS TENSE, NERVOUS, ANXIOUS, OR CAN'T SLEEP AT NIGHT BECAUSE THEIR MIND IS TROUBLED. HOW STRESSED ARE YOU?: ONLY A LITTLE

## 2020-05-13 SDOH — ECONOMIC STABILITY: TRANSPORTATION INSECURITY
IN THE PAST 12 MONTHS, HAS THE LACK OF TRANSPORTATION KEPT YOU FROM MEDICAL APPOINTMENTS OR FROM GETTING MEDICATIONS?: NO

## 2020-05-13 SDOH — ECONOMIC STABILITY: INCOME INSECURITY: HOW HARD IS IT FOR YOU TO PAY FOR THE VERY BASICS LIKE FOOD, HOUSING, MEDICAL CARE, AND HEATING?: NOT HARD AT ALL

## 2020-05-13 SDOH — ECONOMIC STABILITY: FOOD INSECURITY: WITHIN THE PAST 12 MONTHS, YOU WORRIED THAT YOUR FOOD WOULD RUN OUT BEFORE YOU GOT MONEY TO BUY MORE.: NEVER TRUE

## 2020-05-13 SDOH — ECONOMIC STABILITY: TRANSPORTATION INSECURITY
IN THE PAST 12 MONTHS, HAS LACK OF TRANSPORTATION KEPT YOU FROM MEETINGS, WORK, OR FROM GETTING THINGS NEEDED FOR DAILY LIVING?: NO

## 2020-05-13 SDOH — ECONOMIC STABILITY: FOOD INSECURITY: WITHIN THE PAST 12 MONTHS, THE FOOD YOU BOUGHT JUST DIDN'T LAST AND YOU DIDN'T HAVE MONEY TO GET MORE.: NEVER TRUE

## 2020-05-13 NOTE — TELEPHONE ENCOUNTER
Community Paramedic Social Needs Assessment    Spoke with: Patient      Refer also to Social Determinants of Health tab for information obtained    1. Have you or any family members you live with been unable to meet basic needs of food, clothing, medication, utilities, , health care needs, other: No    2. How do you get to and from appointments/work/groceries, household needs? Personal vehicle.     3. What is your housing situation? Living in own home.     4. How many people do you currently live with? 1 other, .     5. Are you currently employed? Yes    6. Does the patient feel physically and emotionally safe where they currently live?  Yes    7. Are you experiencing increased, disruptive stress around Covid-19?  Yes, just some stress due to learning how to manage working from home and the major work modifications she has had to adjust to.     8. How often do you speak with those that care about you or you feel close to? Daily.      Concerns/Barriers Noted During Call: None. The patient was seen by her PCP in March and is not due for any check ups at this time. She identified her and her  are doing well during this time.    Resources Given: Recommended pt use [employer] to reach out to her PCP or specialist with any health care needs and for up to date information on COVID-19 and resources.    Referrals: None.

## 2020-09-30 DIAGNOSIS — I10 BENIGN ESSENTIAL HYPERTENSION: ICD-10-CM

## 2020-09-30 RX ORDER — LISINOPRIL 10 MG/1
TABLET ORAL
Qty: 90 TABLET | Refills: 1 | Status: SHIPPED | OUTPATIENT
Start: 2020-09-30 | End: 2020-11-24

## 2020-10-12 ENCOUNTER — MYC MEDICAL ADVICE (OUTPATIENT)
Dept: FAMILY MEDICINE | Facility: CLINIC | Age: 60
End: 2020-10-12

## 2020-11-06 DIAGNOSIS — I10 BENIGN ESSENTIAL HYPERTENSION: ICD-10-CM

## 2020-11-06 RX ORDER — DILTIAZEM HYDROCHLORIDE 360 MG/1
CAPSULE, EXTENDED RELEASE ORAL
Qty: 90 CAPSULE | Refills: 0 | Status: SHIPPED | OUTPATIENT
Start: 2020-11-06 | End: 2020-11-24

## 2020-11-06 RX ORDER — HYDROCHLOROTHIAZIDE 25 MG/1
TABLET ORAL
Qty: 90 TABLET | Refills: 0 | Status: SHIPPED | OUTPATIENT
Start: 2020-11-06 | End: 2020-11-24

## 2020-11-06 NOTE — TELEPHONE ENCOUNTER
Routing refill request to provider for review/approval because:  Labs not current:  ALT  Concha Martin BSN, RN

## 2020-11-06 NOTE — LETTER
November 6, 2020    Wendy Dodd  [address]   [place] [address]    Dear Wendy,       We recently received a refill request for hydrochlorothiazide (HYDRODIURIL) & diltiazem ER (TIAZAC).  We have refilled this for a one time 90 day supply only because you are due for a:    Hypertension office visit for FURTHER REFILLS.      Please call at your earliest convenience so that there will not be a delay with your future refills.          Thank you,   Your [facility] Team/[facility]  [phone]

## 2020-11-06 NOTE — TELEPHONE ENCOUNTER
Refill x 1   Please send a letter that she needs to be seen for further refills.   Kristen Kehr PA-C

## 2020-11-24 ENCOUNTER — VIRTUAL VISIT (OUTPATIENT)
Dept: FAMILY MEDICINE | Facility: CLINIC | Age: 60
End: 2020-11-24
Payer: COMMERCIAL

## 2020-11-24 DIAGNOSIS — E11.9 TYPE 2 DIABETES MELLITUS WITHOUT COMPLICATION, WITHOUT LONG-TERM CURRENT USE OF INSULIN (H): Primary | ICD-10-CM

## 2020-11-24 DIAGNOSIS — E03.9 HYPOTHYROIDISM, UNSPECIFIED TYPE: ICD-10-CM

## 2020-11-24 DIAGNOSIS — Z12.31 VISIT FOR SCREENING MAMMOGRAM: ICD-10-CM

## 2020-11-24 DIAGNOSIS — Z12.11 SPECIAL SCREENING FOR MALIGNANT NEOPLASMS, COLON: ICD-10-CM

## 2020-11-24 DIAGNOSIS — I10 BENIGN ESSENTIAL HYPERTENSION: ICD-10-CM

## 2020-11-24 PROCEDURE — 99214 OFFICE O/P EST MOD 30 MIN: CPT | Mod: 95 | Performed by: PHYSICIAN ASSISTANT

## 2020-11-24 RX ORDER — HYDROCHLOROTHIAZIDE 25 MG/1
25 TABLET ORAL DAILY
Qty: 90 TABLET | Refills: 1 | Status: SHIPPED | OUTPATIENT
Start: 2020-11-24 | End: 2021-04-08

## 2020-11-24 RX ORDER — ATORVASTATIN CALCIUM 10 MG/1
10 TABLET, FILM COATED ORAL DAILY
Qty: 90 TABLET | Refills: 1 | Status: SHIPPED | OUTPATIENT
Start: 2020-11-24 | End: 2021-03-10

## 2020-11-24 RX ORDER — DILTIAZEM HYDROCHLORIDE 360 MG/1
CAPSULE, EXTENDED RELEASE ORAL
Qty: 90 CAPSULE | Refills: 1 | Status: SHIPPED | OUTPATIENT
Start: 2020-11-24 | End: 2021-04-08

## 2020-11-24 RX ORDER — LISINOPRIL 10 MG/1
10 TABLET ORAL DAILY
Qty: 90 TABLET | Refills: 1 | Status: SHIPPED | OUTPATIENT
Start: 2020-11-24 | End: 2021-04-08

## 2020-11-24 RX ORDER — METFORMIN HCL 500 MG
TABLET, EXTENDED RELEASE 24 HR ORAL
Qty: 180 TABLET | Refills: 1 | Status: SHIPPED | OUTPATIENT
Start: 2020-11-24 | End: 2021-02-23

## 2020-11-24 RX ORDER — LEVOTHYROXINE SODIUM 112 UG/1
112 TABLET ORAL DAILY
Qty: 90 TABLET | Refills: 3 | Status: SHIPPED | OUTPATIENT
Start: 2020-11-24 | End: 2021-04-08

## 2020-11-24 NOTE — PROGRESS NOTES
"Wendy Dodd is a 60 year old female who is being evaluated via a billable video visit.      The patient has been notified of following:     \"This video visit will be conducted via a call between you and your physician/provider. We have found that certain health care needs can be provided without the need for an in-person physical exam.  This service lets us provide the care you need with a video conversation.  If a prescription is necessary we can send it directly to your pharmacy.  If lab work is needed we can place an order for that and you can then stop by our lab to have the test done at a later time.    Video visits are billed at different rates depending on your insurance coverage.  Please reach out to your insurance provider with any questions.    If during the course of the call the physician/provider feels a video visit is not appropriate, you will not be charged for this service.\"    Patient has given verbal consent for Video visit? Yes  How would you like to obtain your AVS?   If you are dropped from the video visit, the video invite should be resent to: Send to e-mail at: [email or web address]  Will anyone else be joining your video visit? No      Subjective     Wendy Dodd is a 60 year old female who presents today via video visit for the following health issues:    HPI     Hypertension Follow-up      Do you check your blood pressure regularly outside of the clinic? No     Are you following a low salt diet? Yes    Are your blood pressures ever more than 140 on the top number (systolic) OR more   than 90 on the bottom number (diastolic), for example 140/90?       How many days per week do you miss taking your medication? 0         Video Start Time: 10:43 AM    Diabetes Follow-up      How often are you checking your blood sugar? Not at all    What concerns do you have today about your diabetes? None     Do you have any of these symptoms? (Select all that apply)  No numbness or " tingling in feet.  No redness, sores or blisters on feet.  No complaints of excessive thirst.  No reports of blurry vision.  No significant changes to weight.    Have you had a diabetic eye exam in the last 12 months? No        BP Readings from Last 2 Encounters:   01/28/20 112/70   09/19/19 111/75     Hemoglobin A1C (%)   Date Value   03/10/2020 6.5 (H)   09/19/2019 5.9 (H)     LDL Cholesterol Calculated (mg/dL)   Date Value   03/10/2020 74   03/04/2019 52                 Review of Systems   Constitutional, HEENT, cardiovascular, pulmonary, GI, , musculoskeletal, neuro, skin, endocrine and psych systems are negative, except as otherwise noted.      Objective           Vitals:  No vitals were obtained today due to virtual visit.    Physical Exam     GENERAL: Healthy, alert and no distress  EYES: Eyes grossly normal to inspection.  No discharge or erythema, or obvious scleral/conjunctival abnormalities.  RESP: No audible wheeze, cough, or visible cyanosis.  No visible retractions or increased work of breathing.    SKIN: Visible skin clear. No significant rash, abnormal pigmentation or lesions.  NEURO: Cranial nerves grossly intact.  Mentation and speech appropriate for age.  PSYCH: Mentation appears normal, affect normal/bright, judgement and insight intact, normal speech and appearance well-groomed.      Orders Only on 03/10/2020   Component Date Value Ref Range Status     Hemoglobin A1C 03/10/2020 6.5* 0 - 5.6 % Final    Comment: Normal <5.7% Prediabetes 5.7-6.4%  Diabetes 6.5% or higher - adopted from ADA   consensus guidelines.       TSH 03/10/2020 1.25  0.40 - 4.00 mU/L Final     Sodium 03/10/2020 141  133 - 144 mmol/L Final     Potassium 03/10/2020 3.7  3.4 - 5.3 mmol/L Final     Chloride 03/10/2020 105  94 - 109 mmol/L Final     Carbon Dioxide 03/10/2020 31  20 - 32 mmol/L Final     Anion Gap 03/10/2020 5  3 - 14 mmol/L Final     Glucose 03/10/2020 125* 70 - 99 mg/dL Final    Fasting specimen     Urea  Nitrogen 03/10/2020 20  7 - 30 mg/dL Final     Creatinine 03/10/2020 0.58  0.52 - 1.04 mg/dL Final     GFR Estimate 03/10/2020 >90  >60 mL/min/[1.73_m2] Final    Comment: Non  GFR Calc  Starting 12/18/2018, serum creatinine based estimated GFR (eGFR) will be   calculated using the Chronic Kidney Disease Epidemiology Collaboration   (CKD-EPI) equation.       GFR Estimate If Black 03/10/2020 >90  >60 mL/min/[1.73_m2] Final    Comment:  GFR Calc  Starting 12/18/2018, serum creatinine based estimated GFR (eGFR) will be   calculated using the Chronic Kidney Disease Epidemiology Collaboration   (CKD-EPI) equation.       Calcium 03/10/2020 10.4* 8.5 - 10.1 mg/dL Final     Cholesterol 03/10/2020 156  <200 mg/dL Final     Triglycerides 03/10/2020 89  <150 mg/dL Final    Fasting specimen     HDL Cholesterol 03/10/2020 64  >49 mg/dL Final     LDL Cholesterol Calculated 03/10/2020 74  <100 mg/dL Final    Desirable:       <100 mg/dl     Non HDL Cholesterol 03/10/2020 92  <130 mg/dL Final           Assessment & Plan     Type 2 diabetes mellitus without complication, without long-term current use of insulin (H)  Refills of her medications given.   She has gained 10-15 pounds and is not checking her blood sugars.   She will make a lab only appointment within the next week to have her A1C and testing completed. If medication needs to be adjusted, I will contact her via [employer] or telephone call. Encouraged to make lifestyle changes. COVID19 has affected her schedule with work and exercise.   - atorvastatin (LIPITOR) 10 MG tablet; Take 1 tablet (10 mg) by mouth daily  - metFORMIN (GLUCOPHAGE-XR) 500 MG 24 hr tablet; TAKE TWO TABLETS BY MOUTH EVERY DAY WITH EVENING MEAL  - **Basic metabolic panel FUTURE anytime; Future  - **A1C FUTURE anytime; Future  - Albumin Random Urine Quantitative with Creat Ratio; Future    Benign essential hypertension  Refills given.   Plan to check blood pressure outside of  "the clinic if possible.   - diltiazem ER (TIAZAC) 360 MG 24 hr ER beaded capsule; TAKE ONE CAPSULE BY MOUTH ONCE DAILY  - hydrochlorothiazide (HYDRODIURIL) 25 MG tablet; Take 1 tablet (25 mg) by mouth daily  - lisinopril (ZESTRIL) 10 MG tablet; Take 1 tablet (10 mg) by mouth daily    Hypothyroidism, unspecified type  Thyroid testing normal within the past year.   - levothyroxine (SYNTHROID/LEVOTHROID) 112 MCG tablet; Take 1 tablet (112 mcg) by mouth daily    Visit for screening mammogram  She will schedule online via [employer].   Other health maintenance testing / screening tests reviewed also.   Colonoscopy ordered.   - *MA Screening Digital Bilateral; Future     BMI:   Estimated body mass index is 31.27 kg/m  as calculated from the following:    Height as of 1/28/20: 1.7 m (5' 6.93\").    Weight as of 1/28/20: 90.4 kg (199 lb 3.2 oz).   Weight management plan: Discussed healthy diet and exercise guidelines             Return in about 1 month (around 12/24/2020) for Lab Work / routine visit in 2-3 months. .    Kristen M. Kehr, PA-C  [facility]      Video-Visit Details    Type of service:  Video Visit    Video End Time:11:02 AM    Originating Location (pt. Location): work    Distant Location (provider location):  [facility]     Platform used for Video Visit: Nanci          "

## 2020-11-24 NOTE — PATIENT INSTRUCTIONS
Make a lab only (nonfasting) lab appointment to have a urine test and diabetes testing within the next few weeks.     Refills of your medications have been sent to the pharmacy for 6 months.     Plan for a routine visit to update health maintenance screening within the next few months.

## 2020-11-30 ENCOUNTER — HOSPITAL ENCOUNTER (OUTPATIENT)
Facility: AMBULATORY SURGERY CENTER | Age: 60
End: 2020-11-30
Attending: SURGERY | Admitting: SURGERY
Payer: COMMERCIAL

## 2020-11-30 DIAGNOSIS — Z12.11 SPECIAL SCREENING FOR MALIGNANT NEOPLASMS, COLON: Primary | ICD-10-CM

## 2020-12-03 DIAGNOSIS — Z11.59 ENCOUNTER FOR SCREENING FOR OTHER VIRAL DISEASES: Primary | ICD-10-CM

## 2020-12-07 DIAGNOSIS — E11.9 TYPE 2 DIABETES MELLITUS WITHOUT COMPLICATION, WITHOUT LONG-TERM CURRENT USE OF INSULIN (H): ICD-10-CM

## 2020-12-07 LAB
ANION GAP SERPL CALCULATED.3IONS-SCNC: 3 MMOL/L (ref 3–14)
BUN SERPL-MCNC: 18 MG/DL (ref 7–30)
CALCIUM SERPL-MCNC: 10.4 MG/DL (ref 8.5–10.1)
CHLORIDE SERPL-SCNC: 103 MMOL/L (ref 94–109)
CO2 SERPL-SCNC: 32 MMOL/L (ref 20–32)
CREAT SERPL-MCNC: 0.7 MG/DL (ref 0.52–1.04)
CREAT UR-MCNC: 88 MG/DL
GFR SERPL CREATININE-BSD FRML MDRD: >90 ML/MIN/{1.73_M2}
GLUCOSE SERPL-MCNC: 120 MG/DL (ref 70–99)
HBA1C MFR BLD: 7.1 % (ref 0–5.6)
MICROALBUMIN UR-MCNC: 9 MG/L
MICROALBUMIN/CREAT UR: 10.19 MG/G CR (ref 0–25)
POTASSIUM SERPL-SCNC: 4.6 MMOL/L (ref 3.4–5.3)
SODIUM SERPL-SCNC: 138 MMOL/L (ref 133–144)

## 2020-12-07 PROCEDURE — 82043 UR ALBUMIN QUANTITATIVE: CPT | Performed by: PHYSICIAN ASSISTANT

## 2020-12-07 PROCEDURE — 83036 HEMOGLOBIN GLYCOSYLATED A1C: CPT | Performed by: PHYSICIAN ASSISTANT

## 2020-12-07 PROCEDURE — 36415 COLL VENOUS BLD VENIPUNCTURE: CPT | Performed by: PHYSICIAN ASSISTANT

## 2020-12-07 PROCEDURE — 80048 BASIC METABOLIC PNL TOTAL CA: CPT | Performed by: PHYSICIAN ASSISTANT

## 2021-01-08 RX ORDER — SODIUM, POTASSIUM,MAG SULFATES 17.5-3.13G
1 SOLUTION, RECONSTITUTED, ORAL ORAL SEE ADMIN INSTRUCTIONS
Qty: 2 BOTTLE | Refills: 0 | Status: SHIPPED | OUTPATIENT
Start: 2021-01-08 | End: 2021-01-13 | Stop reason: HOSPADM

## 2021-01-08 RX ORDER — BISACODYL 5 MG
5 TABLET, DELAYED RELEASE (ENTERIC COATED) ORAL SEE ADMIN INSTRUCTIONS
Qty: 1 TABLET | Refills: 0 | Status: SHIPPED | OUTPATIENT
Start: 2021-01-08 | End: 2021-01-13 | Stop reason: HOSPADM

## 2021-01-11 DIAGNOSIS — Z11.59 ENCOUNTER FOR SCREENING FOR OTHER VIRAL DISEASES: ICD-10-CM

## 2021-01-11 PROCEDURE — U0005 INFEC AGEN DETEC AMPLI PROBE: HCPCS | Performed by: SURGERY

## 2021-01-11 PROCEDURE — U0003 INFECTIOUS AGENT DETECTION BY NUCLEIC ACID (DNA OR RNA); SEVERE ACUTE RESPIRATORY SYNDROME CORONAVIRUS 2 (SARS-COV-2) (CORONAVIRUS DISEASE [COVID-19]), AMPLIFIED PROBE TECHNIQUE, MAKING USE OF HIGH THROUGHPUT TECHNOLOGIES AS DESCRIBED BY CMS-2020-01-R: HCPCS | Performed by: SURGERY

## 2021-01-11 RX ORDER — LIDOCAINE 40 MG/G
CREAM TOPICAL
Status: CANCELLED | OUTPATIENT
Start: 2021-01-11

## 2021-01-11 RX ORDER — ONDANSETRON 2 MG/ML
4 INJECTION INTRAMUSCULAR; INTRAVENOUS
Status: CANCELLED | OUTPATIENT
Start: 2021-01-11

## 2021-01-12 ENCOUNTER — TELEPHONE (OUTPATIENT)
Dept: LAB | Facility: CLINIC | Age: 61
End: 2021-01-12

## 2021-01-12 LAB
SARS-COV-2 RNA RESP QL NAA+PROBE: ABNORMAL
SPECIMEN SOURCE: ABNORMAL

## 2021-01-13 NOTE — TELEPHONE ENCOUNTER
"Coronavirus (COVID-19) Notification    Caller Name (Patient, parent, daughter/son, grandparent, etc)  Wendy, patient    Reason for call  Notify of Positive Coronavirus (COVID-19) lab results, assess symptoms,  review  [employer] recommendations    Lab Result    Lab test:  2019-nCoV rRt-PCR or SARS-CoV-2 PCR    Oropharyngeal AND/OR nasopharyngeal swabs is POSITIVE for 2019-nCoV RNA/SARS-COV-2 PCR (COVID-19 virus)    RN Recommendations/Instructions per [facility] Coronavirus COVID-19 recommendations    Brief introduction script  Introduce self then review script:  \"I am calling on behalf of [employer].  We were notified that your Coronavirus test (COVID-19) for was POSITIVE for the virus.  I have some information to relay to you but first I wanted to mention that the [place] Dept of Health will be contacting you shortly [it's possible MD already called Patient] to talk to you more about how you are feeling and other people you have had contact with who might now also have the virus.  Also,  [employer] is Partnering with the [facility] for Covid-19 research, you may be contacted directly by research staff.\"    Assessment (Inquire about Patient's current symptoms)   Assessment   Current Symptoms at time of phone call: (if no symptoms, document No symptoms] Loss of smell and taste.   Symptoms onset (if applicable) 10/25/2021.  Patient had a COVID19 test at that time but tested negative.  Patient was tested at a Pharmacy.     If at time of call, Patients symptoms hare worsened, the Patient should contact 911 or have someone drive them to Emergency Dept promptly:      If Patient calling 911, inform 911 personal that you have tested positive for the Coronavirus (COVID-19).  Place mask on and await 911 to arrive.    If Emergency Dept, If possible, please have another adult drive you to the Emergency Dept but you need to wear mask when in contact with other people.      Monoclonal Antibody " "Administration    You may be eligible to receive a new treatment with a monoclonal antibody for preventing hospitalization in patients at high risk for complications from COVID-19.   This medication is still experimental and available on a limited basis; it is given through an IV and must be given at an infusion center. Please note that not all people who are eligible will receive the medication since it is in limited supply.     Are you interested in being considered for this medication?  No.   Does the patient fit the criteria: Patient declined    If patient qualifies based on above criteria:  \"We will contact you if you are selected to receive the medication in the next 1-2 days.   This is time sensitive and if you are not selected in the next 1-2 days, you will not receive the medication.  If you do not receive a call to schedule, you have not been selected.\"    Review information with Patient    Your result was positive. This means you have COVID-19 (coronavirus).  We have sent you a letter that reviews the information that I'll be reviewing with you now.    How can I protect others?    If you have symptoms: stay home and away from others (self-isolate) until:    You've had no fever--and no medicine that reduces fever--for 1 full day (24 hours). And       Your other symptoms have gotten better. For example, your cough or breathing has improved. And     At least 10 days have passed since your symptoms started. (If you've been told by a doctor that you have a weak immune system, wait 20 days.)     If you don't have symptoms: Stay home and away from others (self-isolate) until at least 10 days have passed since your first positive COVID-19 test. (Date test collected)    During this time:    Stay in your own room, including for meals. Use your own bathroom if you can.    Stay away from others in your home. No hugging, kissing or shaking hands. No visitors.     Don't go to work, school or anywhere else.     Clean "  high touch  surfaces often (doorknobs, counters, handles, etc.). Use a household cleaning spray or wipes. You'll find a full list on the EPA website at www.epa.gov/pesticide-registration/list-n-disinfectants-use-against-sars-cov-2.     Cover your mouth and nose with a mask, tissue or other face covering to avoid spreading germs.    Wash your hands and face often with soap and water.    Caregivers in these groups are at risk for severe illness due to COVID-19:  o People 65 years and older  o People who live in a nursing home or long-term care facility  o People with chronic disease (lung, heart, cancer, diabetes, kidney, liver, immunologic)  o People who have a weakened immune system, including those who:  - Are in cancer treatment  - Take medicine that weakens the immune system, such as corticosteroids  - Had a bone marrow or organ transplant  - Have an immune deficiency  - Have poorly controlled HIV or AIDS  - Are obese (body mass index of 40 or higher)  - Smoke regularly    Caregivers should wear gloves while washing dishes, handling laundry and cleaning bedrooms and bathrooms.    Wash and dry laundry with special caution. Don't shake dirty laundry, and use the warmest water setting you can.    If you have a weakened immune system, ask your doctor about other actions you should take.    For more tips, go to www.cdc.gov/coronavirus/2019-ncov/downloads/10Things.pdf.    You should not go back to work until you meet the guidelines above for ending your home isolation. You don't need to be retested for COVID-19 before going back to work--studies show that you won't spread the virus if it's been at least 10 days since your symptoms started (or 20 days, if you have a weak immune system).    Employers: This document serves as formal notice of your employee's medical guidelines for going back to work. They must meet the above guidelines before going back to work in person.    How can I take care of myself?    1. Get lots  of rest. Drink extra fluids (unless a doctor has told you not to).    2. Take Tylenol (acetaminophen) for fever or pain. If you have liver or kidney problems, ask your family doctor if it's okay to take Tylenol.     Take either:     650 mg (two 325 mg pills) every 4 to 6 hours, or     1,000 mg (two 500 mg pills) every 8 hours as needed.     Note: Don't take more than 3,000 mg in one day. Acetaminophen is found in many medicines (both prescribed and over-the-counter medicines). Read all labels to be sure you don't take too much.    For children, check the Tylenol bottle for the right dose (based on their age or weight).    3. If you have other health problems (like cancer, heart failure, an organ transplant or severe kidney disease): Call your specialty clinic if you don't feel better in the next 2 days.    4. Know when to call 911: Emergency warning signs include:    Trouble breathing or shortness of breath    Pain or pressure in the chest that doesn't go away    Feeling confused like you haven't felt before, or not being able to wake up    Bluish-colored lips or face    5. Sign up for [employer]. We know it's scary to hear that you have COVID-19. We want to track your symptoms to make sure you're okay over the next 2 weeks. Please look for an email from [employer]--this is a free, online program that we'll use to keep in touch. To sign up, follow the link in the email. Learn more at [email or web address].    Where can I get more information?    [facility]: [email or web address]    Coronavirus Basics: www.health.[facility].[place]/diseases/coronavirus/basics.html    What to Do If You're Sick: www.cdc.gov/coronavirus/2019-ncov/about/steps-when-sick.html    Ending Home Isolation: www.cdc.gov/coronavirus/2019-ncov/hcp/disposition-in-home-patients.html     Caring for Someone with COVID-19: www.cdc.gov/coronavirus/2019-ncov/if-you-are-sick/care-for-someone.html     [facility] clinical  trials (COVID-19 research studies): [email or web address]     A Positive COVID-19 letter will be sent via [employer] or the mail. (Exception, no letters sent to Presurgerical/Preprocedure Patients)    Michelle Rm LPN

## 2021-01-14 ENCOUNTER — HEALTH MAINTENANCE LETTER (OUTPATIENT)
Age: 61
End: 2021-01-14

## 2021-02-23 DIAGNOSIS — E11.9 TYPE 2 DIABETES MELLITUS WITHOUT COMPLICATION, WITHOUT LONG-TERM CURRENT USE OF INSULIN (H): ICD-10-CM

## 2021-02-23 RX ORDER — METFORMIN HCL 500 MG
TABLET, EXTENDED RELEASE 24 HR ORAL
Qty: 180 TABLET | Refills: 0 | Status: SHIPPED | OUTPATIENT
Start: 2021-02-23 | End: 2021-04-07

## 2021-03-08 ENCOUNTER — MYC MEDICAL ADVICE (OUTPATIENT)
Dept: FAMILY MEDICINE | Facility: CLINIC | Age: 61
End: 2021-03-08

## 2021-03-11 ENCOUNTER — TRANSFERRED RECORDS (OUTPATIENT)
Dept: HEALTH INFORMATION MANAGEMENT | Facility: CLINIC | Age: 61
End: 2021-03-11

## 2021-03-14 ENCOUNTER — HEALTH MAINTENANCE LETTER (OUTPATIENT)
Age: 61
End: 2021-03-14

## 2021-03-19 ENCOUNTER — TRANSFERRED RECORDS (OUTPATIENT)
Dept: HEALTH INFORMATION MANAGEMENT | Facility: CLINIC | Age: 61
End: 2021-03-19

## 2021-03-22 DIAGNOSIS — Z12.31 VISIT FOR SCREENING MAMMOGRAM: ICD-10-CM

## 2021-03-22 PROCEDURE — 77067 SCR MAMMO BI INCL CAD: CPT | Mod: TC | Performed by: RADIOLOGY

## 2021-04-07 ENCOUNTER — OFFICE VISIT (OUTPATIENT)
Dept: FAMILY MEDICINE | Facility: CLINIC | Age: 61
End: 2021-04-07
Payer: COMMERCIAL

## 2021-04-07 VITALS
SYSTOLIC BLOOD PRESSURE: 116 MMHG | WEIGHT: 214 LBS | DIASTOLIC BLOOD PRESSURE: 75 MMHG | OXYGEN SATURATION: 96 % | HEIGHT: 66 IN | BODY MASS INDEX: 34.39 KG/M2 | HEART RATE: 87 BPM

## 2021-04-07 DIAGNOSIS — E11.9 TYPE 2 DIABETES MELLITUS WITHOUT COMPLICATION, WITHOUT LONG-TERM CURRENT USE OF INSULIN (H): ICD-10-CM

## 2021-04-07 DIAGNOSIS — M16.11 PRIMARY OSTEOARTHRITIS OF RIGHT HIP: ICD-10-CM

## 2021-04-07 DIAGNOSIS — E03.9 HYPOTHYROIDISM, UNSPECIFIED TYPE: ICD-10-CM

## 2021-04-07 DIAGNOSIS — Z01.818 PREOP GENERAL PHYSICAL EXAM: Primary | ICD-10-CM

## 2021-04-07 DIAGNOSIS — I10 BENIGN ESSENTIAL HYPERTENSION: ICD-10-CM

## 2021-04-07 LAB
ANION GAP SERPL CALCULATED.3IONS-SCNC: 2 MMOL/L (ref 3–14)
BASOPHILS # BLD AUTO: 0 10E9/L (ref 0–0.2)
BASOPHILS NFR BLD AUTO: 0.2 %
BUN SERPL-MCNC: 20 MG/DL (ref 7–30)
CALCIUM SERPL-MCNC: 10.8 MG/DL (ref 8.5–10.1)
CHLORIDE SERPL-SCNC: 106 MMOL/L (ref 94–109)
CHOLEST SERPL-MCNC: 143 MG/DL
CO2 SERPL-SCNC: 31 MMOL/L (ref 20–32)
CREAT SERPL-MCNC: 0.71 MG/DL (ref 0.52–1.04)
DIFFERENTIAL METHOD BLD: NORMAL
EOSINOPHIL # BLD AUTO: 0.3 10E9/L (ref 0–0.7)
EOSINOPHIL NFR BLD AUTO: 4.4 %
ERYTHROCYTE [DISTWIDTH] IN BLOOD BY AUTOMATED COUNT: 12.9 % (ref 10–15)
GFR SERPL CREATININE-BSD FRML MDRD: >90 ML/MIN/{1.73_M2}
GLUCOSE SERPL-MCNC: 127 MG/DL (ref 70–99)
HBA1C MFR BLD: 6.6 % (ref 0–5.6)
HCT VFR BLD AUTO: 44.4 % (ref 35–47)
HDLC SERPL-MCNC: 57 MG/DL
HGB BLD-MCNC: 14.5 G/DL (ref 11.7–15.7)
LDLC SERPL CALC-MCNC: 64 MG/DL
LYMPHOCYTES # BLD AUTO: 1.5 10E9/L (ref 0.8–5.3)
LYMPHOCYTES NFR BLD AUTO: 23.4 %
MCH RBC QN AUTO: 29.4 PG (ref 26.5–33)
MCHC RBC AUTO-ENTMCNC: 32.7 G/DL (ref 31.5–36.5)
MCV RBC AUTO: 90 FL (ref 78–100)
MONOCYTES # BLD AUTO: 0.5 10E9/L (ref 0–1.3)
MONOCYTES NFR BLD AUTO: 8.1 %
NEUTROPHILS # BLD AUTO: 4.1 10E9/L (ref 1.6–8.3)
NEUTROPHILS NFR BLD AUTO: 63.9 %
NONHDLC SERPL-MCNC: 86 MG/DL
PLATELET # BLD AUTO: 269 10E9/L (ref 150–450)
POTASSIUM SERPL-SCNC: 4.2 MMOL/L (ref 3.4–5.3)
RBC # BLD AUTO: 4.94 10E12/L (ref 3.8–5.2)
SODIUM SERPL-SCNC: 139 MMOL/L (ref 133–144)
TRIGL SERPL-MCNC: 109 MG/DL
TSH SERPL DL<=0.005 MIU/L-ACNC: 1.77 MU/L (ref 0.4–4)
WBC # BLD AUTO: 6.4 10E9/L (ref 4–11)

## 2021-04-07 PROCEDURE — 80048 BASIC METABOLIC PNL TOTAL CA: CPT | Performed by: PHYSICIAN ASSISTANT

## 2021-04-07 PROCEDURE — 84443 ASSAY THYROID STIM HORMONE: CPT | Performed by: PHYSICIAN ASSISTANT

## 2021-04-07 PROCEDURE — 83036 HEMOGLOBIN GLYCOSYLATED A1C: CPT | Performed by: PHYSICIAN ASSISTANT

## 2021-04-07 PROCEDURE — 99214 OFFICE O/P EST MOD 30 MIN: CPT | Performed by: PHYSICIAN ASSISTANT

## 2021-04-07 PROCEDURE — 85025 COMPLETE CBC W/AUTO DIFF WBC: CPT | Performed by: PHYSICIAN ASSISTANT

## 2021-04-07 PROCEDURE — 93000 ELECTROCARDIOGRAM COMPLETE: CPT | Performed by: PHYSICIAN ASSISTANT

## 2021-04-07 PROCEDURE — 36415 COLL VENOUS BLD VENIPUNCTURE: CPT | Performed by: PHYSICIAN ASSISTANT

## 2021-04-07 PROCEDURE — 80061 LIPID PANEL: CPT | Performed by: PHYSICIAN ASSISTANT

## 2021-04-07 ASSESSMENT — PAIN SCALES - GENERAL: PAINLEVEL: NO PAIN (0)

## 2021-04-07 ASSESSMENT — MIFFLIN-ST. JEOR: SCORE: 1557.45

## 2021-04-07 NOTE — PROGRESS NOTES
[facility]  [address] [facility] [address]  Phone: [phone]  Primary Provider: Kehr, Kristen M  Pre-op Performing Provider: KEHR, KRISTEN M      PREOPERATIVE EVALUATION:  Today's date: 4/7/2021    Wendy Dodd is a 60 year old female who presents for a preoperative evaluation.    Surgical Information:  Surgery/Procedure: right hip replacement  Surgery Location: [facility] orth[facility]ics  Surgeon: Dr iqbal  Surgery Date: 4/22/21  Time of Surgery: to be determined  Where patient plans to recover: At home with family  Fax number for surgical facility: [phone]    Type of Anesthesia Anticipated: to be determined    Assessment & Plan     The proposed surgical procedure is considered INTERMEDIATE risk.    Preop general physical exam  Primary osteoarthritis of right hip  preop COVID 19 testing has been scheduled by her surgical team.   Chronic conditions are well managed.   - EKG 12-lead complete w/read - Clinics  - CBC with platelets and differential    Type 2 diabetes mellitus without complication, without long-term current use of insulin (H)  She will take her metformin the evening prior to her surgery, but hold the day of her surgery.    Hold ASA and NSAIDS x 5-7 days.   All other medications should be taken the day of her surgery.  - Lipid panel reflex to direct LDL Fasting  - Hemoglobin A1c  - atorvastatin (LIPITOR) 10 MG tablet; Take 1 tablet (10 mg) by mouth daily  - metFORMIN (GLUCOPHAGE-XR) 500 MG 24 hr tablet; TAKE TWO TABLETS BY MOUTH EVERY DAY WITH EVENING MEAL    Benign essential hypertension  Stable.   - Basic metabolic panel  - diltiazem ER (TIAZAC) 360 MG 24 hr ER beaded capsule; TAKE ONE CAPSULE BY MOUTH ONCE DAILY  - hydrochlorothiazide (HYDRODIURIL) 25 MG tablet; Take 1 tablet (25 mg) by mouth daily  - lisinopril (ZESTRIL) 10 MG tablet; Take 1 tablet (10 mg) by mouth daily    Hypothyroidism, unspecified type  stable  - TSH with free T4 reflex  -  levothyroxine (SYNTHROID/LEVOTHROID) 112 MCG tablet; Take 1 tablet (112 mcg) by mouth daily    Possible Sleep Apnea: bring equipment with to surgery          Risks and Recommendations:  The patient has the following additional risks and recommendations for perioperative complications:   - No identified additional risk factors other than previously addressed    Medication Instructions:  See above  Hold metformin the day of surgery  Hold NSAIDS and ASA 5-7 days prior to surgery  All other medications should be taken per her routine    RECOMMENDATION:  APPROVAL GIVEN to proceed with proposed procedure, without further diagnostic evaluation.                      Subjective     HPI related to upcoming procedure: Wendy has osteoarthritis in her right hip and will be having a TKA.    Preop Questions 4/4/2021   1. Have you ever had a heart attack or stroke? No   2. Have you ever had surgery on your heart or blood vessels, such as a stent placement, a coronary artery bypass, or surgery on an artery in your head, neck, heart, or legs? No   3. Do you have chest pain with activity? No   4. Do you have a history of  heart failure? No   5. Do you currently have a cold, bronchitis or symptoms of other infection? No   6. Do you have a cough, shortness of breath, or wheezing? No   7. Do you or anyone in your family have previous history of blood clots? No   8. Do you or does anyone in your family have a serious bleeding problem such as prolonged bleeding following surgeries or cuts? No   9. Have you ever had problems with anemia or been told to take iron pills? YES - many years ago   10. Have you had any abnormal blood loss such as black, tarry or bloody stools, or abnormal vaginal bleeding? No   11. Have you ever had a blood transfusion? No   12. Are you willing to have a blood transfusion if it is medically needed before, during, or after your surgery? Yes   13. Have you or any of your relatives ever had problems with anesthesia?  No   14. Do you have sleep apnea, excessive snoring or daytime drowsiness? YES -    14a. Do you have a CPAP machine? Yes   15. Do you have any artifical heart valves or other implanted medical devices like a pacemaker, defibrillator, or continuous glucose monitor? No   16. Do you have artificial joints? No   17. Are you allergic to latex? No   18. Is there any chance that you may be pregnant? No       Health Care Directive:  Patient does not have a Health Care Directive or Living Will: Discussed advance care planning with patient; information given to patient to review.    Preoperative Review of :  No use of opioids or scheduled 2     Status of Chronic Conditions:  DIABETES - Patient has a longstanding history of DiabetesType Type II . Patient is being treated with oral agents and denies significant side effects. Control has been good. Complicating factors include but are not limited to: hypertension, hyperlipidemia and morbid obesity .     HYPERLIPIDEMIA - Patient has a long history of significant Hyperlipidemia requiring medication for treatment with recent good control. Patient reports no problems or side effects with the medication.     HYPERTENSION - Patient has longstanding history of HTN , currently denies any symptoms referable to elevated blood pressure. Specifically denies chest pain, palpitations, dyspnea, orthopnea, PND or peripheral edema. Blood pressure readings have been in normal range. Current medication regimen is as listed below. Patient denies any side effects of medication.     HYPOTHYROIDISM - Patient has a longstanding history of chronic Hypothyroidism. Patient has been doing well, noting no tremor, insomnia, hair loss or changes in skin texture. Continues to take medications as directed, without adverse reactions or side effects. Last TSH   Lab Results   Component Value Date    TSH 1.25 03/10/2020   .        Review of Systems  Constitutional, neuro, ENT, endocrine, pulmonary, cardiac,  gastrointestinal, genitourinary, musculoskeletal, integument and psychiatric systems are negative, except as otherwise noted.    Patient Active Problem List    Diagnosis Date Noted     Esophageal reflux 09/24/2018     Priority: Medium     Type 2 diabetes mellitus without complication, without long-term current use of insulin (H) 04/23/2017     Priority: Medium     Benign essential hypertension 03/27/2017     Priority: Medium     Hypothyroidism, unspecified type 03/27/2017     Priority: Medium     Non morbid obesity due to excess calories 03/27/2017     Priority: Medium     History of colonic polyps 03/01/2017     Priority: Medium     Colonoscopy 2016       Diverticulosis of large intestine 12/12/2016     Priority: Medium     External hemorrhoids 12/12/2016     Priority: Medium     Advanced directives, counseling/discussion 04/21/2016     Priority: Medium     Information given to patient.     STEPHANIE Raymundo MA          Past Medical History:   Diagnosis Date     Cancer (H)      Cerebral infarction (H)      Diabetes (H)      Hypertension      Sleep apnea      Thyroid disease      Type 2 diabetes mellitus without complication, without long-term current use of insulin (H)      Past Surgical History:   Procedure Laterality Date     ABDOMEN SURGERY       APPENDECTOMY       BIOPSY       COLONOSCOPY       ENT SURGERY       Current Outpatient Medications   Medication Sig Dispense Refill     aspirin 81 MG tablet Take 1 tablet (81 mg) by mouth daily 30 tablet 11     atorvastatin (LIPITOR) 10 MG tablet TAKE ONE TABLET BY MOUTH ONCE DAILY 90 tablet 0     blood glucose monitoring (ACCU-CHEK RENY PLUS) test strip Use to test blood sugar 2 times daily or as directed. 200 strip 11     blood glucose monitoring (ACCU-CHEK FASTCLIX) lancets Use to test blood sugar 2 times daily or as directed. 102 each 11     cetirizine (ZYRTEC) 10 MG tablet Take 1 tablet (10 mg) by mouth daily In AM as needed for rash/itching. Can do benadryl at bedtime.  "30 tablet 1     diltiazem ER (TIAZAC) 360 MG 24 hr ER beaded capsule TAKE ONE CAPSULE BY MOUTH ONCE DAILY 90 capsule 1     folic acid 800 MCG TABS        hydrochlorothiazide (HYDRODIURIL) 25 MG tablet Take 1 tablet (25 mg) by mouth daily 90 tablet 1     IRON PO        levothyroxine (SYNTHROID/LEVOTHROID) 112 MCG tablet Take 1 tablet (112 mcg) by mouth daily 90 tablet 3     lisinopril (ZESTRIL) 10 MG tablet Take 1 tablet (10 mg) by mouth daily 90 tablet 1     metFORMIN (GLUCOPHAGE-XR) 500 MG 24 hr tablet TAKE TWO TABLETS BY MOUTH EVERY DAY WITH EVENING MEAL 180 tablet 0     Psyllium (METAMUCIL PO)          Allergies   Allergen Reactions     Penicillins      Sulfa Drugs         Social History     Tobacco Use     Smoking status: Former Smoker     Packs/day: 0.00     Years: 0.00     Pack years: 0.00     Types: Cigarettes, Other     Start date: 1975     Quit date: 2015     Years since quittin.2     Smokeless tobacco: Never Used     Tobacco comment: I use Br[employer]   Substance Use Topics     Alcohol use: Yes     Alcohol/week: 0.0 standard drinks     Family History   Problem Relation Age of Onset     Colon Cancer Father      Other Cancer Father         liver     Other Cancer Mother      Other Cancer Brother      Skin Cancer Sister      History   Drug Use No         Objective     /75   Pulse 87   Ht 1.676 m (5' 6\")   Wt 97.1 kg (214 lb)   SpO2 96%   BMI 34.54 kg/m      Physical Exam    GENERAL APPEARANCE: healthy, alert and no distress     EYES: EOMI, PERRL     HENT: ear canals and TM's normal and nose and mouth without ulcers or lesions     NECK: no adenopathy, no asymmetry, masses, or scars and thyroid normal to palpation     RESP: lungs clear to auscultation - no rales, rhonchi or wheezes     CV: regular rates and rhythm, normal S1 S2, no S3 or S4 and no murmur, click or rub     ABDOMEN:  soft, nontender, no HSM or masses and bowel sounds normal     MS: extremities normal- no gross deformities noted, no " evidence of inflammation in joints, FROM in all extremities.     SKIN: no suspicious lesions or rashes     NEURO: Normal strength and tone, sensory exam grossly normal, mentation intact and speech normal     PSYCH: mentation appears normal. and affect normal/bright     LYMPHATICS: No cervical adenopathy    Recent Labs   Lab Test 12/07/20  1654 03/10/20  0715    141   POTASSIUM 4.6 3.7   CR 0.70 0.58   A1C 7.1* 6.5*        Diagnostics:  Recent Results (from the past 24 hour(s))   Lipid panel reflex to direct LDL Fasting    Collection Time: 04/07/21  9:36 AM   Result Value Ref Range    Cholesterol 143 <200 mg/dL    Triglycerides 109 <150 mg/dL    HDL Cholesterol 57 >49 mg/dL    LDL Cholesterol Calculated 64 <100 mg/dL    Non HDL Cholesterol 86 <130 mg/dL   Hemoglobin A1c    Collection Time: 04/07/21  9:36 AM   Result Value Ref Range    Hemoglobin A1C 6.6 (H) 0 - 5.6 %   Basic metabolic panel    Collection Time: 04/07/21  9:36 AM   Result Value Ref Range    Sodium 139 133 - 144 mmol/L    Potassium 4.2 3.4 - 5.3 mmol/L    Chloride 106 94 - 109 mmol/L    Carbon Dioxide 31 20 - 32 mmol/L    Anion Gap 2 (L) 3 - 14 mmol/L    Glucose 127 (H) 70 - 99 mg/dL    Urea Nitrogen 20 7 - 30 mg/dL    Creatinine 0.71 0.52 - 1.04 mg/dL    GFR Estimate >90 >60 mL/min/[1.73_m2]    GFR Estimate If Black >90 >60 mL/min/[1.73_m2]    Calcium 10.8 (H) 8.5 - 10.1 mg/dL   TSH with free T4 reflex    Collection Time: 04/07/21  9:36 AM   Result Value Ref Range    TSH 1.77 0.40 - 4.00 mU/L   CBC with platelets and differential    Collection Time: 04/07/21  9:36 AM   Result Value Ref Range    WBC 6.4 4.0 - 11.0 10e9/L    RBC Count 4.94 3.8 - 5.2 10e12/L    Hemoglobin 14.5 11.7 - 15.7 g/dL    Hematocrit 44.4 35.0 - 47.0 %    MCV 90 78 - 100 fl    MCH 29.4 26.5 - 33.0 pg    MCHC 32.7 31.5 - 36.5 g/dL    RDW 12.9 10.0 - 15.0 %    Platelet Count 269 150 - 450 10e9/L    % Neutrophils 63.9 %    % Lymphocytes 23.4 %    % Monocytes 8.1 %    %  Eosinophils 4.4 %    % Basophils 0.2 %    Absolute Neutrophil 4.1 1.6 - 8.3 10e9/L    Absolute Lymphocytes 1.5 0.8 - 5.3 10e9/L    Absolute Monocytes 0.5 0.0 - 1.3 10e9/L    Absolute Eosinophils 0.3 0.0 - 0.7 10e9/L    Absolute Basophils 0.0 0.0 - 0.2 10e9/L    Diff Method Automated Method       EKG: appears normal, NSR, poor r wave progression, unchanged from previous tracings    Revised Cardiac Risk Index (RCRI):  The patient has the following serious cardiovascular risks for perioperative complications:   - No serious cardiac risks = 0 points     RCRI Interpretation: 1 point: Class II (low risk - 0.9% complication rate)           Signed Electronically by: Kristen M. Kehr, PA-C  Copy of this evaluation report is provided to requesting physician.    Chart reviewed, agree with evaluation and recommendations above.  Alla Hanson M.D.

## 2021-04-07 NOTE — PATIENT INSTRUCTIONS
Metformin hold the morning of surgery    Hold aspirin 5 days prior to surgery    Hold ibuprofen / aleve, only use tylenol 5 -7 days prior to surgery    Take blood pressure medications and thyroid medication the day of surgery          Preparing for Your Surgery  Getting started  A nurse will call you to review your health history and instructions. They will give you an arrival time based on your scheduled surgery time.  Please be ready to share the following:    Your doctor's clinic name and phone number    Your medical, surgical and anesthesia history    A list of allergies and sensitivities    A list of medicines, including herbal treatments and over-the-counter drugs    Whether the patient has a legal guardian (ask how to send us the papers in advance)  If you have a child who's having surgery, please ask for a copy of Preparing for Your Child's Surgery.    Preparing for surgery    Within 30 days of surgery: Have a pre-op exam (sometimes called an H&P, or History and Physical). This can be done at a clinic or pre-operative center.  ? If you're having a , you may not need this exam. Talk to your care team    At your pre-op exam, talk to your care team about all medicines you take. If you need to stop any medicines before surgery, ask when to start taking them again.  ? We do this for your safety. Many medicines can make you bleed too much during surgery. Some change how well surgery (anesthesia) drugs work.    Call your insurance company to let them know you're having surgery. (If you don't have insurance, call [phone].)    Call your clinic if there's any change in your health. This includes signs of a cold or flu (sore throat, runny nose, cough, rash, fever). It also includes a scrape or scratch near the surgery site.    If you have questions on the day of surgery, call your hospital or surgery center.  Eating and drinking guidelines  For your safety: Unless your surgeon tells you otherwise, follow  the guidelines below.    Eat and drink as usual until 8 hours before surgery. After that, no food or milk.    Drink clear liquids until 2 hours before surgery. These are liquids you can see through, like water, Gatorade and Propel Water. You may also have black coffee and tea (no cream or milk).    Nothing by mouth within 2 hours of surgery. This includes gum, candy and breath mints.    If you drink, stop drinking alcohol the night before surgery.    If your care team tells you to take medicine on the morning of surgery, it's okay to take it with a sip of water.  Preventing infection    Shower or bathe the night before and morning of your surgery. Follow the instructions your clinic gave you. (If no instructions, use regular soap.)    Don't shave or clip hair near your surgery site. We'll remove the hair if needed.    Don't smoke or vape the morning of surgery. You may chew nicotine gum up to 2 hours before surgery. A nicotine patch is okay.  ? Note: Some surgeries require you to completely quit smoking and nicotine. Check with your surgeon.    Your care team will make every effort to keep you safe from infection. We will:  ? Clean our hands often with soap and water (or an alcohol-based hand rub).  ? Clean the skin at your surgery site with a special soap that kills germs.  ? Give you a special gown to keep you warm. (Cold raises the risk of infection.)  ? Wear special hair covers, masks, gowns and gloves during surgery.  ? Give antibiotic medicine, if prescribed. Not all surgeries need antibiotics.  What to bring on the day of surgery    Photo ID and insurance card    Copy of your health care directive, if you have one    Glasses and hearing aides (bring cases)  ? You can't wear contacts during surgery    Inhaler and eye drops, if you use them (tell us about these when you arrive)    CPAP machine or breathing device, if you use them    A few personal items, if spending the night    If you have . . .  ? A pacemaker  or ICD (cardiac defibrillator): Bring the ID card.  ? An implanted stimulator: Bring the remote control.  ? A legal guardian: Bring a copy of the certified (court-stamped) guardianship papers.  Please remove any jewelry, including body piercings. Leave jewelry and other valuables at home.  If you're going home the day of surgery  Important: If you don't follow the rules below, we must cancel your surgery.     Arrange for someone to drive you home after surgery. You may not drive, take a taxi or take public transportation by yourself (unless you'll have local anesthesia only).    Arrange for a responsible adult to stay with you overnight. If you don't, we may keep you in the hospital overnight, and you may need to pay the costs yourself.  Questions?   If you have any questions for your care team, list them here: _________________________________________________________________________________________________________________________________________________________________________________________________________________________________________________________________________________________________________________________  For informational purposes only. Not to replace the advice of your health care provider. Copyright   2003, 2019 [facility]. All rights reserved. Clinically reviewed by Salma Brown MD. [employer] 297013 - REV 4/20.

## 2021-04-07 NOTE — NURSING NOTE
"Chief Complaint   Patient presents with     Pre-Op Exam     Health Maintenance     orders pended, PHQ2, Physical/PAP, Last eye exam       Initial /75   Pulse 87   Ht 1.676 m (5' 6\")   Wt 97.1 kg (214 lb)   SpO2 96%   BMI 34.54 kg/m   Estimated body mass index is 34.54 kg/m  as calculated from the following:    Height as of this encounter: 1.676 m (5' 6\").    Weight as of this encounter: 97.1 kg (214 lb).  Medication Reconciliation: complete  Rose Awad, BELINDA  "

## 2021-04-08 DIAGNOSIS — E83.52 HYPERCALCEMIA: Primary | ICD-10-CM

## 2021-04-08 RX ORDER — ATORVASTATIN CALCIUM 10 MG/1
10 TABLET, FILM COATED ORAL DAILY
Qty: 90 TABLET | Refills: 1 | Status: SHIPPED | OUTPATIENT
Start: 2021-04-08 | End: 2021-11-09

## 2021-04-08 RX ORDER — LISINOPRIL 10 MG/1
10 TABLET ORAL DAILY
Qty: 90 TABLET | Refills: 1 | Status: SHIPPED | OUTPATIENT
Start: 2021-04-08 | End: 2021-07-08

## 2021-04-08 RX ORDER — HYDROCHLOROTHIAZIDE 25 MG/1
25 TABLET ORAL DAILY
Qty: 90 TABLET | Refills: 1 | Status: SHIPPED | OUTPATIENT
Start: 2021-04-08 | End: 2021-11-05

## 2021-04-08 RX ORDER — LEVOTHYROXINE SODIUM 112 UG/1
112 TABLET ORAL DAILY
Qty: 90 TABLET | Refills: 3 | Status: SHIPPED | OUTPATIENT
Start: 2021-04-08 | End: 2021-11-09

## 2021-04-08 RX ORDER — METFORMIN HCL 500 MG
TABLET, EXTENDED RELEASE 24 HR ORAL
Qty: 180 TABLET | Refills: 1 | Status: SHIPPED | OUTPATIENT
Start: 2021-04-08 | End: 2021-11-09

## 2021-04-08 RX ORDER — DILTIAZEM HYDROCHLORIDE 360 MG/1
CAPSULE, EXTENDED RELEASE ORAL
Qty: 90 CAPSULE | Refills: 1 | Status: SHIPPED | OUTPATIENT
Start: 2021-04-08 | End: 2021-11-05

## 2021-04-23 ENCOUNTER — TRANSFERRED RECORDS (OUTPATIENT)
Dept: HEALTH INFORMATION MANAGEMENT | Facility: CLINIC | Age: 61
End: 2021-04-23

## 2021-05-10 ENCOUNTER — TRANSFERRED RECORDS (OUTPATIENT)
Dept: HEALTH INFORMATION MANAGEMENT | Facility: CLINIC | Age: 61
End: 2021-05-10

## 2021-06-21 ENCOUNTER — TRANSFERRED RECORDS (OUTPATIENT)
Dept: HEALTH INFORMATION MANAGEMENT | Facility: CLINIC | Age: 61
End: 2021-06-21

## 2021-06-22 ENCOUNTER — MYC MEDICAL ADVICE (OUTPATIENT)
Dept: FAMILY MEDICINE | Facility: CLINIC | Age: 61
End: 2021-06-22

## 2021-06-30 NOTE — TELEPHONE ENCOUNTER
Contact her surgery team about supplement if they recommended that she take it.   Thank you for giving COVID19 contact information.   I am recommending that they get COVID19 vaccine. p Kristen Kehr PA-C

## 2021-07-08 DIAGNOSIS — I10 BENIGN ESSENTIAL HYPERTENSION: ICD-10-CM

## 2021-07-08 RX ORDER — LISINOPRIL 10 MG/1
TABLET ORAL
Qty: 90 TABLET | Refills: 0 | Status: SHIPPED | OUTPATIENT
Start: 2021-07-08 | End: 2021-10-04

## 2021-10-02 DIAGNOSIS — I10 BENIGN ESSENTIAL HYPERTENSION: ICD-10-CM

## 2021-10-04 RX ORDER — LISINOPRIL 10 MG/1
TABLET ORAL
Qty: 90 TABLET | Refills: 0 | Status: SHIPPED | OUTPATIENT
Start: 2021-10-04 | End: 2021-11-09

## 2021-10-04 NOTE — TELEPHONE ENCOUNTER
Prescription approved per [facility] Refill Protocol.  Stacey Giordano RN, BSN   [facility] [place]

## 2021-10-24 ENCOUNTER — HEALTH MAINTENANCE LETTER (OUTPATIENT)
Age: 61
End: 2021-10-24

## 2021-11-02 ENCOUNTER — MYC MEDICAL ADVICE (OUTPATIENT)
Dept: FAMILY MEDICINE | Facility: CLINIC | Age: 61
End: 2021-11-02

## 2021-11-02 DIAGNOSIS — E11.9 TYPE 2 DIABETES MELLITUS WITHOUT COMPLICATION, WITHOUT LONG-TERM CURRENT USE OF INSULIN (H): Primary | ICD-10-CM

## 2021-11-02 NOTE — TELEPHONE ENCOUNTER
To provider to advise on mychart message regarding PVLabs.  Appears does need A1c, microalbumin?  Has appointment with you Nov 9; are you wanting PVLabs done?  Krupa NEGRON RN

## 2021-11-02 NOTE — TELEPHONE ENCOUNTER
She does not need to be fasting to have the urine test and the A1C.   If she can get them done prior to her appointment, that would be great.   Please help her schedule an appointment.   Kristen Kehr PA-C

## 2021-11-04 DIAGNOSIS — I10 BENIGN ESSENTIAL HYPERTENSION: ICD-10-CM

## 2021-11-04 NOTE — TELEPHONE ENCOUNTER
Routing refill request to provider for review/approval because:  Labs not current:  ALT    Kaleigh Hensley BSN, RN

## 2021-11-05 ENCOUNTER — LAB (OUTPATIENT)
Dept: LAB | Facility: OTHER | Age: 61
End: 2021-11-05
Payer: COMMERCIAL

## 2021-11-05 DIAGNOSIS — E83.52 HYPERCALCEMIA: ICD-10-CM

## 2021-11-05 DIAGNOSIS — E11.9 TYPE 2 DIABETES MELLITUS WITHOUT COMPLICATION, WITHOUT LONG-TERM CURRENT USE OF INSULIN (H): ICD-10-CM

## 2021-11-05 LAB
CA-I BLD-MCNC: 5.3 MG/DL (ref 4.4–5.2)
CREAT UR-MCNC: 63 MG/DL
HBA1C MFR BLD: 7.5 % (ref 0–5.6)
MICROALBUMIN UR-MCNC: 7 MG/L
MICROALBUMIN/CREAT UR: 11.11 MG/G CR (ref 0–25)
PTH-INTACT SERPL-MCNC: 46 PG/ML (ref 18–80)

## 2021-11-05 PROCEDURE — 82306 VITAMIN D 25 HYDROXY: CPT

## 2021-11-05 PROCEDURE — 82043 UR ALBUMIN QUANTITATIVE: CPT

## 2021-11-05 PROCEDURE — 83036 HEMOGLOBIN GLYCOSYLATED A1C: CPT

## 2021-11-05 PROCEDURE — 36415 COLL VENOUS BLD VENIPUNCTURE: CPT

## 2021-11-05 PROCEDURE — 82330 ASSAY OF CALCIUM: CPT

## 2021-11-05 PROCEDURE — 83970 ASSAY OF PARATHORMONE: CPT

## 2021-11-05 RX ORDER — HYDROCHLOROTHIAZIDE 25 MG/1
TABLET ORAL
Qty: 90 TABLET | Refills: 0 | Status: SHIPPED | OUTPATIENT
Start: 2021-11-05 | End: 2021-11-09 | Stop reason: SINTOL

## 2021-11-05 RX ORDER — DILTIAZEM HYDROCHLORIDE 360 MG/1
CAPSULE, EXTENDED RELEASE ORAL
Qty: 90 CAPSULE | Refills: 0 | Status: SHIPPED | OUTPATIENT
Start: 2021-11-05 | End: 2021-11-09

## 2021-11-08 LAB — DEPRECATED CALCIDIOL+CALCIFEROL SERPL-MC: 24 UG/L (ref 20–75)

## 2021-11-09 ENCOUNTER — OFFICE VISIT (OUTPATIENT)
Dept: FAMILY MEDICINE | Facility: CLINIC | Age: 61
End: 2021-11-09
Payer: COMMERCIAL

## 2021-11-09 VITALS
OXYGEN SATURATION: 97 % | TEMPERATURE: 96.6 F | SYSTOLIC BLOOD PRESSURE: 130 MMHG | BODY MASS INDEX: 37.12 KG/M2 | WEIGHT: 230 LBS | DIASTOLIC BLOOD PRESSURE: 81 MMHG | HEART RATE: 78 BPM

## 2021-11-09 DIAGNOSIS — I10 BENIGN ESSENTIAL HYPERTENSION: ICD-10-CM

## 2021-11-09 DIAGNOSIS — E11.9 TYPE 2 DIABETES MELLITUS WITHOUT COMPLICATION, WITHOUT LONG-TERM CURRENT USE OF INSULIN (H): Primary | ICD-10-CM

## 2021-11-09 DIAGNOSIS — E66.01 MORBID OBESITY (H): ICD-10-CM

## 2021-11-09 DIAGNOSIS — E83.52 HYPERCALCEMIA: ICD-10-CM

## 2021-11-09 DIAGNOSIS — E03.9 HYPOTHYROIDISM, UNSPECIFIED TYPE: ICD-10-CM

## 2021-11-09 PROCEDURE — 99214 OFFICE O/P EST MOD 30 MIN: CPT | Performed by: PHYSICIAN ASSISTANT

## 2021-11-09 RX ORDER — DILTIAZEM HYDROCHLORIDE 360 MG/1
CAPSULE, EXTENDED RELEASE ORAL
Qty: 90 CAPSULE | Refills: 1 | Status: SHIPPED | OUTPATIENT
Start: 2021-11-09 | End: 2022-06-21

## 2021-11-09 RX ORDER — ATORVASTATIN CALCIUM 10 MG/1
10 TABLET, FILM COATED ORAL DAILY
Qty: 90 TABLET | Refills: 1 | Status: SHIPPED | OUTPATIENT
Start: 2021-11-09 | End: 2022-06-21

## 2021-11-09 RX ORDER — LISINOPRIL 20 MG/1
10 TABLET ORAL DAILY
Qty: 90 TABLET | Refills: 1 | Status: SHIPPED | OUTPATIENT
Start: 2021-11-09 | End: 2021-11-09

## 2021-11-09 RX ORDER — LISINOPRIL 20 MG/1
20 TABLET ORAL DAILY
Qty: 90 TABLET | Refills: 1 | Status: SHIPPED | OUTPATIENT
Start: 2021-11-09 | End: 2022-06-21

## 2021-11-09 RX ORDER — LEVOTHYROXINE SODIUM 112 UG/1
112 TABLET ORAL DAILY
Qty: 90 TABLET | Refills: 3 | Status: SHIPPED | OUTPATIENT
Start: 2021-11-09 | End: 2022-12-21

## 2021-11-09 RX ORDER — METFORMIN HCL 500 MG
TABLET, EXTENDED RELEASE 24 HR ORAL
Qty: 180 TABLET | Refills: 1 | Status: SHIPPED | OUTPATIENT
Start: 2021-11-09 | End: 2022-05-17

## 2021-11-09 RX ORDER — GABAPENTIN 300 MG/1
CAPSULE ORAL
COMMUNITY
Start: 2021-10-21 | End: 2022-02-15

## 2021-11-09 ASSESSMENT — PAIN SCALES - GENERAL: PAINLEVEL: NO PAIN (0)

## 2021-11-09 NOTE — PROGRESS NOTES
"  Assessment & Plan     Type 2 diabetes mellitus without complication, without long-term current use of insulin (H)  Stable, continue her current medications.   A1C is at goal.   Higher than her past A1C, but she also admits she has not been sticking to her healthy habits and has progress to make with lifestyle changes. She will continue to work on that before more medication adjustment. Recheck in 6 months.   - atorvastatin (LIPITOR) 10 MG tablet; Take 1 tablet (10 mg) by mouth daily  - metFORMIN (GLUCOPHAGE-XR) 500 MG 24 hr tablet; TAKE TWO TABLETS BY MOUTH EVERY DAY WITH EVENING MEAL    Benign essential hypertension  Adjustment is going to be made due to the hypercalcemia.   Stop the hydrochlorothiazide.   Increase the dose of the lisinopril to 20 mg daily instead of 10 mg   Recheck blood pressure, she is monitoring at home and will have this checked in 1 month here in the clinic. Plan to recheck calcium also.   - diltiazem ER (TIAZAC) 360 MG 24 hr ER beaded capsule; 1 table daily  - lisinopril (ZESTRIL) 20 MG tablet; Take 1 tablet (20 mg) by mouth daily    Morbid obesity (H)  See above. Work on lifestyle changes with diet and exercise.     Hypercalcemia  See above. Stop the hydrochlorothiazide since follow up tests are otherwise normal.   Recheck in 1-2 months     Hypothyroidism, unspecified type  Stable. Refills given   - levothyroxine (SYNTHROID/LEVOTHROID) 112 MCG tablet; Take 1 tablet (112 mcg) by mouth daily             BMI:   Estimated body mass index is 37.12 kg/m  as calculated from the following:    Height as of 4/7/21: 1.676 m (5' 6\").    Weight as of this encounter: 104.3 kg (230 lb).   Weight management plan: Discussed healthy diet and exercise guidelines        Return in about 1 month (around 12/9/2021) for Routine Visit / check blood presure and calcium levels at appointment.    Kristen M. Kehr, PA-C  [facility]   Wendy is a 60 year old who presents for the " following health issues     History of Present Illness       Diabetes:   She presents for follow up of diabetes.  She is not checking blood glucose. She has no concerns regarding her diabetes at this time.  She is not experiencing numbness or burning in feet, excessive thirst, blurry vision, weight changes or redness, sores or blisters on feet. The patient has not had a diabetic eye exam in the last 12 months.           Hypothyroid-levothyroxine 112 mcg  Hyperlipidemia - atorvastatin 10 mg  Hypertension- lisinopril 20 mg, Diltiazem  mg   Hypercalcemia - she has had high serum calcium levels consistently over the past year. PTH, ionized calcium and Vitamin D levels added to her previsit tests today. All are normal except for slight elevation of the ionized calcium level.         Review of Systems   Constitutional, HEENT, cardiovascular, pulmonary, GI, , musculoskeletal, neuro, skin, endocrine and psych systems are negative, except as otherwise noted.      Objective    /81   Pulse 78   Temp (!) 96.6  F (35.9  C) (Tympanic)   Wt 104.3 kg (230 lb)   SpO2 97%   BMI 37.12 kg/m    Body mass index is 37.12 kg/m .  Physical Exam   GENERAL: healthy, alert and no distress  NECK: no adenopathy, no asymmetry, masses, or scars and thyroid normal to palpation  RESP: lungs clear to auscultation - no rales, rhonchi or wheezes  CV: regular rate and rhythm, normal S1 S2, no S3 or S4, no murmur, click or rub, no peripheral edema and peripheral pulses strong  MS: no gross musculoskeletal defects noted, no edema  SKIN: no suspicious lesions or rashes  PSYCH: mentation appears normal, affect normal/bright  Diabetic foot exam: normal DP and PT pulses, no trophic changes or ulcerative lesions and normal sensory exam    Lab on 11/05/2021   Component Date Value Ref Range Status     Creatinine Urine mg/dL 11/05/2021 63  mg/dL Final     Albumin Urine mg/L 11/05/2021 7  mg/L Final     Albumin Urine mg/g Cr 11/05/2021 11.11   0.00 - 25.00 mg/g Cr Final     Hemoglobin A1C 11/05/2021 7.5* 0.0 - 5.6 % Final    Normal <5.7%   Prediabetes 5.7-6.4%    Diabetes 6.5% or higher     Note: Adopted from ADA consensus guidelines.     Parathyroid Hormone Intact 11/05/2021 46  18 - 80 pg/mL Final     Vitamin D, Total (25-Hydroxy) 11/05/2021 24  20 - 75 ug/L Final     Calcium Ionized 11/05/2021 5.3* 4.4 - 5.2 mg/dL Final

## 2021-11-09 NOTE — NURSING NOTE
"Chief Complaint   Patient presents with     Diabetes       Initial /81   Pulse 78   Temp (!) 96.6  F (35.9  C) (Tympanic)   Wt 104.3 kg (230 lb)   SpO2 97%   BMI 37.12 kg/m   Estimated body mass index is 37.12 kg/m  as calculated from the following:    Height as of 4/7/21: 1.676 m (5' 6\").    Weight as of this encounter: 104.3 kg (230 lb).  Medication Reconciliation: complete    STEPHANIE Raymundo MA    "

## 2021-11-12 ENCOUNTER — MYC MEDICAL ADVICE (OUTPATIENT)
Dept: FAMILY MEDICINE | Facility: CLINIC | Age: 61
End: 2021-11-12
Payer: COMMERCIAL

## 2021-11-19 ENCOUNTER — TRANSFERRED RECORDS (OUTPATIENT)
Dept: HEALTH INFORMATION MANAGEMENT | Facility: CLINIC | Age: 61
End: 2021-11-19
Payer: COMMERCIAL

## 2021-12-17 ASSESSMENT — ENCOUNTER SYMPTOMS
WEAKNESS: 0
SORE THROAT: 0
HEMATOCHEZIA: 0
HEARTBURN: 0
EYE PAIN: 0
CHILLS: 0
SHORTNESS OF BREATH: 0
DIARRHEA: 0
PARESTHESIAS: 0
NAUSEA: 0
HEADACHES: 0
PALPITATIONS: 0
DYSURIA: 0
FEVER: 0
DIZZINESS: 0
JOINT SWELLING: 0
ARTHRALGIAS: 0
BREAST MASS: 0
HEMATURIA: 0
FREQUENCY: 0
MYALGIAS: 1
CONSTIPATION: 0
COUGH: 0
ABDOMINAL PAIN: 0
NERVOUS/ANXIOUS: 0

## 2021-12-20 ENCOUNTER — OFFICE VISIT (OUTPATIENT)
Dept: FAMILY MEDICINE | Facility: CLINIC | Age: 61
End: 2021-12-20
Payer: COMMERCIAL

## 2021-12-20 VITALS
DIASTOLIC BLOOD PRESSURE: 78 MMHG | TEMPERATURE: 97.1 F | HEIGHT: 68 IN | BODY MASS INDEX: 35.46 KG/M2 | HEART RATE: 76 BPM | SYSTOLIC BLOOD PRESSURE: 124 MMHG | WEIGHT: 234 LBS | OXYGEN SATURATION: 96 %

## 2021-12-20 DIAGNOSIS — Z00.00 ROUTINE GENERAL MEDICAL EXAMINATION AT A HEALTH CARE FACILITY: Primary | ICD-10-CM

## 2021-12-20 DIAGNOSIS — L30.4 INTERTRIGO: ICD-10-CM

## 2021-12-20 DIAGNOSIS — Z12.11 COLON CANCER SCREENING: ICD-10-CM

## 2021-12-20 PROCEDURE — G0145 SCR C/V CYTO,THINLAYER,RESCR: HCPCS | Performed by: PHYSICIAN ASSISTANT

## 2021-12-20 PROCEDURE — 87624 HPV HI-RISK TYP POOLED RSLT: CPT | Performed by: PHYSICIAN ASSISTANT

## 2021-12-20 PROCEDURE — 99396 PREV VISIT EST AGE 40-64: CPT | Performed by: PHYSICIAN ASSISTANT

## 2021-12-20 RX ORDER — NYSTATIN 100000 [USP'U]/G
POWDER TOPICAL 3 TIMES DAILY PRN
Qty: 60 G | Refills: 3 | Status: SHIPPED | OUTPATIENT
Start: 2021-12-20 | End: 2022-02-15

## 2021-12-20 ASSESSMENT — ENCOUNTER SYMPTOMS
HEMATURIA: 0
COUGH: 0
BREAST MASS: 0
SHORTNESS OF BREATH: 0
MYALGIAS: 1
DIARRHEA: 0
PALPITATIONS: 0
SORE THROAT: 0
PARESTHESIAS: 0
ARTHRALGIAS: 0
FREQUENCY: 0
HEMATOCHEZIA: 0
WEAKNESS: 0
FEVER: 0
HEARTBURN: 0
EYE PAIN: 0
NAUSEA: 0
DYSURIA: 0
DIZZINESS: 0
CONSTIPATION: 0
CHILLS: 0
NERVOUS/ANXIOUS: 0
ABDOMINAL PAIN: 0
JOINT SWELLING: 0
HEADACHES: 0

## 2021-12-20 ASSESSMENT — PAIN SCALES - GENERAL: PAINLEVEL: NO PAIN (0)

## 2021-12-20 ASSESSMENT — MIFFLIN-ST. JEOR: SCORE: 1666.98

## 2021-12-20 NOTE — PROGRESS NOTES
SUBJECTIVE:   CC: Wendy Dodd is an 61 year old woman who presents for preventive health visit.       Patient has been advised of split billing requirements and indicates understanding: Yes  Healthy Habits:     Getting at least 3 servings of Calcium per day:  NO    Bi-annual eye exam:  NO    Dental care twice a year:  Yes    Sleep apnea or symptoms of sleep apnea:  Sleep apnea    Diet:  Regular (no restrictions), Diabetic and Carbohydrate counting    Frequency of exercise:  None    Taking medications regularly:  Yes    Medication side effects:  None    PHQ-2 Total Score: 0    Additional concerns today:  No        Today's PHQ-2 Score:   PHQ-2 (  [employer]) 2021   Q1: Little interest or pleasure in doing things 0   Q2: Feeling down, depressed or hopeless 0   PHQ-2 Score 0   PHQ-2 Total Score (12-17 Years)- Positive if 3 or more points; Administer PHQ-A if positive -   Q1: Little interest or pleasure in doing things Not at all   Q2: Feeling down, depressed or hopeless Not at all   PHQ-2 Score 0       Abuse: Current or Past (Physical, Sexual or Emotional) - No  Do you feel safe in your environment? Yes    Have you ever done Advance Care Planning? (For example, a Health Directive, POLST, or a discussion with a medical provider or your loved ones about your wishes): No, advance care planning information given to patient to review.  Advanced care planning was discussed at today's visit.    Social History     Tobacco Use     Smoking status: Former Smoker     Packs/day: 0.00     Years: 0.00     Pack years: 0.00     Types: Cigarettes, Other     Start date: 1975     Quit date: 2015     Years since quittin.9     Smokeless tobacco: Never Used     Tobacco comment: I use Ecig   Substance Use Topics     Alcohol use: Yes     Alcohol/week: 0.0 standard drinks     If you drink alcohol do you typically have >3 drinks per day or >7 drinks per week? No    Alcohol Use 2021   Prescreen: >3 drinks/day or  >7 drinks/week? No   Prescreen: >3 drinks/day or >7 drinks/week? -   No flowsheet data found.    Reviewed orders with patient.  Reviewed health maintenance and updated orders accordingly - Yes  BP Readings from Last 3 Encounters:   21 124/78   21 130/81   21 116/75    Wt Readings from Last 3 Encounters:   21 106.1 kg (234 lb)   21 104.3 kg (230 lb)   21 97.1 kg (214 lb)                  Patient Active Problem List   Diagnosis     Advanced directives, counseling/discussion     Diverticulosis of large intestine     External hemorrhoids     History of colonic polyps     Benign essential hypertension     Hypothyroidism, unspecified type     Non morbid obesity due to excess calories     Type 2 diabetes mellitus without complication, without long-term current use of insulin (H)     Esophageal reflux     Morbid obesity (H)     Past Surgical History:   Procedure Laterality Date     ABDOMEN SURGERY       APPENDECTOMY       BIOPSY       COLONOSCOPY       ENT SURGERY         Social History     Tobacco Use     Smoking status: Former Smoker     Packs/day: 0.00     Years: 0.00     Pack years: 0.00     Types: Cigarettes, Other     Start date: 1975     Quit date: 2015     Years since quittin.9     Smokeless tobacco: Never Used     Tobacco comment: I use Ecig   Substance Use Topics     Alcohol use: Yes     Alcohol/week: 0.0 standard drinks     Family History   Problem Relation Age of Onset     Colon Cancer Father      Other Cancer Father         liver     Other Cancer Mother      Other Cancer Brother      Skin Cancer Sister          Current Outpatient Medications   Medication Sig Dispense Refill     aspirin 81 MG tablet Take 1 tablet (81 mg) by mouth daily 30 tablet 11     atorvastatin (LIPITOR) 10 MG tablet Take 1 tablet (10 mg) by mouth daily 90 tablet 1     blood glucose monitoring (ACCU-CHEK RENY PLUS) test strip Use to test blood sugar 2 times daily or as directed. 200 strip 11      blood glucose monitoring (ACCU-CHEK FASTCLIX) lancets Use to test blood sugar 2 times daily or as directed. 102 each 11     cetirizine (ZYRTEC) 10 MG tablet Take 1 tablet (10 mg) by mouth daily In AM as needed for rash/itching. Can do benadryl at bedtime. 30 tablet 1     Cyanocobalamin (B-12 PO)        diltiazem ER (TIAZAC) 360 MG 24 hr ER beaded capsule 1 table daily 90 capsule 1     folic acid 800 MCG TABS        gabapentin (NEURONTIN) 300 MG capsule TAKE ONE CAPSULE BY MOUTH THREE TIMES A DAY       IRON PO        levothyroxine (SYNTHROID/LEVOTHROID) 112 MCG tablet Take 1 tablet (112 mcg) by mouth daily 90 tablet 3     lisinopril (ZESTRIL) 20 MG tablet Take 1 tablet (20 mg) by mouth daily 90 tablet 1     metFORMIN (GLUCOPHAGE-XR) 500 MG 24 hr tablet TAKE TWO TABLETS BY MOUTH EVERY DAY WITH EVENING MEAL 180 tablet 1     nystatin (MYCOSTATIN) 916412 UNIT/GM external powder Apply topically 3 times daily as needed (for skin rash) 60 g 3     Allergies   Allergen Reactions     Penicillins      Sulfa Drugs      Recent Labs   Lab Test 11/05/21  1117 04/07/21  0936 12/07/20  1654 03/10/20  0715 05/15/19  0710 03/04/19  0710   A1C 7.5* 6.6* 7.1* 6.5*   < > 6.1*   LDL  --  64  --  74  --  52   HDL  --  57  --  64  --  53   TRIG  --  109  --  89  --  92   CR  --  0.71 0.70 0.58  --  0.57   GFRESTIMATED  --  >90 >90 >90  --  >90   GFRESTBLACK  --  >90 >90 >90  --  >90   POTASSIUM  --  4.2 4.6 3.7  --  4.0   TSH  --  1.77  --  1.25   < > 0.36*    < > = values in this interval not displayed.        Breast Cancer Screening:    FHS-7:   Breast CA Risk Assessment (FHS-7) 12/17/2021   Did any of your first-degree relatives have breast or ovarian cancer? No   Did any of your relatives have bilateral breast cancer? No   Did any man in your family have breast cancer? No   Did any woman in your family have breast and ovarian cancer? No   Did any woman in your family have breast cancer before age 50 y? No   Do you have 2 or more  relatives with breast and/or ovarian cancer? No   Do you have 2 or more relatives with breast and/or bowel cancer? No       Mammogram Screening: Recommended mammography every 1-2 years with patient discussion and risk factor consideration  Pertinent mammograms are reviewed under the imaging tab.    History of abnormal Pap smear:   NO - age 30-65 PAP every 5 years with negative HPV co-testing recommended  Last 3 Pap and HPV Results:   PAP / HPV Latest Ref Rng & Units 3/27/2017   PAP (Historical) - NIL   HPV16 NEG Negative   HPV18 NEG Negative   HRHPV NEG Negative     PAP / HPV Latest Ref Rng & Units 3/27/2017   PAP (Historical) - NIL   HPV16 NEG Negative   HPV18 NEG Negative   HRHPV NEG Negative     Reviewed and updated as needed this visit by clinical staff  Tobacco  Allergies  Meds   Med Hx  Surg Hx  Fam Hx  Soc Hx       Reviewed and updated as needed this visit by Provider               Past Medical History:   Diagnosis Date     Cancer (H)      Cerebral infarction (H)      Diabetes (H)      Hypertension      Sleep apnea      Thyroid disease      Type 2 diabetes mellitus without complication, without long-term current use of insulin (H)       Past Surgical History:   Procedure Laterality Date     ABDOMEN SURGERY       APPENDECTOMY       BIOPSY       COLONOSCOPY       ENT SURGERY       OB History   No obstetric history on file.       Review of Systems   Constitutional: Negative for chills and fever.   HENT: Negative for congestion, ear pain, hearing loss and sore throat.    Eyes: Negative for pain and visual disturbance.   Respiratory: Negative for cough and shortness of breath.    Cardiovascular: Negative for chest pain, palpitations and peripheral edema.   Gastrointestinal: Negative for abdominal pain, constipation, diarrhea, heartburn, hematochezia and nausea.   Breasts:  Negative for tenderness, breast mass and discharge.   Genitourinary: Negative for dysuria, frequency, genital sores, hematuria, pelvic  "pain, urgency, vaginal bleeding and vaginal discharge.   Musculoskeletal: Positive for myalgias. Negative for arthralgias and joint swelling.   Skin: Negative for rash.   Neurological: Negative for dizziness, weakness, headaches and paresthesias.   Psychiatric/Behavioral: Negative for mood changes. The patient is not nervous/anxious.           OBJECTIVE:   /78   Pulse 76   Temp 97.1  F (36.2  C) (Tympanic)   Ht 1.715 m (5' 7.5\")   Wt 106.1 kg (234 lb)   SpO2 96%   BMI 36.11 kg/m    Physical Exam  GENERAL APPEARANCE: healthy, alert and no distress  EYES: Eyes grossly normal to inspection, PERRL and conjunctivae and sclerae normal  HENT: ear canals and TM's normal, nose and mouth without ulcers or lesions, oropharynx clear and oral mucous membranes moist  NECK: no adenopathy, no asymmetry, masses, or scars and thyroid normal to palpation  RESP: lungs clear to auscultation - no rales, rhonchi or wheezes  BREAST: normal without masses, tenderness or nipple discharge and no palpable axillary masses or adenopathy  CV: regular rate and rhythm, normal S1 S2, no S3 or S4, no murmur, click or rub, no peripheral edema and peripheral pulses strong  ABDOMEN: soft, nontender, no hepatosplenomegaly, no masses and bowel sounds normal   (female): normal female external genitalia, normal urethral meatus, vaginal mucosal atrophy noted, normal cervix, adnexae, and uterus without masses or abnormal discharge  MS: no musculoskeletal defects are noted and gait is age appropriate without ataxia  SKIN: intertrigo in groin fold on the left.   NEURO: Normal strength and tone, sensory exam grossly normal, mentation intact and speech normal  PSYCH: mentation appears normal and affect normal/bright    Diagnostic Test Results:  Labs reviewed in Epic    ASSESSMENT/PLAN:   (Z00.00) Routine general medical examination at a health care facility  (primary encounter diagnosis)  Comment: Health maintenance reviewed and updated.  She plans " "to check on coverage for shingles vaccine   Defer lung cancer screening.   Declines influenza vaccine.   Plan: PAP screen with HPV - recommended age 30 - 65         years            (L30.4) Intertrigo  Comment: treat with nystatin powder  Plan: nystatin (MYCOSTATIN) 538029 UNIT/GM external         powder            (Z12.11) Colon cancer screening  Comment: she had to cancel her last appointment due to COVID 19 infection. She will reschedule.   Plan: Adult Gastro Ref - Procedure Only              Patient has been advised of split billing requirements and indicates understanding: Yes  COUNSELING:  Reviewed preventive health counseling, as reflected in patient instructions       Regular exercise       Healthy diet/nutrition    Estimated body mass index is 36.11 kg/m  as calculated from the following:    Height as of this encounter: 1.715 m (5' 7.5\").    Weight as of this encounter: 106.1 kg (234 lb).    Weight management plan: Discussed healthy diet and exercise guidelines.    She reports that she quit smoking about 6 years ago. Her smoking use included cigarettes and other. She started smoking about 47 years ago. She smoked 0.00 packs per day for 0.00 years. She has never used smokeless tobacco.      Counseling Resources:  ATP IV Guidelines  Pooled Cohorts Equation Calculator  Breast Cancer Risk Calculator  BRCA-Related Cancer Risk Assessment: FHS-7 Tool  FRAX Risk Assessment  ICSI Preventive Guidelines  Dietary Guidelines for Americans, 2010  USDA's MyPlate  ASA Prophylaxis  Lung CA Screening    Kristen M. Kehr, PA-C [facility]  "

## 2021-12-20 NOTE — PATIENT INSTRUCTIONS
Check on shingles vaccine            Preventive Health Recommendations  Female Ages 50 - 64    Yearly exam: See your health care provider every year in order to  o Review health changes.   o Discuss preventive care.    o Review your medicines if your doctor has prescribed any.      Get a Pap test every three years (unless you have an abnormal result and your provider advises testing more often).    If you get Pap tests with HPV test, you only need to test every 5 years, unless you have an abnormal result.     You do not need a Pap test if your uterus was removed (hysterectomy) and you have not had cancer.    You should be tested each year for STDs (sexually transmitted diseases) if you're at risk.     Have a mammogram every 1 to 2 years.    Have a colonoscopy at age 50, or have a yearly FIT test (stool test). These exams screen for colon cancer.      Have a cholesterol test every 5 years, or more often if advised.    Have a diabetes test (fasting glucose) every three years. If you are at risk for diabetes, you should have this test more often.     If you are at risk for osteoporosis (brittle bone disease), think about having a bone density scan (DEXA).    Shots: Get a flu shot each year. Get a tetanus shot every 10 years.    Nutrition:     Eat at least 5 servings of fruits and vegetables each day.    Eat whole-grain bread, whole-wheat pasta and brown rice instead of white grains and rice.    Get adequate Calcium and Vitamin D.     Lifestyle    Exercise at least 150 minutes a week (30 minutes a day, 5 days a week). This will help you control your weight and prevent disease.    Limit alcohol to one drink per day.    No smoking.     Wear sunscreen to prevent skin cancer.     See your dentist every six months for an exam and cleaning.    See your eye doctor every 1 to 2 years.

## 2021-12-20 NOTE — NURSING NOTE
"Chief Complaint   Patient presents with     Physical       Initial BP (!) 140/84   Pulse 76   Temp 97.1  F (36.2  C) (Tympanic)   Ht 1.715 m (5' 7.5\")   Wt 106.1 kg (234 lb)   SpO2 96%   BMI 36.11 kg/m   Estimated body mass index is 36.11 kg/m  as calculated from the following:    Height as of this encounter: 1.715 m (5' 7.5\").    Weight as of this encounter: 106.1 kg (234 lb).  Medication Reconciliation: complete    STEPHANIE Raymundo MA    "

## 2021-12-23 LAB
BKR LAB AP GYN ADEQUACY: NORMAL
BKR LAB AP GYN INTERPRETATION: NORMAL
BKR LAB AP HPV REFLEX: NORMAL
BKR LAB AP PREVIOUS ABNORMAL: NORMAL
PATH REPORT.COMMENTS IMP SPEC: NORMAL
PATH REPORT.COMMENTS IMP SPEC: NORMAL
PATH REPORT.RELEVANT HX SPEC: NORMAL

## 2021-12-27 LAB
HUMAN PAPILLOMA VIRUS 16 DNA: NEGATIVE
HUMAN PAPILLOMA VIRUS 18 DNA: NEGATIVE
HUMAN PAPILLOMA VIRUS FINAL DIAGNOSIS: NORMAL
HUMAN PAPILLOMA VIRUS OTHER HR: NEGATIVE

## 2022-01-17 ENCOUNTER — TELEPHONE (OUTPATIENT)
Dept: GASTROENTEROLOGY | Facility: CLINIC | Age: 62
End: 2022-01-17
Payer: COMMERCIAL

## 2022-01-17 ENCOUNTER — HOSPITAL ENCOUNTER (OUTPATIENT)
Facility: CLINIC | Age: 62
End: 2022-01-17
Attending: SPECIALIST | Admitting: SPECIALIST
Payer: COMMERCIAL

## 2022-02-05 DIAGNOSIS — Z11.59 ENCOUNTER FOR SCREENING FOR OTHER VIRAL DISEASES: Primary | ICD-10-CM

## 2022-02-08 ENCOUNTER — TELEPHONE (OUTPATIENT)
Dept: GASTROENTEROLOGY | Facility: CLINIC | Age: 62
End: 2022-02-08
Payer: COMMERCIAL

## 2022-02-08 NOTE — TELEPHONE ENCOUNTER
Patient called because she rescheduled and they didn't get the new instructions with dates and times. I sent what she needed

## 2022-02-15 ENCOUNTER — OFFICE VISIT (OUTPATIENT)
Dept: FAMILY MEDICINE | Facility: CLINIC | Age: 62
End: 2022-02-15
Payer: COMMERCIAL

## 2022-02-15 ENCOUNTER — MYC MEDICAL ADVICE (OUTPATIENT)
Dept: FAMILY MEDICINE | Facility: CLINIC | Age: 62
End: 2022-02-15
Payer: COMMERCIAL

## 2022-02-15 VITALS
WEIGHT: 226 LBS | OXYGEN SATURATION: 95 % | SYSTOLIC BLOOD PRESSURE: 117 MMHG | HEIGHT: 68 IN | HEART RATE: 72 BPM | BODY MASS INDEX: 34.25 KG/M2 | DIASTOLIC BLOOD PRESSURE: 80 MMHG | TEMPERATURE: 97.3 F

## 2022-02-15 DIAGNOSIS — T78.40XA ALLERGIC REACTION, INITIAL ENCOUNTER: Primary | ICD-10-CM

## 2022-02-15 PROCEDURE — 99213 OFFICE O/P EST LOW 20 MIN: CPT | Performed by: PHYSICIAN ASSISTANT

## 2022-02-15 RX ORDER — EPINEPHRINE 0.3 MG/.3ML
0.3 INJECTION SUBCUTANEOUS ONCE
Qty: 0.3 ML | Refills: 1 | Status: SHIPPED | OUTPATIENT
Start: 2022-02-15 | End: 2022-02-15

## 2022-02-15 ASSESSMENT — PAIN SCALES - GENERAL: PAINLEVEL: MILD PAIN (2)

## 2022-02-15 ASSESSMENT — MIFFLIN-ST. JEOR: SCORE: 1630.69

## 2022-02-15 NOTE — NURSING NOTE
"Chief Complaint   Patient presents with     Abdominal Pain       Initial /80   Pulse 72   Temp 97.3  F (36.3  C) (Tympanic)   Ht 1.715 m (5' 7.5\")   Wt 102.5 kg (226 lb)   SpO2 95%   BMI 34.87 kg/m   Estimated body mass index is 34.87 kg/m  as calculated from the following:    Height as of this encounter: 1.715 m (5' 7.5\").    Weight as of this encounter: 102.5 kg (226 lb).  Medication Reconciliation: complete    STEPHANIE Raymundo MA    "

## 2022-02-15 NOTE — PROGRESS NOTES
"  Assessment & Plan     Allergic reaction, initial encounter  Unsure of what she had the reaction to, but she will stay away from cashews and foods that she ate before the reaction.   She had pictures of the swelling in her face and changes that occurred.   She is going to  a prescription for an epi pen. Have this on hand if she has another reaction.   Referral also given for allergy testing.   - Adult Allergy/Asthma Referral; Future  - EPINEPHrine (ANY BX GENERIC EQUIV) 0.3 MG/0.3ML injection 2-pack; Inject 0.3 mLs (0.3 mg) into the muscle once for 1 dose                 Return in about 2 weeks (around 3/1/2022) for specialty.    Kristen M. Kehr, PA-C [facility]   Wendy is a 61 year old who presents for the following health issues     HPI     Wendy had an allergic type reaction 3 days ago.   She thinks she may have had a reaction to cashews or cornbeef.  She had had both of these foods in the past without an issue.     Symptoms started after eating cashews and slim jims for a snack.   She had redness in her face, swelling of her lips and the back of her throat felt swollen. She then started vomiting and had diarrhea. She took liquid benadryl and rested and the swelling improved quickly. She had crampy abdominal pain for the next few days and the diarrhea and pain eventually resolved also.     She is asymptomatic today. No longer having diarrhea. The cramping was dull this morning, but no longer.         Review of Systems   Constitutional, HEENT, cardiovascular, pulmonary, GI, , musculoskeletal, neuro, skin, endocrine and psych systems are negative, except as otherwise noted.      Objective    /80   Pulse 72   Temp 97.3  F (36.3  C) (Tympanic)   Ht 1.715 m (5' 7.5\")   Wt 102.5 kg (226 lb)   SpO2 95%   BMI 34.87 kg/m    Body mass index is 34.87 kg/m .  Physical Exam   GENERAL: healthy, alert and no distress  EYES: Eyes grossly normal to inspection, PERRL and " conjunctivae and sclerae normal  HENT: normal cephalic/atraumatic, nose and mouth without ulcers or lesions, oropharynx clear, oral mucous membranes moist and no swelling of lips  MS: no gross musculoskeletal defects noted, no edema  SKIN: no suspicious lesions or rashes  PSYCH: mentation appears normal, affect normal/bright

## 2022-02-18 ENCOUNTER — LAB (OUTPATIENT)
Dept: LAB | Facility: OTHER | Age: 62
End: 2022-02-18
Payer: COMMERCIAL

## 2022-02-18 DIAGNOSIS — Z11.59 ENCOUNTER FOR SCREENING FOR OTHER VIRAL DISEASES: ICD-10-CM

## 2022-02-18 PROCEDURE — U0005 INFEC AGEN DETEC AMPLI PROBE: HCPCS

## 2022-02-18 PROCEDURE — U0003 INFECTIOUS AGENT DETECTION BY NUCLEIC ACID (DNA OR RNA); SEVERE ACUTE RESPIRATORY SYNDROME CORONAVIRUS 2 (SARS-COV-2) (CORONAVIRUS DISEASE [COVID-19]), AMPLIFIED PROBE TECHNIQUE, MAKING USE OF HIGH THROUGHPUT TECHNOLOGIES AS DESCRIBED BY CMS-2020-01-R: HCPCS

## 2022-02-18 RX ORDER — GABAPENTIN 300 MG/1
300 CAPSULE ORAL DAILY
COMMUNITY
End: 2023-07-26

## 2022-02-19 LAB — SARS-COV-2 RNA RESP QL NAA+PROBE: NEGATIVE

## 2022-02-21 ENCOUNTER — ANESTHESIA EVENT (OUTPATIENT)
Dept: GASTROENTEROLOGY | Facility: CLINIC | Age: 62
End: 2022-02-21
Payer: COMMERCIAL

## 2022-02-21 ASSESSMENT — LIFESTYLE VARIABLES: TOBACCO_USE: 1

## 2022-02-22 ENCOUNTER — ANESTHESIA (OUTPATIENT)
Dept: GASTROENTEROLOGY | Facility: CLINIC | Age: 62
End: 2022-02-22
Payer: COMMERCIAL

## 2022-02-22 ENCOUNTER — HOSPITAL ENCOUNTER (OUTPATIENT)
Facility: CLINIC | Age: 62
Discharge: HOME | End: 2022-02-22
Attending: SPECIALIST | Admitting: SPECIALIST
Payer: COMMERCIAL

## 2022-02-22 VITALS
DIASTOLIC BLOOD PRESSURE: 69 MMHG | RESPIRATION RATE: 16 BRPM | TEMPERATURE: 98 F | SYSTOLIC BLOOD PRESSURE: 105 MMHG | HEART RATE: 52 BPM | OXYGEN SATURATION: 98 %

## 2022-02-22 DIAGNOSIS — Z12.11 SCREENING FOR COLORECTAL CANCER: Primary | ICD-10-CM

## 2022-02-22 DIAGNOSIS — Z12.12 SCREENING FOR COLORECTAL CANCER: Primary | ICD-10-CM

## 2022-02-22 LAB — COLONOSCOPY: NORMAL

## 2022-02-22 PROCEDURE — 250N000009 HC RX 250: Performed by: NURSE ANESTHETIST, CERTIFIED REGISTERED

## 2022-02-22 PROCEDURE — 370N000017 HC ANESTHESIA TECHNICAL FEE, PER MIN: Performed by: SPECIALIST

## 2022-02-22 PROCEDURE — G0105 COLORECTAL SCRN; HI RISK IND: HCPCS | Performed by: SPECIALIST

## 2022-02-22 PROCEDURE — 258N000003 HC RX IP 258 OP 636: Performed by: NURSE ANESTHETIST, CERTIFIED REGISTERED

## 2022-02-22 PROCEDURE — 250N000011 HC RX IP 250 OP 636: Performed by: NURSE ANESTHETIST, CERTIFIED REGISTERED

## 2022-02-22 PROCEDURE — 45378 DIAGNOSTIC COLONOSCOPY: CPT | Performed by: SPECIALIST

## 2022-02-22 RX ORDER — LIDOCAINE 40 MG/G
CREAM TOPICAL
Status: DISCONTINUED | OUTPATIENT
Start: 2022-02-22 | End: 2022-02-22 | Stop reason: HOSPADM

## 2022-02-22 RX ORDER — LIDOCAINE HYDROCHLORIDE 20 MG/ML
INJECTION, SOLUTION INFILTRATION; PERINEURAL PRN
Status: DISCONTINUED | OUTPATIENT
Start: 2022-02-22 | End: 2022-02-22

## 2022-02-22 RX ORDER — PROPOFOL 10 MG/ML
INJECTION, EMULSION INTRAVENOUS PRN
Status: DISCONTINUED | OUTPATIENT
Start: 2022-02-22 | End: 2022-02-22

## 2022-02-22 RX ORDER — PROPOFOL 10 MG/ML
INJECTION, EMULSION INTRAVENOUS CONTINUOUS PRN
Status: DISCONTINUED | OUTPATIENT
Start: 2022-02-22 | End: 2022-02-22

## 2022-02-22 RX ORDER — SODIUM CHLORIDE, SODIUM LACTATE, POTASSIUM CHLORIDE, CALCIUM CHLORIDE 600; 310; 30; 20 MG/100ML; MG/100ML; MG/100ML; MG/100ML
INJECTION, SOLUTION INTRAVENOUS CONTINUOUS
Status: DISCONTINUED | OUTPATIENT
Start: 2022-02-22 | End: 2022-02-22 | Stop reason: HOSPADM

## 2022-02-22 RX ADMIN — SODIUM CHLORIDE, POTASSIUM CHLORIDE, SODIUM LACTATE AND CALCIUM CHLORIDE: 600; 310; 30; 20 INJECTION, SOLUTION INTRAVENOUS at 12:14

## 2022-02-22 RX ADMIN — PROPOFOL 150 MCG/KG/MIN: 10 INJECTION, EMULSION INTRAVENOUS at 12:28

## 2022-02-22 RX ADMIN — LIDOCAINE HYDROCHLORIDE 50 MG: 20 INJECTION, SOLUTION INFILTRATION; PERINEURAL at 12:28

## 2022-02-22 RX ADMIN — PROPOFOL 70 MG: 10 INJECTION, EMULSION INTRAVENOUS at 12:28

## 2022-02-22 NOTE — ANESTHESIA POSTPROCEDURE EVALUATION
Patient: Wendy Dodd    Procedure: Procedure(s):  COLONOSCOPY       Anesthesia Type:  MAC    Note:  Disposition: Outpatient   Postop Pain Control: Uneventful            Sign Out: Well controlled pain   PONV: No   Neuro/Psych: Uneventful            Sign Out: Acceptable/Baseline neuro status   Airway/Respiratory: Uneventful            Sign Out: Acceptable/Baseline resp. status   CV/Hemodynamics: Uneventful            Sign Out: Acceptable CV status   Other NRE: NONE   DID A NON-ROUTINE EVENT OCCUR? No    Event details/Postop Comments:  Pt was happy with anesthesia care.  No complications.  I will follow up with the pt if needed.           Last vitals:  Vitals Value Taken Time   BP 84/63 02/22/22 1301   Temp     Pulse 63 02/22/22 1301   Resp     SpO2 97 % 02/22/22 1303   Vitals shown include unvalidated device data.    Electronically Signed By: ALINA Solis CRNA  February 22, 2022  1:04 PM

## 2022-02-22 NOTE — H&P
[facility] History and Physical    Wendy Dodd MRN# [id number]   Age: 61 year old YOB: 1960     Date of Admission:  (Not on file)    Home clinic: [facility]  Primary care provider: Kehr, Kristen M          Impression and Plan:   Impression:   Special screening for malignant neoplasms, colon [Z12.11]  Hx of polyps.   Last colonoscopy  - multiple polyps      Plan:   Proceed to Colonoscopy as planned.  The procedure, risks(bleeding, perforation), benefits and alternatives were discussed and the patient agrees to proceed. Cleared for Anesthesia             Chief Complaint:   Special screening for malignant neoplasms, colon [Z12.11]    History is obtained from the patient          History of Present Illness:   This 61 year old female is being seen at this time for evaluation for colonoscopy.  Father with colon ca around age 60.  No complaints.           Past Medical History:     Past Medical History:   Diagnosis Date     Cancer (H)      Cerebral infarction (H)      Diabetes (H)      Hypertension      Sleep apnea      Thyroid disease      Type 2 diabetes mellitus without complication, without long-term current use of insulin (H)             Past Surgical History:     Past Surgical History:   Procedure Laterality Date     ABDOMEN SURGERY       APPENDECTOMY       BIOPSY       COLONOSCOPY       ENT SURGERY              Social History:     Social History     Tobacco Use     Smoking status: Former Smoker     Packs/day: 0.00     Years: 0.00     Pack years: 0.00     Types: Cigarettes, Other     Start date: 1975     Quit date: 2015     Years since quittin.1     Smokeless tobacco: Never Used     Tobacco comment: I use Ecig   Substance Use Topics     Alcohol use: Yes     Alcohol/week: 0.0 standard drinks            Family History:     Family History   Problem Relation Age of Onset     Colon Cancer Father      Other Cancer Father         liver     Other Cancer  Mother      Other Cancer Brother      Skin Cancer Sister             Immunizations:     VACCINE/DOSE   Diptheria   DPT   DTAP   HBIG   Hepatitis A   Hepatitis B   HIB   Influenza   Measles   Meningococcal   MMR   Mumps   Pneumococcal   Polio   Rubella   Small Pox   TDAP   Varicella   Zoster            Allergies:     Allergies   Allergen Reactions     Penicillins      Sulfa Drugs             Medications:     No current facility-administered medications for this encounter.     Current Outpatient Medications   Medication Sig     aspirin 81 MG tablet Take 1 tablet (81 mg) by mouth daily     atorvastatin (LIPITOR) 10 MG tablet Take 1 tablet (10 mg) by mouth daily     cetirizine (ZYRTEC) 10 MG tablet Take 1 tablet (10 mg) by mouth daily In AM as needed for rash/itching. Can do benadryl at bedtime.     Cyanocobalamin (B-12 PO)      diltiazem ER (TIAZAC) 360 MG 24 hr ER beaded capsule 1 table daily     folic acid 800 MCG TABS      gabapentin (NEURONTIN) 300 MG capsule Take 300 mg by mouth daily     IRON PO      levothyroxine (SYNTHROID/LEVOTHROID) 112 MCG tablet Take 1 tablet (112 mcg) by mouth daily     lisinopril (ZESTRIL) 20 MG tablet Take 1 tablet (20 mg) by mouth daily     metFORMIN (GLUCOPHAGE-XR) 500 MG 24 hr tablet TAKE TWO TABLETS BY MOUTH EVERY DAY WITH EVENING MEAL     polyethylene glycol (GOLYTELY) 236 g suspension Take 8,000 mLs by mouth See Admin Instructions     blood glucose monitoring (ACCU-CHEK RENY PLUS) test strip Use to test blood sugar 2 times daily or as directed.     blood glucose monitoring (ACCU-CHEK FASTCLIX) lancets Use to test blood sugar 2 times daily or as directed.             Review of Systems:   The review of systems was positive for the following findings.  None.  The remainder of the review of systems was unremarkable.          Physical Exam:   All vitals have been reviewed  not currently breastfeeding.  No intake or output data in the 24 hours ending 02/22/22 0595  SHEENT examination  revealed NC/AT, EOI.  Examination of the chest revealed CTA.  Examination of the heart revealed RRR.  Examination of the abdomen revealed soft, non tender.  The neuromuscular examination was NL.          Data:   All laboratory data reviewed  No results found for any visits on 02/22/22.  -     Devin Chawla MD, FACS

## 2022-02-22 NOTE — DISCHARGE INSTRUCTIONS
[facility]    Home Care Following Endoscopy  Dr. Chawla          Activity:    You have just undergone an endoscopic procedure usually performed with conscious sedation.  Do not work or operate machinery (including a car) for at least 12 hours.      I encourage you to walk and attempt to pass this air as soon as possible.    Diet:    Return to the diet you were on before your procedure but eat lightly for the first 12-24 hours.    Drink plenty of water.    Resume any regular medications unless otherwise advised by your physician.  Please begin any new medication prescribed as a result of your procedure as directed by your physician.    Pain:    You may take Tylenol as needed for pain.  Expected Recovery:    You can expect some mild abdominal fullness and/or discomfort due to the air used to inflate your intestinal tract.     Call Your Physician if You Have:      After Colonoscopy:  o Worsening persisting abdominal pain which is worse with activity.  o Fevers (>101 degrees F), chills or shakes.  o Passage of continued blood with bowel movements.   Any questions or concerns about your recovery, please call [phone] or after hours [phone] Nurse Advice Line.

## 2022-02-22 NOTE — ANESTHESIA CARE TRANSFER NOTE
Patient: Wendy Dodd    Procedure: Procedure(s):  COLONOSCOPY       Diagnosis: Special screening for malignant neoplasms, colon [Z12.11]  Diagnosis Additional Information: No value filed.    Anesthesia Type:   MAC     Note:    Oropharynx: oropharynx clear of all foreign objects and spontaneously breathing  Level of Consciousness: drowsy  Oxygen Supplementation: face mask    Independent Airway: airway patency satisfactory and stable  Dentition: dentition unchanged  Vital Signs Stable: post-procedure vital signs reviewed and stable  Report to RN Given: handoff report given  Patient transferred to: Phase II    Handoff Report: Identifed the Patient, Identified the Reponsible Provider, Reviewed the pertinent medical history, Discussed the surgical course, Reviewed Intra-OP anesthesia mangement and issues during anesthesia, Set expectations for post-procedure period and Allowed opportunity for questions and acknowledgement of understanding      Vitals:  Vitals Value Taken Time   BP     Temp     Pulse     Resp     SpO2         Electronically Signed By: ALINA Solis CRNA  February 22, 2022  12:58 PM

## 2022-02-22 NOTE — ANESTHESIA PREPROCEDURE EVALUATION
Anesthesia Pre-Procedure Evaluation    Patient: Wendy Dodd   MRN: [id number] : 1960        Procedure : Procedure(s):  COLONOSCOPY          Past Medical History:   Diagnosis Date     Cancer (H)      Cerebral infarction (H)      Diabetes (H)      Hypertension      Sleep apnea      Thyroid disease      Type 2 diabetes mellitus without complication, without long-term current use of insulin (H)       Past Surgical History:   Procedure Laterality Date     ABDOMEN SURGERY       APPENDECTOMY       BIOPSY       COLONOSCOPY       ENT SURGERY        Allergies   Allergen Reactions     Penicillins      Sulfa Drugs       Social History     Tobacco Use     Smoking status: Former Smoker     Packs/day: 0.00     Years: 0.00     Pack years: 0.00     Types: Cigarettes, Other     Start date: 1975     Quit date: 2015     Years since quittin.1     Smokeless tobacco: Never Used     Tobacco comment: I use Ecig   Substance Use Topics     Alcohol use: Yes     Alcohol/week: 0.0 standard drinks      Wt Readings from Last 1 Encounters:   02/15/22 102.5 kg (226 lb)        Anesthesia Evaluation   Pt has had prior anesthetic. Type: MAC and General.        ROS/MED HX  ENT/Pulmonary:     (+) sleep apnea, tobacco use, Past use,     Neurologic:     (+) CVA,     Cardiovascular:     (+) hypertension-----Previous cardiac testing   Echo: Date: Results:    Stress Test: Date: Results:    ECG Reviewed: Date: 2021 Results:  Sinus Rhythm   -Nonspecific QRS widening and anterior fascicular block.   Cath: Date: Results:      METS/Exercise Tolerance:     Hematologic:  - neg hematologic  ROS     Musculoskeletal:  - neg musculoskeletal ROS     GI/Hepatic: Comment: Diverticulosis of large intestine    External hemorrhoids     H/O colon polyps     (+) GERD, Asymptomatic on medication,     Renal/Genitourinary:       Endo:     (+) type II DM, Last HgA1c: 7.5, date: 2021, Not using insulin, - not using insulin pump. not previously  admitted for DM/DKA. thyroid problem, hypothyroidism, Obesity,     Psychiatric/Substance Use:  - neg psychiatric ROS     Infectious Disease:  - neg infectious disease ROS     Malignancy:  - neg malignancy ROS     Other:  - neg other ROS          Physical Exam    Airway  airway exam normal      Mallampati: II   TM distance: > 3 FB   Neck ROM: full   Mouth opening: > 3 cm    Respiratory Devices and Support         Dental  no notable dental history         Cardiovascular   cardiovascular exam normal       Rhythm and rate: regular and normal     Pulmonary   pulmonary exam normal        breath sounds clear to auscultation           OUTSIDE LABS:  CBC:   Lab Results   Component Value Date    WBC 6.4 04/07/2021    WBC 7.3 05/14/2010    HGB 14.5 04/07/2021    HGB 15.0 05/14/2010    HCT 44.4 04/07/2021    HCT 44.2 05/14/2010     04/07/2021     05/14/2010     BMP:   Lab Results   Component Value Date     04/07/2021     12/07/2020    POTASSIUM 4.2 04/07/2021    POTASSIUM 4.6 12/07/2020    CHLORIDE 106 04/07/2021    CHLORIDE 103 12/07/2020    CO2 31 04/07/2021    CO2 32 12/07/2020    BUN 20 04/07/2021    BUN 18 12/07/2020    CR 0.71 04/07/2021    CR 0.70 12/07/2020     (H) 04/07/2021     (H) 12/07/2020     COAGS:   Lab Results   Component Value Date    INR 0.86 05/14/2010     POC:   Lab Results   Component Value Date    HCG Negative 05/24/2004     HEPATIC:   Lab Results   Component Value Date    ALBUMIN 3.9 09/04/2018     OTHER:   Lab Results   Component Value Date    A1C 7.5 (H) 11/05/2021    JAYCOB 10.8 (H) 04/07/2021    PHOS 2.8 09/04/2018    TSH 1.77 04/07/2021    T4 1.53 (H) 03/04/2019       Anesthesia Plan    ASA Status:  3   NPO Status:  NPO Appropriate    Anesthesia Type: MAC.     - Reason for MAC: straight local not clinically adequate   Induction: Intravenous, Propofol.   Maintenance: TIVA.        Consents    Anesthesia Plan(s) and associated risks, benefits, and realistic  alternatives discussed. Questions answered and patient/representative(s) expressed understanding.    - Discussed:     - Discussed with:  Patient      - Extended Intubation/Ventilatory Support Discussed: No.      - Patient is DNR/DNI Status: No    Use of blood products discussed: No .     Postoperative Care    Pain management: Oral pain medications.        Comments:    Other Comments: The risks and benefits of anesthesia, and the alternatives where applicable, have been discussed with the patient, and they wish to proceed.            ALINA James CRNA

## 2022-04-06 ENCOUNTER — OFFICE VISIT (OUTPATIENT)
Dept: ALLERGY | Facility: OTHER | Age: 62
End: 2022-04-06
Payer: COMMERCIAL

## 2022-04-06 VITALS
OXYGEN SATURATION: 97 % | WEIGHT: 235 LBS | BODY MASS INDEX: 35.61 KG/M2 | SYSTOLIC BLOOD PRESSURE: 127 MMHG | HEART RATE: 68 BPM | DIASTOLIC BLOOD PRESSURE: 85 MMHG | HEIGHT: 68 IN

## 2022-04-06 DIAGNOSIS — T78.49XA OTHER ALLERGY, INITIAL ENCOUNTER: Primary | ICD-10-CM

## 2022-04-06 DIAGNOSIS — T78.1XXA REACTION TO FOOD, INITIAL ENCOUNTER: ICD-10-CM

## 2022-04-06 LAB
Lab: NORMAL
PERFORMING LABORATORY: NORMAL
SPECIMEN STATUS: NORMAL
TEST NAME: NORMAL

## 2022-04-06 PROCEDURE — 86003 ALLG SPEC IGE CRUDE XTRC EA: CPT | Mod: 90 | Performed by: ALLERGY & IMMUNOLOGY

## 2022-04-06 PROCEDURE — 36415 COLL VENOUS BLD VENIPUNCTURE: CPT | Performed by: ALLERGY & IMMUNOLOGY

## 2022-04-06 PROCEDURE — 86008 ALLG SPEC IGE RECOMB EA: CPT | Mod: 59 | Performed by: ALLERGY & IMMUNOLOGY

## 2022-04-06 PROCEDURE — 86003 ALLG SPEC IGE CRUDE XTRC EA: CPT | Performed by: ALLERGY & IMMUNOLOGY

## 2022-04-06 PROCEDURE — 99244 OFF/OP CNSLTJ NEW/EST MOD 40: CPT | Mod: 25 | Performed by: ALLERGY & IMMUNOLOGY

## 2022-04-06 PROCEDURE — 82785 ASSAY OF IGE: CPT | Performed by: ALLERGY & IMMUNOLOGY

## 2022-04-06 PROCEDURE — 99000 SPECIMEN HANDLING OFFICE-LAB: CPT | Performed by: ALLERGY & IMMUNOLOGY

## 2022-04-06 PROCEDURE — 95004 PERQ TESTS W/ALRGNC XTRCS: CPT | Performed by: ALLERGY & IMMUNOLOGY

## 2022-04-06 RX ORDER — ACETAMINOPHEN 325 MG/1
325-650 TABLET ORAL EVERY 6 HOURS PRN
COMMUNITY
End: 2022-06-21

## 2022-04-06 RX ORDER — IBUPROFEN 200 MG
200 TABLET ORAL EVERY 4 HOURS PRN
COMMUNITY
End: 2022-06-21

## 2022-04-06 ASSESSMENT — ENCOUNTER SYMPTOMS
VOMITING: 0
EYE REDNESS: 0
EYE ITCHING: 0
DIARRHEA: 0
FACIAL SWELLING: 0
ABDOMINAL PAIN: 0
WHEEZING: 0
SINUS PRESSURE: 0
ADENOPATHY: 0
FEVER: 0
NAUSEA: 0
ARTHRALGIAS: 0
HEADACHES: 0
EYE DISCHARGE: 0
ACTIVITY CHANGE: 0
CHILLS: 0
COUGH: 0
RHINORRHEA: 0
CHEST TIGHTNESS: 0
MYALGIAS: 0
SHORTNESS OF BREATH: 0

## 2022-04-06 NOTE — PROGRESS NOTES
On February 13, the patient developed facial flushing and redness associated with diaphoresis.  Her lips and inside of her mouth felt swollen.  The palms and soles had a burning sensation.  Soon after, she had diarrhea.  She took diphenhydramine and went to bed.  Next day, her face was still hot and red all morning.  She continued having abdominal pain for 2 days.  Prior to that, the patient had [employer] for a snack and cashews.      Himanshu Fu MD

## 2022-04-06 NOTE — LETTER
4/6/2022         RE: Wendy Dodd  [address] [facility] [address]        Dear Colleague,    Thank you for referring your patient, Wendy Dodd, to the [facility]. Please see a copy of my visit note below.    SUBJECTIVE:                                                                   Wendy Dodd is a 61 year old female who presents today to our Allergy Clinic at [facility]; She is being seen in consultation at the request of Kristen M Kehr, PA-C, for allergic reaction evaluation.     On February 13, at about 4 pm the patient developed facial flushing and redness associated with diaphoresis, followed by nausea and vomiting. The palms and soles had a burning.  Her lips and inside of her mouth felt swollen. She developed abdominal pain and diarrhea, had approximately 5 episodes. She took diphenhydramine and went to bed.  Next day, her face was still hot and red all morning.  She continued having abdominal pain for 2 days.  Prior to that, the patient had corn beef with mustard on a chaffle (egg and mozzarella cheese) at around 11 am.  At approximately  2:30pm, she had 3 [employer] for a snack and cashews.      Since then, she had eggs, dairy, peanuts, hamburger, roastbeef, and same [employer] since then. She didn't have any tree nuts, mustard, or corn beef. She denies being bitten by a tick within the last year. She denies taking ibuprofen on that day. No alcohol was ingested on that day.       She has never these kind of symptoms before.         Patient Active Problem List   Diagnosis     Advanced directives, counseling/discussion     Diverticulosis of large intestine     External hemorrhoids     History of colonic polyps     Benign essential hypertension     Hypothyroidism, unspecified type     Non morbid obesity due to excess calories     Type 2 diabetes mellitus without complication, without long-term current use of insulin (H)      Esophageal reflux     Morbid obesity (H)       Past Medical History:   Diagnosis Date     Cancer (H)      Cerebral infarction (H)      Diabetes (H)      Hypertension      Sleep apnea      Thyroid disease      Type 2 diabetes mellitus without complication, without long-term current use of insulin (H)       Problem (# of Occurrences) Relation (Name,Age of Onset)    Colon Cancer (1) Father (Josh)    Other Cancer (3) Father (Josh): liver, Mother (Mother), Brother (merlin)    Skin Cancer (1) Sister       Negative family history of: Asthma, Allergic rhinitis        Past Surgical History:   Procedure Laterality Date     ABDOMEN SURGERY       APPENDECTOMY       BIOPSY       COLONOSCOPY       COLONOSCOPY N/A 2022    Procedure: COLONOSCOPY;  Surgeon: Devin Chawla MD;  Location:  GI     ENT SURGERY       Social History     Socioeconomic History     Marital status:      Spouse name: None     Number of children: None     Years of education: None     Highest education level: None   Occupational History     Comment: [employer]   Tobacco Use     Smoking status: Former Smoker     Packs/day: 0.00     Years: 0.00     Pack years: 0.00     Types: Cigarettes, Other     Start date: 1975     Quit date: 2015     Years since quittin.2     Smokeless tobacco: Never Used     Tobacco comment: I use Ecig   Vaping Use     Vaping Use: Every day     Substances: Nicotine, Flavoring     Devices: Refillable tank   Substance and Sexual Activity     Alcohol use: Yes     Alcohol/week: 0.0 standard drinks     Drug use: No     Sexual activity: Not Currently     Partners: Male     Birth control/protection: None   Other Topics Concern     Parent/sibling w/ CABG, MI or angioplasty before 65F 55M? No   Social History Narrative    ENVIRONMENTAL HISTORY: [place] The [place] is heated with a forced air and gas furnace. They do have central air conditioning. The patient's bedroom is  furnished with carpeting in bedroom.  Pets inside the house include 1 cat(s). There is history of cockroach or mice infestation. There is/are 0 smokers in the house.  The house does not have a damp basement.      Social Determinants of Health     Financial Resource Strain: Low Risk      Difficulty of Paying Living Expenses: Not hard at all   Food Insecurity: No Food Insecurity     Worried About Running Out of Food in the Last Year: Never true     Ran Out of Food in the Last Year: Never true   Transportation Needs: No Transportation Needs     Lack of Transportation (Medical): No     Lack of Transportation (Non-Medical): No   Physical Activity: Not on file   Stress: Not on file   Social Connections: Not on file   Intimate Partner Violence: Unknown     Fear of Current or Ex-Partner: No     Emotionally Abused: Not on file     Physically Abused: Not on file     Sexually Abused: Not on file   Housing Stability: Not on file           Review of Systems   Constitutional: Negative for activity change, chills and fever.   HENT: Negative for congestion, ear discharge, facial swelling, nosebleeds, postnasal drip, rhinorrhea, sinus pressure and sneezing.    Eyes: Negative for discharge, redness and itching.   Respiratory: Negative for cough, chest tightness, shortness of breath and wheezing.    Cardiovascular: Negative for chest pain.   Gastrointestinal: Negative for abdominal pain, diarrhea, nausea and vomiting.   Musculoskeletal: Negative for arthralgias and myalgias.   Skin: Negative for rash.   Allergic/Immunologic: Negative for environmental allergies.   Neurological: Negative for headaches.   Hematological: Negative for adenopathy.   Psychiatric/Behavioral: Negative for behavioral problems and self-injury.           Current Outpatient Medications:      acetaminophen (TYLENOL) 325 MG tablet, Take 325-650 mg by mouth every 6 hours as needed for mild pain, Disp: , Rfl:      aspirin 81 MG tablet, Take 1 tablet (81 mg) by mouth  "daily, Disp: 30 tablet, Rfl: 11     atorvastatin (LIPITOR) 10 MG tablet, Take 1 tablet (10 mg) by mouth daily, Disp: 90 tablet, Rfl: 1     blood glucose monitoring (ACCU-CHEK RENY PLUS) test strip, Use to test blood sugar 2 times daily or as directed., Disp: 200 strip, Rfl: 11     blood glucose monitoring (ACCU-CHEK FASTCLIX) lancets, Use to test blood sugar 2 times daily or as directed., Disp: 102 each, Rfl: 11     Cyanocobalamin (B-12 PO), , Disp: , Rfl:      diltiazem ER (TIAZAC) 360 MG 24 hr ER beaded capsule, 1 table daily, Disp: 90 capsule, Rfl: 1     folic acid 800 MCG TABS, , Disp: , Rfl:      gabapentin (NEURONTIN) 300 MG capsule, Take 300 mg by mouth daily, Disp: , Rfl:      ibuprofen (ADVIL/MOTRIN) 200 MG tablet, Take 200 mg by mouth every 4 hours as needed for mild pain, Disp: , Rfl:      IRON PO, , Disp: , Rfl:      levothyroxine (SYNTHROID/LEVOTHROID) 112 MCG tablet, Take 1 tablet (112 mcg) by mouth daily, Disp: 90 tablet, Rfl: 3     lisinopril (ZESTRIL) 20 MG tablet, Take 1 tablet (20 mg) by mouth daily, Disp: 90 tablet, Rfl: 1     metFORMIN (GLUCOPHAGE-XR) 500 MG 24 hr tablet, TAKE TWO TABLETS BY MOUTH EVERY DAY WITH EVENING MEAL, Disp: 180 tablet, Rfl: 1     polyethylene glycol (GOLYTELY) 236 g suspension, Take 8,000 mLs by mouth See Admin Instructions, Disp: 8000 mL, Rfl: 0  Immunization History   Administered Date(s) Administered     COVID-19,PF,Pfizer (12+ Yrs) 08/28/2021, 09/28/2021     Pneumococcal 23 valent 04/20/2017     TDAP Vaccine (Adacel) 07/28/2014     Td (Adult), Adsorbed 05/13/2004     Allergies   Allergen Reactions     Penicillins      Sulfa Drugs Unknown     OBJECTIVE:                                                                 /85   Pulse 68   Ht 1.715 m (5' 7.5\")   Wt 106.6 kg (235 lb)   SpO2 97%   BMI 36.26 kg/m          Physical Exam  Vitals and nursing note reviewed.   Constitutional:       General: She is not in acute distress.     Appearance: She is not " ill-appearing, toxic-appearing or diaphoretic.   HENT:      Head: Normocephalic and atraumatic.      Right Ear: Tympanic membrane, ear canal and external ear normal.      Left Ear: Tympanic membrane, ear canal and external ear normal.      Nose: No congestion or rhinorrhea.      Right Turbinates: Not enlarged, swollen or pale.      Left Turbinates: Not enlarged, swollen or pale.      Mouth/Throat:      Lips: Pink.      Mouth: Mucous membranes are moist.      Pharynx: Oropharynx is clear. No pharyngeal swelling, oropharyngeal exudate, posterior oropharyngeal erythema or uvula swelling.   Eyes:      General:         Right eye: No discharge.         Left eye: No discharge.      Conjunctiva/sclera: Conjunctivae normal.   Cardiovascular:      Rate and Rhythm: Normal rate and regular rhythm.      Heart sounds: Normal heart sounds.   Pulmonary:      Effort: Pulmonary effort is normal. No respiratory distress.      Breath sounds: Normal breath sounds and air entry. No stridor, decreased air movement or transmitted upper airway sounds. No decreased breath sounds, wheezing, rhonchi or rales.   Lymphadenopathy:      Cervical: No cervical adenopathy.   Skin:     General: Skin is warm.      Capillary Refill: Capillary refill takes less than 2 seconds.   Neurological:      Mental Status: She is alert and oriented to person, place, and time.   Psychiatric:         Mood and Affect: Mood normal.         Behavior: Behavior normal.         WORKUP:     FOOD ALLERGEN PERCUTANEOUS SKIN TESTING  [employer]  4/6/2022   Consent Y   Ordering Physician Nickie   Interpreting Physician Nickie   Testing Technician Rand   Location Back   Time start:  7:50 AM   Time End:  8:05 AM   Positive Control: Histatrol*ALK 1 mg/ml 7/20   Negative Control: 50% Glycerin**[place] Brian 0   [place]  1:20 (W/F in millimeters) 0   Cashew  1:20 (W/F in millimeters) 0   Pecan  1:20 (W/F in millimeters) 0   Pistachio*ALK (1:10 w/v) 0   [place]less[place] 1:20 (W/F in  millimeters) 0   Hazelnut ([place])  1:20 (W/F in millimeters) 0   Brazil Nut  1:20 (W/F in millimeters) 0   Beef 1:20 (W/F in millimeters) 0      My interpretation: SPT for beef and tree nuts was negative with appropriate responses to positive and negative controls.    ASSESSMENT/PLAN:    Other allergy, initial encounter  Reaction to food, initial encounter    For an IgE mediated reaction to mustard, symptoms happened rather late, more than 3 hours after ingestion.  Negative skin test results for tree nuts are reassuring. I will order serum IgE for tree nuts. If negative, I anticipate an oral food challenge test in office settings with cashews only.  -I also ordered serum IgE for beef, although she was able to reintroduce hamburgers in her diet.  -Ordered serum IgE for mustard.  -Ordered serum IgE for alpha-gal since timing after eating corn beef could be associated with alpha-gal allergy. With alpha-gal allergy, some patients can eat certain types of red meat in between without a problem.  -For now, I recommend the patient continue avoiding mustard, tree nuts, and all red meat.  Anaphylaxis action plan was reviewed and provided.      - Galactose alpha 1 3 Galactose IgE  - IgE  - Other Laboratory; any; allergen mustard IgE (Laboratory Miscellaneous Order)  - Allergen beef IgE  - Allergen almonds IgE  - Allergen brazil nut IgE  - Allergen Brazil Nut rBer e 1 IgE  - Allergen cashew IgE  - Allergen Cashew nut rAna o 3 IgE  - Allergen hazelnut IgE  - Allergen macadamia nut IgE  - Allergen pecan nut IgE  - Allergen pistachio nut IgE  - Allergen [place] rJug r 1 IgE  - Allergen [place] rJug r 3 IgE  - Allergen walnuts IgE      - ALLERGY SKIN TESTS,ALLERGENS       Return depending on the blood test results.    Thank you for allowing us to participate in the care of this patient. Please feel free to contact us if there are any questions or concerns about the patient.    Disclaimer: This note consists of symbols derived from  keyboarding, dictation and/or voice recognition software. As a result, there may be errors in the script that have gone undetected. Please consider this when interpreting information found in this chart.    Himanshu Fu MD, FAAAAI, FACAAI  Allergy, Asthma and Immunology     [facility]       Again, thank you for allowing me to participate in the care of your patient.        Sincerely,        Himanshu Fu MD

## 2022-04-06 NOTE — LETTER
ANAPHYLAXIS ALLERGY PLAN    Name: Wendy Dodd      :  1960    Allergy to:  Work up for mustard, red meat, and tree nut allergy  Weight: 235 lbs 0 oz           Asthma:  No      Do not depend on antihistamines or inhalers (bronchodilators) to treat a severe reaction; USE EPINEPHRINE      MEDICATIONS/DOSES  Epinephrine:  EpiPen/Adrenaclick/Auvi-Q  Epinephrine dose:  0.3 mg IM  Antihistamine:  Zyrtec (Cetirizine)  Antihistamine dose:  20 mg   Other (e.g., inhaler-bronchodilator if wheezing):  none       ANAPHYLAXIS ALLERGY PLAN (Page 2)  Patient:  Wendy Dodd  :  1960         Electronically signed on 2022 by:  Himanshu Fu MD  Parent/Guardian Authorization Signature:  ___________________________ Date:    FORM PROVIDED COURTESY OF FOOD ALLERGY RESEARCH & EDUCATION (FARE) ([email or web address]) 2017

## 2022-04-06 NOTE — NURSING NOTE
RN reviewed Anaphylaxis Action Plan with patient. Educated on the symptoms and treatment of anaphylaxis. Went through the different ways that a reaction can present, and the body systems that it can affect. Patient verbalized understanding.     Writer demonstrated how to use an EpiPen auto-injector.  Patient instructed to form a fist around the auto-injector, remove blue safety release by pulling straight up, then firmly push orange tip against outer thigh so it clicks, holding for 3 seconds.  Patient advised that once used, needle will not be exposed, as orange tip extends.  Patient advised to call 911 or go to emergency department after epi-pen use for further monitoring.       Rand Ashton RN

## 2022-04-06 NOTE — PATIENT INSTRUCTIONS
Continue avoidance of mustard and tree nuts.  Avoidance of red meat.  Get the blood work done.    Allergy Staff Appt Hours Shot Hours Locations    Physician     Himanshu Fu MD       Support Staff     Rand QUINTANA, BRADY QUINTANA CMA  Tuesday:   [place] :  [place]: :         [place]:  [place]        Tuesday: : : : : [facility]       Tues & Wed:        Mon & Thurs:        Fri:          [facility]  [address] [place] [address]  Appt Line: [phone]    [facility]  [address] [place] [address]  Appt Line: [phone]    Pulmonary Function Scheduling:  [place]: [phone]  [place]: [phone]  [phone]     Prescription Assistance    If you need assistance with your prescriptions (cost, coverage, etc) please contact: [facility] Prescription Assistance Program [phone]    Important Scheduling Information    Appointments for skin testing: Appointment will last approximately 45 minutes.  Please call the appointment line for your clinic to schedule.  Discontinue oral antihistamines 7 days prior to the appointment.  Discontinue nasal and ocular antihistamines 4 days prior to appointment.    Appointments for challenges (oral food challenge, penicillin testing, aspirin desensitization) If your provider instructs you to that this additional testing is indicated, it will need to be scheduled directly through the allergy department.  Please contact them via [employer] or by calling the clinic and asking to speak with the allergy team.  They will provide additional information and instructions for the appointment.  Discontinue oral antihistamines 7 days prior to the appointment.  Discontinue nasal and ocular antihistamines 4 days prior to the appointment.    Thank you for trusting us  with your care. Please feel free to contact us with any questions or concerns you may have.

## 2022-04-06 NOTE — PROGRESS NOTES
SUBJECTIVE:                                                                   Wendy Dodd is a 61 year old female who presents today to our Allergy Clinic at [facility]; She is being seen in consultation at the request of Kristen M Kehr, PA-C, for allergic reaction evaluation.     On February 13, at about 4 pm the patient developed facial flushing and redness associated with diaphoresis, followed by nausea and vomiting. The palms and soles had a burning.  Her lips and inside of her mouth felt swollen. She developed abdominal pain and diarrhea, had approximately 5 episodes. She took diphenhydramine and went to bed.  Next day, her face was still hot and red all morning.  She continued having abdominal pain for 2 days.  Prior to that, the patient had corn beef with mustard on a chaffle (egg and mozzarella cheese) at around 11 am.  At approximately  2:30pm, she had 3 Slim Lei's for a snack and cashews.      Since then, she had eggs, dairy, peanuts, hamburger, roastbeef, and same Slim Lei since then. She didn't have any tree nuts, mustard, or corn beef. She denies being bitten by a tick within the last year. She denies taking ibuprofen on that day. No alcohol was ingested on that day.       She has never these kind of symptoms before.         Patient Active Problem List   Diagnosis     Advanced directives, counseling/discussion     Diverticulosis of large intestine     External hemorrhoids     History of colonic polyps     Benign essential hypertension     Hypothyroidism, unspecified type     Non morbid obesity due to excess calories     Type 2 diabetes mellitus without complication, without long-term current use of insulin (H)     Esophageal reflux     Morbid obesity (H)       Past Medical History:   Diagnosis Date     Cancer (H)      Cerebral infarction (H)      Diabetes (H)      Hypertension      Sleep apnea      Thyroid disease      Type 2 diabetes mellitus without complication,  without long-term current use of insulin (H)       Problem (# of Occurrences) Relation (Name,Age of Onset)    Colon Cancer (1) Father (Josh)    Other Cancer (3) Father (Josh): liver, Mother (Mother), Brother (merlin)    Skin Cancer (1) Sister       Negative family history of: Asthma, Allergic rhinitis        Past Surgical History:   Procedure Laterality Date     ABDOMEN SURGERY       APPENDECTOMY       BIOPSY       COLONOSCOPY       COLONOSCOPY N/A 2022    Procedure: COLONOSCOPY;  Surgeon: Devin Chawla MD;  Location:  GI     ENT SURGERY       Social History     Socioeconomic History     Marital status:      Spouse name: None     Number of children: None     Years of education: None     Highest education level: None   Occupational History     Comment: [employer]   Tobacco Use     Smoking status: Former Smoker     Packs/day: 0.00     Years: 0.00     Pack years: 0.00     Types: Cigarettes, Other     Start date: 1975     Quit date: 2015     Years since quittin.2     Smokeless tobacco: Never Used     Tobacco comment: I use Ecig   Vaping Use     Vaping Use: Every day     Substances: Nicotine, Flavoring     Devices: Refillable tank   Substance and Sexual Activity     Alcohol use: Yes     Alcohol/week: 0.0 standard drinks     Drug use: No     Sexual activity: Not Currently     Partners: Male     Birth control/protection: None   Other Topics Concern     Parent/sibling w/ CABG, MI or angioplasty before 65F 55M? No   Social History Narrative    ENVIRONMENTAL HISTORY: [place] The home is heated with a forced air and gas furnace. They do have central air conditioning. The patient's bedroom is furnished with carpeting in bedroom.  Pets inside the house include 1 cat(s). There is history of cockroach or mice infestation. There is/are 0 smokers in the house.  The house does not have a damp basement.      Social Determinants of Health     Financial  Resource Strain: Low Risk      Difficulty of Paying Living Expenses: Not hard at all   Food Insecurity: No Food Insecurity     Worried About Running Out of Food in the Last Year: Never true     Ran Out of Food in the Last Year: Never true   Transportation Needs: No Transportation Needs     Lack of Transportation (Medical): No     Lack of Transportation (Non-Medical): No   Physical Activity: Not on file   Stress: Not on file   Social Connections: Not on file   Intimate Partner Violence: Unknown     Fear of Current or Ex-Partner: No     Emotionally Abused: Not on file     Physically Abused: Not on file     Sexually Abused: Not on file   Housing Stability: Not on file           Review of Systems   Constitutional: Negative for activity change, chills and fever.   HENT: Negative for congestion, ear discharge, facial swelling, nosebleeds, postnasal drip, rhinorrhea, sinus pressure and sneezing.    Eyes: Negative for discharge, redness and itching.   Respiratory: Negative for cough, chest tightness, shortness of breath and wheezing.    Cardiovascular: Negative for chest pain.   Gastrointestinal: Negative for abdominal pain, diarrhea, nausea and vomiting.   Musculoskeletal: Negative for arthralgias and myalgias.   Skin: Negative for rash.   Allergic/Immunologic: Negative for environmental allergies.   Neurological: Negative for headaches.   Hematological: Negative for adenopathy.   Psychiatric/Behavioral: Negative for behavioral problems and self-injury.           Current Outpatient Medications:      acetaminophen (TYLENOL) 325 MG tablet, Take 325-650 mg by mouth every 6 hours as needed for mild pain, Disp: , Rfl:      aspirin 81 MG tablet, Take 1 tablet (81 mg) by mouth daily, Disp: 30 tablet, Rfl: 11     atorvastatin (LIPITOR) 10 MG tablet, Take 1 tablet (10 mg) by mouth daily, Disp: 90 tablet, Rfl: 1     blood glucose monitoring (ACCU-CHEK RENY PLUS) test strip, Use to test blood sugar 2 times daily or as directed.,  "Disp: 200 strip, Rfl: 11     blood glucose monitoring (ACCU-CHEK FASTCLIX) lancets, Use to test blood sugar 2 times daily or as directed., Disp: 102 each, Rfl: 11     Cyanocobalamin (B-12 PO), , Disp: , Rfl:      diltiazem ER (TIAZAC) 360 MG 24 hr ER beaded capsule, 1 table daily, Disp: 90 capsule, Rfl: 1     folic acid 800 MCG TABS, , Disp: , Rfl:      gabapentin (NEURONTIN) 300 MG capsule, Take 300 mg by mouth daily, Disp: , Rfl:      ibuprofen (ADVIL/MOTRIN) 200 MG tablet, Take 200 mg by mouth every 4 hours as needed for mild pain, Disp: , Rfl:      IRON PO, , Disp: , Rfl:      levothyroxine (SYNTHROID/LEVOTHROID) 112 MCG tablet, Take 1 tablet (112 mcg) by mouth daily, Disp: 90 tablet, Rfl: 3     lisinopril (ZESTRIL) 20 MG tablet, Take 1 tablet (20 mg) by mouth daily, Disp: 90 tablet, Rfl: 1     metFORMIN (GLUCOPHAGE-XR) 500 MG 24 hr tablet, TAKE TWO TABLETS BY MOUTH EVERY DAY WITH EVENING MEAL, Disp: 180 tablet, Rfl: 1     polyethylene glycol (GOLYTELY) 236 g suspension, Take 8,000 mLs by mouth See Admin Instructions, Disp: 8000 mL, Rfl: 0  Immunization History   Administered Date(s) Administered     COVID-19,PF,Pfizer (12+ Yrs) 08/28/2021, 09/28/2021     Pneumococcal 23 valent 04/20/2017     TDAP Vaccine (Adacel) 07/28/2014     Td (Adult), Adsorbed 05/13/2004     Allergies   Allergen Reactions     Penicillins      Sulfa Drugs Unknown     OBJECTIVE:                                                                 /85   Pulse 68   Ht 1.715 m (5' 7.5\")   Wt 106.6 kg (235 lb)   SpO2 97%   BMI 36.26 kg/m          Physical Exam  Vitals and nursing note reviewed.   Constitutional:       General: She is not in acute distress.     Appearance: She is not ill-appearing, toxic-appearing or diaphoretic.   HENT:      Head: Normocephalic and atraumatic.      Right Ear: Tympanic membrane, ear canal and external ear normal.      Left Ear: Tympanic membrane, ear canal and external ear normal.      Nose: No congestion " or rhinorrhea.      Right Turbinates: Not enlarged, swollen or pale.      Left Turbinates: Not enlarged, swollen or pale.      Mouth/Throat:      Lips: Pink.      Mouth: Mucous membranes are moist.      Pharynx: Oropharynx is clear. No pharyngeal swelling, oropharyngeal exudate, posterior oropharyngeal erythema or uvula swelling.   Eyes:      General:         Right eye: No discharge.         Left eye: No discharge.      Conjunctiva/sclera: Conjunctivae normal.   Cardiovascular:      Rate and Rhythm: Normal rate and regular rhythm.      Heart sounds: Normal heart sounds.   Pulmonary:      Effort: Pulmonary effort is normal. No respiratory distress.      Breath sounds: Normal breath sounds and air entry. No stridor, decreased air movement or transmitted upper airway sounds. No decreased breath sounds, wheezing, rhonchi or rales.   Lymphadenopathy:      Cervical: No cervical adenopathy.   Skin:     General: Skin is warm.      Capillary Refill: Capillary refill takes less than 2 seconds.   Neurological:      Mental Status: She is alert and oriented to person, place, and time.   Psychiatric:         Mood and Affect: Mood normal.         Behavior: Behavior normal.         WORKUP:     FOOD ALLERGEN PERCUTANEOUS SKIN TESTING  [facility]  4/6/2022   Consent Y   Ordering Physician Nickie   Interpreting Physician JUSTINOnaren   Testing Technician Rand   Location Back   Time start:  7:50 AM   Time End:  8:05 AM   Positive Control: Histatrol*ALK 1 mg/ml 7/20   Negative Control: 50% Glycerin**Baltazar Brian 0   Marble  1:20 (W/F in millimeters) 0   Cashew  1:20 (W/F in millimeters) 0   Pecan  1:20 (W/F in millimeters) 0   Pistachio*ALK (1:10 w/v) 0   Racine 1:20 (W/F in millimeters) 0   Hazelnut ([place])  1:20 (W/F in millimeters) 0   Brazil Nut  1:20 (W/F in millimeters) 0   Beef 1:20 (W/F in millimeters) 0      My interpretation: SPT for beef and tree nuts was negative with appropriate responses to positive and negative  controls.    ASSESSMENT/PLAN:    Other allergy, initial encounter  Reaction to food, initial encounter    For an IgE mediated reaction to mustard, symptoms happened rather late, more than 3 hours after ingestion.  Negative skin test results for tree nuts are reassuring. I will order serum IgE for tree nuts. If negative, I anticipate an oral food challenge test in office settings with cashews only.  -I also ordered serum IgE for beef, although she was able to reintroduce hamburgers in her diet.  -Ordered serum IgE for mustard.  -Ordered serum IgE for alpha-gal since timing after eating corn beef could be associated with alpha-gal allergy. With alpha-gal allergy, some patients can eat certain types of red meat in between without a problem.  -For now, I recommend the patient continue avoiding mustard, tree nuts, and all red meat.  Anaphylaxis action plan was reviewed and provided.      - Galactose alpha 1 3 Galactose IgE  - IgE  - Other Laboratory; any; allergen mustard IgE (Laboratory Miscellaneous Order)  - Allergen beef IgE  - Allergen almonds IgE  - Allergen brazil nut IgE  - Allergen Brazil Nut rBer e 1 IgE  - Allergen cashew IgE  - Allergen Cashew nut rAna o 3 IgE  - Allergen hazelnut IgE  - Allergen macadamia nut IgE  - Allergen pecan nut IgE  - Allergen pistachio nut IgE  - Allergen [place] rJug r 1 IgE  - Allergen [place] rJug r 3 IgE  - Allergen walnuts IgE      - ALLERGY SKIN TESTS,ALLERGENS       Return depending on the blood test results.    Thank you for allowing us to participate in the care of this patient. Please feel free to contact us if there are any questions or concerns about the patient.    Disclaimer: This note consists of symbols derived from keyboarding, dictation and/or voice recognition software. As a result, there may be errors in the script that have gone undetected. Please consider this when interpreting information found in this chart.    Himanshu Fu MD, FAAAAI, FACAAI  Allergy, Asthma  and Immunology     [facility]

## 2022-04-07 LAB
ALLERGEN CASHEW NUT RANA O 3 IGE: <0.1 KU(A)/L
ALLERGEN WALNUT RJUG R 1 IGE: <0.1 KU(A)/L
ALMOND IGE QN: <0.1 KU(A)/L
BEEF IGE QN: <0.1 KU(A)/L
BRAZIL NUT (RBER E) 1 IGE QN: <0.1 KU(A)/L
BRAZIL NUT IGE QN: <0.1 KU(A)/L
CASHEW NUT IGE QN: <0.1 KU(A)/L
ENGLISH WALNUT (RJUG R) 3 IGE QN: <0.1 KU(A)/L
HAZELNUT IGE QN: <0.1 KU(A)/L
IGE SERPL-ACNC: 27 KU/L (ref 0–114)
MACADAMIA IGE QN: <0.1 KU(A)/L
PECAN/HICK NUT IGE QN: <0.1 KU(A)/L
PISTACHIO IGE QN: <0.1 KU(A)/L
WALNUT IGE QN: <0.1 KU(A)/L

## 2022-04-09 LAB
ALPHA-GAL IGE QN: <0.1 KU/L
DEPRECATED MISC ALLERGEN IGE RAST QL: NORMAL
MISCELLANEOUS TEST 1 (ARUP): NORMAL

## 2022-04-10 NOTE — RESULT ENCOUNTER NOTE
[employer] message sent:     Total serum IgE is within normal limits.  Serum IgE for alpha gal is negative.  It suggest that the patient can restart eating red meat without a problem.  Negative serum IgE for mustard.  Considering that mustard was ingested at around 11 AM, I doubt that it was the culprit for a reaction that happened at 4 PM.  I believe the patient should be able to reintroduce mustard back in her diet at home.    Negative serum IgE for tree nuts.  -Recommend oral food challenge test with cashews in the office settings.

## 2022-05-15 DIAGNOSIS — E11.9 TYPE 2 DIABETES MELLITUS WITHOUT COMPLICATION, WITHOUT LONG-TERM CURRENT USE OF INSULIN (H): ICD-10-CM

## 2022-05-15 NOTE — LETTER
May 17, 2022    Wendy oDdd  [address]   [place] [address]    Dear Wendy,       We recently received a refill request for metFORMIN (GLUCOPHAGE-XR) 500 MG 24 hr tablet.  We have refilled this for a one time 90 day supply only because you are due for a:    Diabetic eye exam      Please call at your earliest convenience so that there will not be a delay with your future refills.          Thank you,   Your [facility] Team/  [phone]

## 2022-05-17 RX ORDER — METFORMIN HCL 500 MG
TABLET, EXTENDED RELEASE 24 HR ORAL
Qty: 180 TABLET | Refills: 0 | Status: SHIPPED | OUTPATIENT
Start: 2022-05-17 | End: 2022-06-21

## 2022-05-17 NOTE — TELEPHONE ENCOUNTER
"Routing refill request to provider for review/approval because:  Requested Prescriptions   Pending Prescriptions Disp Refills    metFORMIN (GLUCOPHAGE XR) 500 MG 24 hr tablet [Pharmacy Med Name: METFORMIN HCL ER 500MG TB24] 180 tablet 1     Sig: TAKE TWO TABLETS BY MOUTH EVERY DAY WITH EVENING MEAL        Biguanide Agents Failed - 5/15/2022  1:14 PM        Failed - Patient has documented A1c within the specified period of time.     If HgbA1C is 8 or greater, it needs to be on file within the past 3 months.  If less than 8, must be on file within the past 6 months.     Recent Labs   Lab Test 11/05/21  1117   A1C 7.5*             Failed - Patient's CR is NOT>1.4 OR Patient's EGFR is NOT<45 within past 12 mos.       Recent Labs   Lab Test 04/07/21  0936   GFRESTIMATED >90   GFRESTBLACK >90       Recent Labs   Lab Test 04/07/21  0936   CR 0.71             Passed - Patient is age 10 or older        Passed - Patient does NOT have a diagnosis of CHF.        Passed - Medication is active on med list        Passed - Patient is not pregnant        Passed - Patient has not had a positive pregnancy test within the past 12 mos.         Passed - Recent (6 mo) or future (30 days) visit within the authorizing provider's specialty     Patient had office visit in the last 6 months or has a visit in the next 30 days with authorizing provider or within the authorizing provider's specialty.  See \"Patient Info\" tab in inbasket, or \"Choose Columns\" in Meds & Orders section of the refill encounter.                    Gabrielle PRINGLE, RN        "

## 2022-05-17 NOTE — TELEPHONE ENCOUNTER
Please contact patient to remind that she is due for Diabetes Eye exam and if she has completed, then please abstract external information of where and when it was done into her chart.   I have placed the referral for Optometry services if she would like to get the eye exam done here.   Kristen Kehr PA-C

## 2022-05-23 ENCOUNTER — TRANSFERRED RECORDS (OUTPATIENT)
Dept: HEALTH INFORMATION MANAGEMENT | Facility: CLINIC | Age: 62
End: 2022-05-23

## 2022-05-23 LAB — RETINOPATHY: NORMAL

## 2022-06-02 ENCOUNTER — ANCILLARY PROCEDURE (OUTPATIENT)
Dept: MAMMOGRAPHY | Facility: CLINIC | Age: 62
End: 2022-06-02
Attending: PHYSICIAN ASSISTANT
Payer: COMMERCIAL

## 2022-06-02 DIAGNOSIS — Z12.31 VISIT FOR SCREENING MAMMOGRAM: ICD-10-CM

## 2022-06-02 PROCEDURE — 77067 SCR MAMMO BI INCL CAD: CPT | Mod: TC | Performed by: RADIOLOGY

## 2022-06-05 ENCOUNTER — HEALTH MAINTENANCE LETTER (OUTPATIENT)
Age: 62
End: 2022-06-05

## 2022-06-21 ENCOUNTER — OFFICE VISIT (OUTPATIENT)
Dept: FAMILY MEDICINE | Facility: CLINIC | Age: 62
End: 2022-06-21
Payer: COMMERCIAL

## 2022-06-21 VITALS
OXYGEN SATURATION: 97 % | HEART RATE: 76 BPM | SYSTOLIC BLOOD PRESSURE: 127 MMHG | DIASTOLIC BLOOD PRESSURE: 82 MMHG | TEMPERATURE: 96.7 F | WEIGHT: 233 LBS | BODY MASS INDEX: 35.95 KG/M2

## 2022-06-21 DIAGNOSIS — E66.01 MORBID OBESITY (H): ICD-10-CM

## 2022-06-21 DIAGNOSIS — E11.9 TYPE 2 DIABETES MELLITUS WITHOUT COMPLICATION, WITHOUT LONG-TERM CURRENT USE OF INSULIN (H): Primary | ICD-10-CM

## 2022-06-21 DIAGNOSIS — I10 BENIGN ESSENTIAL HYPERTENSION: ICD-10-CM

## 2022-06-21 DIAGNOSIS — Z23 HIGH PRIORITY FOR 2019-NCOV VACCINE: ICD-10-CM

## 2022-06-21 LAB
ANION GAP SERPL CALCULATED.3IONS-SCNC: 7 MMOL/L (ref 3–14)
BUN SERPL-MCNC: 14 MG/DL (ref 7–30)
CALCIUM SERPL-MCNC: 10.3 MG/DL (ref 8.5–10.1)
CHLORIDE BLD-SCNC: 106 MMOL/L (ref 94–109)
CHOLEST SERPL-MCNC: 130 MG/DL
CO2 SERPL-SCNC: 28 MMOL/L (ref 20–32)
CREAT SERPL-MCNC: 0.73 MG/DL (ref 0.52–1.04)
FASTING STATUS PATIENT QL REPORTED: NO
GFR SERPL CREATININE-BSD FRML MDRD: >90 ML/MIN/1.73M2
GLUCOSE BLD-MCNC: 161 MG/DL (ref 70–99)
HBA1C MFR BLD: 8.1 % (ref 0–5.6)
HDLC SERPL-MCNC: 49 MG/DL
LDLC SERPL CALC-MCNC: 42 MG/DL
NONHDLC SERPL-MCNC: 81 MG/DL
POTASSIUM BLD-SCNC: 4.6 MMOL/L (ref 3.4–5.3)
SODIUM SERPL-SCNC: 141 MMOL/L (ref 133–144)
TRIGL SERPL-MCNC: 194 MG/DL

## 2022-06-21 PROCEDURE — 80061 LIPID PANEL: CPT | Performed by: PHYSICIAN ASSISTANT

## 2022-06-21 PROCEDURE — 36415 COLL VENOUS BLD VENIPUNCTURE: CPT | Performed by: PHYSICIAN ASSISTANT

## 2022-06-21 PROCEDURE — 83036 HEMOGLOBIN GLYCOSYLATED A1C: CPT | Performed by: PHYSICIAN ASSISTANT

## 2022-06-21 PROCEDURE — 80048 BASIC METABOLIC PNL TOTAL CA: CPT | Performed by: PHYSICIAN ASSISTANT

## 2022-06-21 PROCEDURE — 91305 COVID-19,PF,PFIZER (12+ YRS): CPT | Performed by: PHYSICIAN ASSISTANT

## 2022-06-21 PROCEDURE — 99214 OFFICE O/P EST MOD 30 MIN: CPT | Mod: 25 | Performed by: PHYSICIAN ASSISTANT

## 2022-06-21 PROCEDURE — 0054A COVID-19,PF,PFIZER (12+ YRS): CPT | Performed by: PHYSICIAN ASSISTANT

## 2022-06-21 RX ORDER — LISINOPRIL 20 MG/1
20 TABLET ORAL DAILY
Qty: 90 TABLET | Refills: 1 | Status: SHIPPED | OUTPATIENT
Start: 2022-06-21 | End: 2022-12-20

## 2022-06-21 RX ORDER — METFORMIN HCL 500 MG
TABLET, EXTENDED RELEASE 24 HR ORAL
Qty: 180 TABLET | Refills: 1 | Status: SHIPPED | OUTPATIENT
Start: 2022-06-21 | End: 2022-06-21

## 2022-06-21 RX ORDER — ATORVASTATIN CALCIUM 10 MG/1
10 TABLET, FILM COATED ORAL DAILY
Qty: 90 TABLET | Refills: 1 | Status: SHIPPED | OUTPATIENT
Start: 2022-06-21 | End: 2022-12-20

## 2022-06-21 RX ORDER — METFORMIN HCL 500 MG
TABLET, EXTENDED RELEASE 24 HR ORAL
Qty: 360 TABLET | Refills: 1 | Status: SHIPPED | OUTPATIENT
Start: 2022-06-21 | End: 2023-01-30

## 2022-06-21 RX ORDER — DILTIAZEM HYDROCHLORIDE 360 MG/1
CAPSULE, EXTENDED RELEASE ORAL
Qty: 90 CAPSULE | Refills: 1 | Status: SHIPPED | OUTPATIENT
Start: 2022-06-21 | End: 2023-01-30

## 2022-06-21 ASSESSMENT — PAIN SCALES - GENERAL: PAINLEVEL: NO PAIN (0)

## 2022-06-21 NOTE — NURSING NOTE
"Chief Complaint   Patient presents with     Diabetes       Initial /82   Pulse 76   Temp (!) 96.7  F (35.9  C) (Tympanic)   Wt 105.7 kg (233 lb)   SpO2 97%   BMI 35.95 kg/m   Estimated body mass index is 35.95 kg/m  as calculated from the following:    Height as of 4/6/22: 1.715 m (5' 7.5\").    Weight as of this encounter: 105.7 kg (233 lb).  Medication Reconciliation: complete    STEPHANIE Raymundo MA    "

## 2022-06-21 NOTE — PATIENT INSTRUCTIONS
Update COVID vaccine    Schedule for appointment in 6 months      If A1C is over 8%, then adjustment with your metformin will be made.

## 2022-06-21 NOTE — PROGRESS NOTES
"  Assessment & Plan     Type 2 diabetes mellitus without complication, without long-term current use of insulin (H)  A1C is over 8%, then increase the metformin to 1000 mg twice daily.   I will send a [employer] message with any adjustments.   Continue to work on lifestyle changes.   - atorvastatin (LIPITOR) 10 MG tablet; Take 1 tablet (10 mg) by mouth daily  - Lipid panel reflex to direct LDL Fasting  - Basic metabolic panel  (Ca, Cl, CO2, Creat, Gluc, K, Na, BUN)  - Hemoglobin A1c  - metFORMIN (GLUCOPHAGE XR) 500 MG 24 hr tablet; TAKE TWO TABLETS BY MOUTH TWICE DAILY    Benign essential hypertension  Stable, refills given  - diltiazem ER (TIAZAC) 360 MG 24 hr ER beaded capsule; 1 table daily  - lisinopril (ZESTRIL) 20 MG tablet; Take 1 tablet (20 mg) by mouth daily    Morbid obesity (H)  Work on healthy changes.     High priority for 2019-nCoV vaccine  - COVID-19,PF,PFIZER (12+ Yrs GRAY LABEL)             BMI:   Estimated body mass index is 35.95 kg/m  as calculated from the following:    Height as of 4/6/22: 1.715 m (5' 7.5\").    Weight as of this encounter: 105.7 kg (233 lb).   Weight management plan: Discussed healthy diet and exercise guidelines        Return in about 6 months (around 12/21/2022) for diabetes, medication check.    Kristen M. Kehr, PA-C [facility]    Soraida Nguyen is a 61 year old, presenting for the following health issues:  Diabetes and Imm/Inj (COVID-19 VACCINE)      History of Present Illness       Diabetes:   She presents for follow up of diabetes.  She is not checking blood glucose. She has no concerns regarding her diabetes at this time.  She is not experiencing numbness or burning in feet, excessive thirst, blurry vision, weight changes or redness, sores or blisters on feet. The patient has had a diabetic eye exam in the last 12 months. Eye exam performed on 5/23/22. [facility]        She eats 0-1 servings of fruits and " vegetables daily.She consumes 0 sweetened beverage(s) daily.She exercises with enough effort to increase her heart rate 9 or less minutes per day.  She exercises with enough effort to increase her heart rate 3 or less days per week.   She is taking medications regularly.     SHE WILL ALSO NEED REFILLS OF MEDICATION FOR HYPERTENSION.        Review of Systems   Constitutional, HEENT, cardiovascular, pulmonary, GI, , musculoskeletal, neuro, skin, endocrine and psych systems are negative, except as otherwise noted.      Objective    /82   Pulse 76   Temp (!) 96.7  F (35.9  C) (Tympanic)   Wt 105.7 kg (233 lb)   SpO2 97%   BMI 35.95 kg/m    Body mass index is 35.95 kg/m .  Physical Exam   GENERAL: healthy, alert and no distress  HENT: ear canals and TM's normal, nose and mouth without ulcers or lesions  RESP: lungs clear to auscultation - no rales, rhonchi or wheezes  CV: regular rate and rhythm, normal S1 S2, no S3 or S4, no murmur, click or rub, no peripheral edema and peripheral pulses strong  MS: no gross musculoskeletal defects noted, no edema  SKIN: no suspicious lesions or rashes  NEURO: Normal strength and tone, mentation intact and speech normal  PSYCH: mentation appears normal, affect normal/bright                    .  ..

## 2022-09-06 ENCOUNTER — OFFICE VISIT (OUTPATIENT)
Dept: URGENT CARE | Facility: URGENT CARE | Age: 62
End: 2022-09-06
Payer: COMMERCIAL

## 2022-09-06 VITALS
DIASTOLIC BLOOD PRESSURE: 91 MMHG | TEMPERATURE: 98.2 F | OXYGEN SATURATION: 97 % | RESPIRATION RATE: 16 BRPM | WEIGHT: 235 LBS | HEART RATE: 72 BPM | BODY MASS INDEX: 36.26 KG/M2 | SYSTOLIC BLOOD PRESSURE: 159 MMHG

## 2022-09-06 DIAGNOSIS — R10.9 LEFT FLANK PAIN: Primary | ICD-10-CM

## 2022-09-06 LAB
ALBUMIN UR-MCNC: NEGATIVE MG/DL
APPEARANCE UR: CLEAR
BACTERIA #/AREA URNS HPF: ABNORMAL /HPF
BILIRUB UR QL STRIP: NEGATIVE
COLOR UR AUTO: YELLOW
GLUCOSE UR STRIP-MCNC: NEGATIVE MG/DL
HGB UR QL STRIP: ABNORMAL
KETONES UR STRIP-MCNC: NEGATIVE MG/DL
LEUKOCYTE ESTERASE UR QL STRIP: ABNORMAL
NITRATE UR QL: NEGATIVE
PH UR STRIP: 6 [PH] (ref 5–7)
RBC #/AREA URNS AUTO: ABNORMAL /HPF
SP GR UR STRIP: 1.01 (ref 1–1.03)
SQUAMOUS #/AREA URNS AUTO: ABNORMAL /LPF
UROBILINOGEN UR STRIP-ACNC: 0.2 E.U./DL
WBC #/AREA URNS AUTO: ABNORMAL /HPF

## 2022-09-06 PROCEDURE — 99213 OFFICE O/P EST LOW 20 MIN: CPT | Performed by: FAMILY MEDICINE

## 2022-09-06 PROCEDURE — 81001 URINALYSIS AUTO W/SCOPE: CPT | Performed by: FAMILY MEDICINE

## 2022-09-06 NOTE — PATIENT INSTRUCTIONS
Re check in 2-3 weeks. Re check urine.    Consider CT abd / pelvis if still having symptoms.    Activity and diet regular.    For severe pain, go to [facility].

## 2022-09-06 NOTE — PROGRESS NOTES
(R10.9) Left flank pain  (primary encounter diagnosis)  Comment:     Pain in left flank region as described with onset 5 days ago.  Gradually improving.  Somewhat undetermined cause.  Mildly abnormal UA showing a few RBCs.  Presentation somewhat suspicious for a kidney stone.  Abdominal exam was benign so diverticulitis doubtful.    Plan: UA with Microscopic reflex to Culture - lab         collect, Urine Microscopic        Since improving manage expectantly.  I did advise a follow-up in 2 to 3 weeks in any case to recheck the UA.  Consider additional work-up such as CT if symptoms persist.  To ER if worsening.      CHIEF COMPLAINT    Pain in left flank and left back.      HISTORY    Patient had onset of symptoms 5 days ago when up camping near [facility].  Pain was in the left flank and went into the left mid back.  She had 1 episode of vomiting.  She had some nausea.  Pain was at maximum about 7-8 out of 10.  That was on the first day.  Symptoms have gradually diminished over the last few days and are relatively minimal right now.    She has not had fever.  No discolored urine.  Did not have constipation.  1 episode of diarrhea today.    Diverticulosis is mentioned on her problem list.  She has not been treated for diverticulitis however.      REVIEW OF SYSTEMS    No fevers or chills.  No cough or short of breath.  No chest pain.  No edema.      EXAM  BP (!) 159/91   Pulse 72   Temp 98.2  F (36.8  C) (Tympanic)   Resp 16   Wt 106.6 kg (235 lb)   SpO2 97%   BMI 36.26 kg/m      NAD.  HEENT unremarkable.  Neck negative.  Lungs clear.  No CVAT.  Abdomen nondistended, no tenderness in any of the 4 quadrants.  No guarding or mass.  Bowel sounds slightly diminished but no high-pitched bowel sounds to suggest obstruction.  Extremities without edema.      Results for orders placed or performed in visit on 09/06/22   UA with Microscopic reflex to Culture - lab collect     Status: Abnormal    Specimen: Urine,  Midstream   Result Value Ref Range    Color Urine Yellow Colorless, Straw, Light Yellow, Yellow    Appearance Urine Clear Clear    Glucose Urine Negative Negative mg/dL    Bilirubin Urine Negative Negative    Ketones Urine Negative Negative mg/dL    Specific Gravity Urine 1.010 1.003 - 1.035    Blood Urine Moderate (A) Negative    pH Urine 6.0 5.0 - 7.0    Protein Albumin Urine Negative Negative mg/dL    Urobilinogen Urine 0.2 0.2, 1.0 E.U./dL    Nitrite Urine Negative Negative    Leukocyte Esterase Urine Trace (A) Negative   Urine Microscopic     Status: Abnormal   Result Value Ref Range    Bacteria Urine Few (A) None Seen /HPF    RBC Urine 2-5 (A) 0-2 /HPF /HPF    WBC Urine 0-5 0-5 /HPF /HPF    Squamous Epithelials Urine Few (A) None Seen /LPF    Narrative    Urine Culture not indicated

## 2022-09-23 ENCOUNTER — DOCUMENTATION ONLY (OUTPATIENT)
Dept: LAB | Facility: CLINIC | Age: 62
End: 2022-09-23

## 2022-09-23 DIAGNOSIS — E11.9 TYPE 2 DIABETES MELLITUS WITHOUT COMPLICATION, WITHOUT LONG-TERM CURRENT USE OF INSULIN (H): Primary | ICD-10-CM

## 2022-09-23 NOTE — PROGRESS NOTES
Wendy Dodd has an upcoming lab appointment:    Future Appointments   Date Time Provider Department [place]   9/27/2022  3:30 PM AN [facility]   10/10/2022 11:00 AM Kehr, Kristen M, PA-C [facility]   12/20/2022  7:00 AM Kehr, Kristen M, PA-C [facility]     Patient is scheduled for the following lab(s): Patient is requesting an A1C and a urine recheck.    There is no order available. Please review and place either future orders or HMPO (Review of Health Maintenance Protocol Orders), as appropriate.    Health Maintenance Due   Topic     ANNUAL REVIEW OF HM ORDERS      HIV SCREENING      Makenzie Carrasco

## 2022-09-27 ENCOUNTER — LAB (OUTPATIENT)
Dept: LAB | Facility: CLINIC | Age: 62
End: 2022-09-27
Payer: COMMERCIAL

## 2022-09-27 DIAGNOSIS — E11.9 TYPE 2 DIABETES MELLITUS WITHOUT COMPLICATION, WITHOUT LONG-TERM CURRENT USE OF INSULIN (H): ICD-10-CM

## 2022-09-27 LAB — HBA1C MFR BLD: 8.1 % (ref 0–5.6)

## 2022-09-27 PROCEDURE — 36415 COLL VENOUS BLD VENIPUNCTURE: CPT

## 2022-09-27 PROCEDURE — 83036 HEMOGLOBIN GLYCOSYLATED A1C: CPT

## 2022-09-27 PROCEDURE — 80053 COMPREHEN METABOLIC PANEL: CPT

## 2022-09-28 LAB
ALBUMIN SERPL-MCNC: 4.1 G/DL (ref 3.4–5)
ALP SERPL-CCNC: 77 U/L (ref 40–150)
ALT SERPL W P-5'-P-CCNC: 70 U/L (ref 0–50)
ANION GAP SERPL CALCULATED.3IONS-SCNC: 6 MMOL/L (ref 3–14)
AST SERPL W P-5'-P-CCNC: 39 U/L (ref 0–45)
BILIRUB SERPL-MCNC: 0.5 MG/DL (ref 0.2–1.3)
BUN SERPL-MCNC: 14 MG/DL (ref 7–30)
CALCIUM SERPL-MCNC: 10.8 MG/DL (ref 8.5–10.1)
CHLORIDE BLD-SCNC: 107 MMOL/L (ref 94–109)
CO2 SERPL-SCNC: 29 MMOL/L (ref 20–32)
CREAT SERPL-MCNC: 0.84 MG/DL (ref 0.52–1.04)
GFR SERPL CREATININE-BSD FRML MDRD: 79 ML/MIN/1.73M2
GLUCOSE BLD-MCNC: 108 MG/DL (ref 70–99)
POTASSIUM BLD-SCNC: 4.9 MMOL/L (ref 3.4–5.3)
PROT SERPL-MCNC: 7.8 G/DL (ref 6.8–8.8)
SODIUM SERPL-SCNC: 142 MMOL/L (ref 133–144)

## 2022-10-10 ENCOUNTER — OFFICE VISIT (OUTPATIENT)
Dept: FAMILY MEDICINE | Facility: CLINIC | Age: 62
End: 2022-10-10
Payer: COMMERCIAL

## 2022-10-10 VITALS
BODY MASS INDEX: 36.42 KG/M2 | HEART RATE: 89 BPM | TEMPERATURE: 97.6 F | OXYGEN SATURATION: 96 % | SYSTOLIC BLOOD PRESSURE: 126 MMHG | DIASTOLIC BLOOD PRESSURE: 74 MMHG | WEIGHT: 236 LBS

## 2022-10-10 DIAGNOSIS — E11.9 TYPE 2 DIABETES MELLITUS WITHOUT COMPLICATION, WITHOUT LONG-TERM CURRENT USE OF INSULIN (H): Primary | ICD-10-CM

## 2022-10-10 DIAGNOSIS — Z87.898 H/O LEFT FLANK PAIN: ICD-10-CM

## 2022-10-10 PROCEDURE — 99213 OFFICE O/P EST LOW 20 MIN: CPT | Performed by: PHYSICIAN ASSISTANT

## 2022-10-10 RX ORDER — LANCETS
EACH MISCELLANEOUS
Qty: 100 EACH | Refills: 6 | Status: SHIPPED | OUTPATIENT
Start: 2022-10-10

## 2022-10-10 RX ORDER — GLUCOSAMINE HCL/CHONDROITIN SU 500-400 MG
CAPSULE ORAL
Qty: 100 EACH | Refills: 3 | Status: SHIPPED | OUTPATIENT
Start: 2022-10-10

## 2022-10-10 ASSESSMENT — PAIN SCALES - GENERAL: PAINLEVEL: NO PAIN (0)

## 2022-10-10 NOTE — PROGRESS NOTES
Assessment & Plan     Type 2 diabetes mellitus without complication, without long-term current use of insulin (H)  She will take her medication consisently. Metformin 1000 mg twice daily. This may have contributed to the loose stool also, but she is now better and no longer having pain.   Prescription written for her glucose meter and supplies.   She is also working on weight loss with diet and exercise.   Plan to recheck in 6 months.   - blood glucose monitoring (NO BRAND SPECIFIED) meter device kit; Use to test blood sugar 1 times daily or as directed. Preferred blood glucose meter OR supplies to accompany: Blood Glucose Monitor Brands: per insurance.  - blood glucose (NO BRAND SPECIFIED) test strip; Use to test blood sugar 1 times daily or as directed. To accompany: Blood Glucose Monitor Brands: per insurance.  - blood glucose calibration (NO BRAND SPECIFIED) solution; To accompany: Blood Glucose Monitor Brands: per insurance.  - thin (NO BRAND SPECIFIED) lancets; Use with lanceting device. To accompany: Blood Glucose Monitor Brands: per insurance.  - alcohol swab prep pads; Use to swab area of injection/shaka as directed.    H/O left flank pain  She is no longer having pain.   Discussed imaging with CT scan and she will reach out via [employer] if this returns.   She has a history of diverticulosis also and this may have been a mild diverticulitis.   She also made adjustments with the metformin which could have been the cause of stool changes.   She defers any repeat testing today, symptoms have resolved.     Follow up appointment was scheduled for 6 months with lab tests prior to her appointment.     30 minutes spent on the date of the encounter doing chart review, history and exam, documentation and further activities per the note           Return in about 6 months (around 4/10/2023) for recheck, diabetes.    Kristen M. Kehr, PA-C  [facility]    Soraida Nguyen is a 61 year old,  presenting for the following health issues:  Diabetes    She was also seen in Urgent Care for left flank pain / abdominal pain. She is now longer having the pain. There was loose stool for a period of 2 weeks, but that also resolved. She declines any further testing at this time. She is concerned about cost. There has been no fever, bowels are back to normal. No blood in stool.     She did make the changes with the metformin to 1000 mg twice daily and had a hard time remembering to take the second dose, but has been able to get on schedule now that she has a pill box. She has been consistent with her medication now for about a week.     She is requesting a prescription for a glucose meter. She lost her old one.     HPI     Diabetes Follow-up      How often are you checking your blood sugar? Not at all    What concerns do you have today about your diabetes? high     Do you have any of these symptoms? (Select all that apply)  Bloated unsure if it's due to her diabetes      BP Readings from Last 2 Encounters:   10/10/22 126/74   09/06/22 (!) 159/91     Hemoglobin A1C (%)   Date Value   09/27/2022 8.1 (H)   06/21/2022 8.1 (H)   04/07/2021 6.6 (H)   12/07/2020 7.1 (H)     LDL Cholesterol Calculated (mg/dL)   Date Value   06/21/2022 42   04/07/2021 64   03/10/2020 74             Review of Systems   Constitutional, HEENT, cardiovascular, pulmonary, GI, , musculoskeletal, neuro, skin, endocrine and psych systems are negative, except as otherwise noted.      Objective    /74   Pulse 89   Temp 97.6  F (36.4  C) (Tympanic)   Wt 107 kg (236 lb)   SpO2 96%   BMI 36.42 kg/m    Body mass index is 36.42 kg/m .  Physical Exam   GENERAL: healthy, alert and no distress  PSYCH: mentation appears normal, affect normal/bright    Lab on 09/27/2022   Component Date Value Ref Range Status     Hemoglobin A1C 09/27/2022 8.1 (H)  0.0 - 5.6 % Final    Normal <5.7%   Prediabetes 5.7-6.4%    Diabetes 6.5% or higher     Note: Adopted  from ADA consensus guidelines.     Sodium 09/27/2022 142  133 - 144 mmol/L Final     Potassium 09/27/2022 4.9  3.4 - 5.3 mmol/L Final     Chloride 09/27/2022 107  94 - 109 mmol/L Final     Carbon Dioxide (CO2) 09/27/2022 29  20 - 32 mmol/L Final     Anion Gap 09/27/2022 6  3 - 14 mmol/L Final     Urea Nitrogen 09/27/2022 14  7 - 30 mg/dL Final     Creatinine 09/27/2022 0.84  0.52 - 1.04 mg/dL Final     Calcium 09/27/2022 10.8 (H)  8.5 - 10.1 mg/dL Final     Glucose 09/27/2022 108 (H)  70 - 99 mg/dL Final     Alkaline Phosphatase 09/27/2022 77  40 - 150 U/L Final     AST 09/27/2022 39  0 - 45 U/L Final     ALT 09/27/2022 70 (H)  0 - 50 U/L Final     Protein Total 09/27/2022 7.8  6.8 - 8.8 g/dL Final     Albumin 09/27/2022 4.1  3.4 - 5.0 g/dL Final     Bilirubin Total 09/27/2022 0.5  0.2 - 1.3 mg/dL Final     GFR Estimate 09/27/2022 79  >60 mL/min/1.73m2 Final    Effective December 21, 2021 eGFRcr in adults is calculated using the 2021 CKD-EPI creatinine equation which includes age and gender (Donna guevara al., NEJM, DOI: 10.1056/TVOFqa8189672)

## 2022-10-10 NOTE — NURSING NOTE
"Chief Complaint   Patient presents with     Diabetes       Initial /74   Pulse 89   Temp 97.6  F (36.4  C) (Tympanic)   Wt 107 kg (236 lb)   SpO2 96%   BMI 36.42 kg/m   Estimated body mass index is 36.42 kg/m  as calculated from the following:    Height as of 4/6/22: 1.715 m (5' 7.5\").    Weight as of this encounter: 107 kg (236 lb).  Medication Reconciliation: complete    STEPHANIE Raymundo MA    "

## 2022-10-15 ENCOUNTER — HEALTH MAINTENANCE LETTER (OUTPATIENT)
Age: 62
End: 2022-10-15

## 2022-12-20 DIAGNOSIS — I10 BENIGN ESSENTIAL HYPERTENSION: ICD-10-CM

## 2022-12-20 DIAGNOSIS — E03.9 HYPOTHYROIDISM, UNSPECIFIED TYPE: ICD-10-CM

## 2022-12-20 DIAGNOSIS — E11.9 TYPE 2 DIABETES MELLITUS WITHOUT COMPLICATION, WITHOUT LONG-TERM CURRENT USE OF INSULIN (H): ICD-10-CM

## 2022-12-20 RX ORDER — ATORVASTATIN CALCIUM 10 MG/1
TABLET, FILM COATED ORAL
Qty: 90 TABLET | Refills: 1 | Status: SHIPPED | OUTPATIENT
Start: 2022-12-20 | End: 2023-04-17

## 2022-12-20 RX ORDER — LISINOPRIL 20 MG/1
TABLET ORAL
Qty: 90 TABLET | Refills: 1 | Status: SHIPPED | OUTPATIENT
Start: 2022-12-20 | End: 2023-04-17

## 2022-12-21 RX ORDER — LEVOTHYROXINE SODIUM 112 UG/1
TABLET ORAL
Qty: 90 TABLET | Refills: 1 | Status: SHIPPED | OUTPATIENT
Start: 2022-12-21 | End: 2023-04-17

## 2022-12-21 NOTE — TELEPHONE ENCOUNTER
She will be coming in Feb for her diabetes check.   Will get thyroid testing completed at her appointment.   Kristen Kehr PA-C

## 2023-01-13 ENCOUNTER — OFFICE VISIT (OUTPATIENT)
Dept: URGENT CARE | Facility: URGENT CARE | Age: 63
End: 2023-01-13
Payer: COMMERCIAL

## 2023-01-13 VITALS
DIASTOLIC BLOOD PRESSURE: 77 MMHG | RESPIRATION RATE: 14 BRPM | HEART RATE: 68 BPM | OXYGEN SATURATION: 97 % | TEMPERATURE: 98.3 F | WEIGHT: 235 LBS | SYSTOLIC BLOOD PRESSURE: 134 MMHG | BODY MASS INDEX: 36.26 KG/M2

## 2023-01-13 DIAGNOSIS — H60.501 ACUTE OTITIS EXTERNA OF RIGHT EAR, UNSPECIFIED TYPE: Primary | ICD-10-CM

## 2023-01-13 DIAGNOSIS — H65.192 OTHER NON-RECURRENT ACUTE NONSUPPURATIVE OTITIS MEDIA OF LEFT EAR: ICD-10-CM

## 2023-01-13 DIAGNOSIS — Z98.890 HISTORY OF EAR SURGERY: ICD-10-CM

## 2023-01-13 PROCEDURE — 99213 OFFICE O/P EST LOW 20 MIN: CPT | Performed by: EMERGENCY MEDICINE

## 2023-01-13 RX ORDER — OFLOXACIN 3 MG/ML
5 SOLUTION AURICULAR (OTIC) DAILY
Qty: 5 ML | Refills: 0 | Status: SHIPPED | OUTPATIENT
Start: 2023-01-13 | End: 2023-05-24

## 2023-01-13 RX ORDER — CEFDINIR 300 MG/1
300 CAPSULE ORAL 2 TIMES DAILY
Qty: 14 CAPSULE | Refills: 0 | Status: SHIPPED | OUTPATIENT
Start: 2023-01-13 | End: 2023-01-20

## 2023-01-13 ASSESSMENT — PAIN SCALES - GENERAL: PAINLEVEL: MILD PAIN (2)

## 2023-01-13 NOTE — PROGRESS NOTES
Assessment & Plan     Diagnosis:    (H60.501) Acute otitis externa of right ear, unspecified type  (primary encounter diagnosis)  Plan: ofloxacin (FLOXIN) 0.3 % otic solution, Adult         ENT  Referral    (H65.192) Other non-recurrent acute nonsuppurative otitis media of left ear  Plan: cefdinir (OMNICEF) 300 MG capsule    (Z98.890) History of ear surgery  Plan: Adult ENT  Referral    Medical Decision Making:  Wendy Dodd is a 62 year old female with hx of remote reconstructive ear surgery on the right presents to clinic for concern for ear infection. Associated symptoms include watery/milky discharge from the right ear; some left ear pain as well. The patient has an exam consistent with otitis media on the left and OE on the right. No perforation or mastoid tenderness. There is no sign of mastoiditis, dental abscess, or peritonsillar abscess. The patient will be started on antibiotics and may take dose appropriate Tylenol or ibuprofen for pain.  Go to the ER or back to urgent care if increasing pain, worsening fever, hearing decrease or discharge.  Follow-up with PCP or ENT in 7-10 days      [facility]    Subjective     Wendy Dodd is a 62 year old female who presents to clinic today for the following health issues:  Chief Complaint   Patient presents with     Ear Problem     Sx right ear - hard time hearing and liquid coming out.Mostly at night .Suspected infection       HPI    Onset of symptoms was 2 week(s) ago.  Course of illness is about the same.    Severity moderate  Current and Associated symptoms: stuffy nose, cough - non-productive, ear pain on the left and ear drainage on the right (muffled hearing as well).  Denies fever, cough - productive, wheezing, shortness of breath, sore throat, facial pain/pressure, body aches, chest pain, shortness of breath.  Treatment measures tried include Decongestants and OTC Cough  "med  Predisposing factors include recent illness: \"cold\" a couple weeks ago and hx of reconstructive ear surgery (right)    Patient has been no difficulties breathing or swallowing or recent ear infection or antibiotics.       Review of Systems    See HPI    Objective      Vitals: /77   Pulse 68   Temp 98.3  F (36.8  C) (Tympanic)   Resp 14   Wt 106.6 kg (235 lb)   SpO2 97%   BMI 36.26 kg/m      Patient Vitals for the past 24 hrs:   BP Temp Temp src Pulse Resp SpO2 Weight   01/13/23 1349 134/77 98.3  F (36.8  C) Tympanic 68 14 97 % 106.6 kg (235 lb)       Vital signs reviewed by: Jaron Bartholomew PA-C    Physical Exam   Constitutional: Alert and active. No acute distress.  HENT: Ears: Right TM is non-erythematous. Right ear canal with whitish macerated tissue and liquid and swelling.  Left TM is erythematous and slightly bulging No perforation. Left ear canal is normal/clear. No tenderness with manipulation of the pinnae and tragus. No mastoid tenderness bilaterally.  Nose: Nose normal.    Mouth: Normal tongue and tonsil. Posterior oropharynx is clear. Uvula is midline.  Cardiovascular: Regular rate and rhythm  Pulmonary/Chest: Effort normal. No respiratory distress. Lungs clear to auscultation bilaterally.  Skin: No rash noted on visualized skin or face.      Jaron Bartholomew PA-C, January 13, 2023      "

## 2023-01-27 DIAGNOSIS — E11.9 TYPE 2 DIABETES MELLITUS WITHOUT COMPLICATION, WITHOUT LONG-TERM CURRENT USE OF INSULIN (H): ICD-10-CM

## 2023-01-27 DIAGNOSIS — I10 BENIGN ESSENTIAL HYPERTENSION: ICD-10-CM

## 2023-01-30 RX ORDER — METFORMIN HCL 500 MG
TABLET, EXTENDED RELEASE 24 HR ORAL
Qty: 360 TABLET | Refills: 0 | Status: SHIPPED | OUTPATIENT
Start: 2023-01-30 | End: 2023-04-17

## 2023-01-30 RX ORDER — DILTIAZEM HYDROCHLORIDE 360 MG/1
CAPSULE, EXTENDED RELEASE ORAL
Qty: 90 CAPSULE | Refills: 0 | Status: SHIPPED | OUTPATIENT
Start: 2023-01-30 | End: 2023-04-17

## 2023-02-14 ENCOUNTER — OFFICE VISIT (OUTPATIENT)
Dept: FAMILY MEDICINE | Facility: CLINIC | Age: 63
End: 2023-02-14
Payer: COMMERCIAL

## 2023-02-14 VITALS
BODY MASS INDEX: 34.96 KG/M2 | DIASTOLIC BLOOD PRESSURE: 86 MMHG | HEART RATE: 98 BPM | SYSTOLIC BLOOD PRESSURE: 132 MMHG | TEMPERATURE: 97.2 F | WEIGHT: 236 LBS | HEIGHT: 69 IN | RESPIRATION RATE: 16 BRPM | OXYGEN SATURATION: 94 %

## 2023-02-14 DIAGNOSIS — H92.11 DRAINAGE FROM RIGHT EAR: Primary | ICD-10-CM

## 2023-02-14 PROCEDURE — 99213 OFFICE O/P EST LOW 20 MIN: CPT | Performed by: PHYSICIAN ASSISTANT

## 2023-02-14 ASSESSMENT — PAIN SCALES - GENERAL: PAINLEVEL: NO PAIN (0)

## 2023-02-14 NOTE — NURSING NOTE
"Chief Complaint   Patient presents with     Ear Problem     Right ear       Initial /86   Pulse 98   Temp 97.2  F (36.2  C) (Tympanic)   Resp 16   Ht 1.74 m (5' 8.5\")   Wt 107 kg (236 lb)   SpO2 94%   BMI 35.36 kg/m   Estimated body mass index is 35.36 kg/m  as calculated from the following:    Height as of this encounter: 1.74 m (5' 8.5\").    Weight as of this encounter: 107 kg (236 lb).  Medication Reconciliation: complete    STEPHANIE Raymundo MA    "

## 2023-02-14 NOTE — PROGRESS NOTES
"  Assessment & Plan     Drainage from right ear  Reassurance exam of right ear is normal. Ear canal is no longer red and inflamed. No need for further medications.   Expect drainage to resolve with time.                  BMI:   Estimated body mass index is 35.36 kg/m  as calculated from the following:    Height as of this encounter: 1.74 m (5' 8.5\").    Weight as of this encounter: 107 kg (236 lb).   Weight management plan: not addressed        Return in about 2 weeks (around 2/28/2023) for specialty, as needed if symptoms worsen or persist.    Kristen M. Kehr, PA-C  [facility]   Wendy is a 62 year old, presenting for the following health issues:  Ear Problem (Right ear)      Wendy was seen recently in Urgent Care and treated with oral antibiotic and ear drops. She has been having clear drainage from her right ear. There is no ear pain associated. She has some change in hearing in that ear.   She was unable to get an appointment with ENT within the next month, she is here for follow up.     She does not have any URI symptoms.   No nasal congestion.     History of Present Illness       Reason for visit:  Right ear draining water  Symptom onset:  3-4 weeks ago  Symptoms include:  Water coming out of my ear  Symptom intensity:  Mild  Symptom progression:  Staying the same  Had these symptoms before:  No    She eats 0-1 servings of fruits and vegetables daily.She consumes 0 sweetened beverage(s) daily.She exercises with enough effort to increase her heart rate 10 to 19 minutes per day.  She exercises with enough effort to increase her heart rate 6 days per week.   She is taking medications regularly.             Review of Systems   Constitutional, HEENT, cardiovascular, pulmonary, GI, , musculoskeletal, neuro, skin, endocrine and psych systems are negative, except as otherwise noted.      Objective    /86   Pulse 98   Temp 97.2  F (36.2  C) (Tympanic)   Resp 16   Ht 1.74 " "m (5' 8.5\")   Wt 107 kg (236 lb)   SpO2 94%   BMI 35.36 kg/m    Body mass index is 35.36 kg/m .  Physical Exam   GENERAL: healthy, alert and no distress  EYES: Eyes grossly normal to inspection, PERRL and conjunctivae and sclerae normal  HENT: normal cephalic/atraumatic, Initially, there was some cerumen in the right ear, this was flushed. After ear flush both ears: normal: no effusions, no erythema, normal landmarks, oropharynx clear and oral mucous membranes moist  PSYCH: mentation appears normal, affect normal/bright                    "

## 2023-02-14 NOTE — NURSING NOTE
Patient identified using two patient identifiers.  Ear exam showing wax occlusion completed by provider.  Solution: warm water was placed in the right ear(s) via irrigation tool: elephant ear.    Demetrius SOLIS MA

## 2023-03-01 ENCOUNTER — TRANSFERRED RECORDS (OUTPATIENT)
Dept: MULTI SPECIALTY CLINIC | Facility: CLINIC | Age: 63
End: 2023-03-01

## 2023-03-01 LAB — RETINOPATHY: NORMAL

## 2023-03-25 ENCOUNTER — HEALTH MAINTENANCE LETTER (OUTPATIENT)
Age: 63
End: 2023-03-25

## 2023-04-10 ENCOUNTER — DOCUMENTATION ONLY (OUTPATIENT)
Dept: FAMILY MEDICINE | Facility: CLINIC | Age: 63
End: 2023-04-10

## 2023-04-10 ENCOUNTER — LAB (OUTPATIENT)
Dept: LAB | Facility: OTHER | Age: 63
End: 2023-04-10
Payer: COMMERCIAL

## 2023-04-10 DIAGNOSIS — E11.9 TYPE 2 DIABETES MELLITUS WITHOUT COMPLICATION, WITHOUT LONG-TERM CURRENT USE OF INSULIN (H): Primary | ICD-10-CM

## 2023-04-10 DIAGNOSIS — E03.9 HYPOTHYROIDISM, UNSPECIFIED TYPE: ICD-10-CM

## 2023-04-10 DIAGNOSIS — E11.9 TYPE 2 DIABETES MELLITUS WITHOUT COMPLICATION, WITHOUT LONG-TERM CURRENT USE OF INSULIN (H): ICD-10-CM

## 2023-04-10 LAB
ANION GAP SERPL CALCULATED.3IONS-SCNC: 10 MMOL/L (ref 7–15)
BUN SERPL-MCNC: 15.9 MG/DL (ref 8–23)
CALCIUM SERPL-MCNC: 10.2 MG/DL (ref 8.8–10.2)
CHLORIDE SERPL-SCNC: 102 MMOL/L (ref 98–107)
CREAT SERPL-MCNC: 0.71 MG/DL (ref 0.51–0.95)
CREAT UR-MCNC: 29 MG/DL
DEPRECATED HCO3 PLAS-SCNC: 29 MMOL/L (ref 22–29)
GFR SERPL CREATININE-BSD FRML MDRD: >90 ML/MIN/1.73M2
GLUCOSE SERPL-MCNC: 182 MG/DL (ref 70–99)
HBA1C MFR BLD: 8.5 % (ref 0–5.6)
HOLD SPECIMEN: NORMAL
MICROALBUMIN UR-MCNC: <12 MG/L
MICROALBUMIN/CREAT UR: NORMAL MG/G{CREAT}
POTASSIUM SERPL-SCNC: 4.1 MMOL/L (ref 3.4–5.3)
SODIUM SERPL-SCNC: 141 MMOL/L (ref 136–145)
T4 FREE SERPL-MCNC: 1.37 NG/DL (ref 0.9–1.7)
TSH SERPL DL<=0.005 MIU/L-ACNC: 10.12 UIU/ML (ref 0.3–4.2)

## 2023-04-10 PROCEDURE — 84443 ASSAY THYROID STIM HORMONE: CPT

## 2023-04-10 PROCEDURE — 82043 UR ALBUMIN QUANTITATIVE: CPT

## 2023-04-10 PROCEDURE — 84439 ASSAY OF FREE THYROXINE: CPT

## 2023-04-10 PROCEDURE — 82570 ASSAY OF URINE CREATININE: CPT

## 2023-04-10 PROCEDURE — 80048 BASIC METABOLIC PNL TOTAL CA: CPT

## 2023-04-10 PROCEDURE — 36415 COLL VENOUS BLD VENIPUNCTURE: CPT

## 2023-04-10 PROCEDURE — 83036 HEMOGLOBIN GLYCOSYLATED A1C: CPT

## 2023-04-10 NOTE — PROGRESS NOTES
Patient came in for labs and would like her a1c checked. If you would like these tests done please place an add on order.    Thank You,  Concha Fischer MLT(ASCP)

## 2023-04-13 ENCOUNTER — ALLIED HEALTH/NURSE VISIT (OUTPATIENT)
Dept: FAMILY MEDICINE | Facility: OTHER | Age: 63
End: 2023-04-13
Payer: COMMERCIAL

## 2023-04-13 DIAGNOSIS — Z78.9 TRIAGE ASSESSMENT CLASS 3, NONURGENT: Primary | ICD-10-CM

## 2023-04-13 PROCEDURE — 99207 PR NO CHARGE NURSE ONLY: CPT

## 2023-04-13 NOTE — PROGRESS NOTES
At approximately 0530 this morning, patient accidentally used fluorouracil 5% in her left eye. She brought the bottle with her. It states on the bottle that it is not for ophthalmic use. She uses this to treat pre-cancerous dry spots on her head. It was not prescribed to her. It stung and there was immediate redness. On assessment now, RN notes redness and that the eye looks slightly watery. Patient denies vision changes, dizziness, headache, lightheadedness, or other symptoms.     Contacted poison control. After review of this medication, the pharmacist noted that there is no profound concern for lingering issues. If patient had called initially, they would recommend flushing for 10-15 min with water. Patient used systane eye drops-they would recommend against this as it is a lubricating drop and can keep the irritant on the eye. If she develops pain, blurred vision, or discharge, should seek thorough assessment by an eye doctor. Patient was updated and educated on what to do to flush eyes in the future and symptoms to monitor for.    Updated provider, CDL. She assessed patient's eyes at 1008. No visible abnormalities. Ok for patient to be discharged home to monitor. Patient denies questions and is walked out of the clinic at 1012.     Liz Govea, VAMSHIN, RN, PHN  Registered Nurse-Clinic Triage  [facility]/[place]  4/13/2023 at 10:25 AM

## 2023-04-17 ENCOUNTER — OFFICE VISIT (OUTPATIENT)
Dept: FAMILY MEDICINE | Facility: CLINIC | Age: 63
End: 2023-04-17
Payer: COMMERCIAL

## 2023-04-17 VITALS
DIASTOLIC BLOOD PRESSURE: 80 MMHG | RESPIRATION RATE: 16 BRPM | WEIGHT: 237 LBS | TEMPERATURE: 97.7 F | OXYGEN SATURATION: 96 % | BODY MASS INDEX: 35.1 KG/M2 | SYSTOLIC BLOOD PRESSURE: 132 MMHG | HEIGHT: 69 IN | HEART RATE: 70 BPM

## 2023-04-17 DIAGNOSIS — E11.9 TYPE 2 DIABETES MELLITUS WITHOUT COMPLICATION, WITHOUT LONG-TERM CURRENT USE OF INSULIN (H): Primary | ICD-10-CM

## 2023-04-17 DIAGNOSIS — E03.9 HYPOTHYROIDISM, UNSPECIFIED TYPE: ICD-10-CM

## 2023-04-17 DIAGNOSIS — E66.01 MORBID OBESITY (H): ICD-10-CM

## 2023-04-17 DIAGNOSIS — I10 BENIGN ESSENTIAL HYPERTENSION: ICD-10-CM

## 2023-04-17 PROCEDURE — 99214 OFFICE O/P EST MOD 30 MIN: CPT | Performed by: PHYSICIAN ASSISTANT

## 2023-04-17 RX ORDER — LEVOTHYROXINE SODIUM 125 UG/1
125 TABLET ORAL DAILY
Qty: 90 TABLET | Refills: 0 | Status: SHIPPED | OUTPATIENT
Start: 2023-04-17 | End: 2023-07-17

## 2023-04-17 RX ORDER — LISINOPRIL 20 MG/1
20 TABLET ORAL DAILY
Qty: 90 TABLET | Refills: 3 | Status: SHIPPED | OUTPATIENT
Start: 2023-04-17 | End: 2023-12-19

## 2023-04-17 RX ORDER — ATORVASTATIN CALCIUM 10 MG/1
10 TABLET, FILM COATED ORAL DAILY
Qty: 90 TABLET | Refills: 3 | Status: SHIPPED | OUTPATIENT
Start: 2023-04-17 | End: 2024-03-19

## 2023-04-17 RX ORDER — METFORMIN HCL 500 MG
TABLET, EXTENDED RELEASE 24 HR ORAL
Qty: 360 TABLET | Refills: 3 | Status: SHIPPED | OUTPATIENT
Start: 2023-04-17 | End: 2024-03-19

## 2023-04-17 RX ORDER — DILTIAZEM HYDROCHLORIDE 360 MG/1
CAPSULE, EXTENDED RELEASE ORAL
Qty: 90 CAPSULE | Refills: 3 | Status: ON HOLD | OUTPATIENT
Start: 2023-04-17 | End: 2023-11-11

## 2023-04-17 ASSESSMENT — PAIN SCALES - GENERAL: PAINLEVEL: NO PAIN (0)

## 2023-04-17 NOTE — PATIENT INSTRUCTIONS
SCHEDULE AN APPOINTMENT WITH DIABETES EDUCATION /JENNIFER MARINO IN [facility] [phone]    START THE NEW MEDICATION FOR DIABETES MOUNJARO    NEW DOSE OF LEVOTHYROXINE 125 MCG DAILY    RECHECK IN 3 MONTHS WITH LAB ONLY APPOINTMENT PRIOR TO OFFICE VISIT (YOU DO NOT NEED TO FAST FOR THESE TESTS)

## 2023-04-17 NOTE — PROGRESS NOTES
Assessment & Plan     Type 2 diabetes mellitus without complication, without long-term current use of insulin (H)  Add mounjaro 2.5 mg once weekly. Side effects and how to take the medication discussed.  Continue metformin  Schedule with Taylor Briggs in Diabetes Education within the next month.   Will plan to increase mounjaro to 5 mg if she is tolerating after a month.   Recheck in 3 months.   Work on lifestyle changes also.   - tirzepatide (MOUNJARO) 2.5 MG/0.5ML pen; Inject 2.5 mg Subcutaneous every 7 days  - **Hemoglobin A1c FUTURE 3mo; Future  - AMB Adult Diabetes Educator Referral; Future  - metFORMIN (GLUCOPHAGE XR) 500 MG 24 hr tablet; TAKE TWO TABLETS BY MOUTH TWICE A DAY  - atorvastatin (LIPITOR) 10 MG tablet; Take 1 tablet (10 mg) by mouth daily    Hypothyroidism, unspecified type  Adjust dose of the levothyroxine to 125 mcg daily  Recheck in 3 months   - levothyroxine (SYNTHROID/LEVOTHROID) 125 MCG tablet; Take 1 tablet (125 mcg) by mouth daily  - **TSH with free T4 reflex FUTURE 2mo; Future    Benign essential hypertension  Stable, refills given   - diltiazem ER (TIAZAC) 360 MG 24 hr ER beaded capsule; TAKE ONE CAPSULE BY MOUTH ONCE DAILY  - lisinopril (ZESTRIL) 20 MG tablet; Take 1 tablet (20 mg) by mouth daily    Morbid obesity (H)  Lifestyle modification                   Kristen M. Kehr, PA-C  [facility]   Wendy is a 62 year old, presenting for the following health issues:  Diabetes         View : No data to display.              History of Present Illness       Diabetes:   She presents for follow up of diabetes.  She is checking home blood glucose one time daily. She checks blood glucose before meals.  Blood glucose is sometimes over 200 and never under 70. When her blood glucose is low, the patient is asymptomatic for confusion, blurred vision, lethargy and reports not feeling dizzy, shaky, or weak.  She has no concerns regarding her diabetes at this time.   "She is not experiencing numbness or burning in feet, excessive thirst, blurry vision, weight changes or redness, sores or blisters on feet.         Hypothyroidism:     Since last visit, patient describes the following symptoms::  NoneShe consumes 0 sweetened beverage(s) daily.She exercises with enough effort to increase her heart rate 9 or less minutes per day.  She exercises with enough effort to increase her heart rate 3 or less days per week.   She is taking medications regularly.               Review of Systems   Constitutional, HEENT, cardiovascular, pulmonary, GI, , musculoskeletal, neuro, skin, endocrine and psych systems are negative, except as otherwise noted.      Objective    /80   Pulse 70   Temp 97.7  F (36.5  C) (Tympanic)   Resp 16   Ht 1.74 m (5' 8.5\")   Wt 107.5 kg (237 lb)   SpO2 96%   BMI 35.51 kg/m    Body mass index is 35.51 kg/m .  Physical Exam   GENERAL: healthy, alert and no distress  PSYCH: mentation appears normal, affect normal/bright    Lab on 04/10/2023   Component Date Value Ref Range Status     TSH 04/10/2023 10.12 (H)  0.30 - 4.20 uIU/mL Final     Creatinine Urine mg/dL 04/10/2023 29.0  mg/dL Final    The reference ranges have not been established in urine creatinine. The results should be integrated into the clinical context for interpretation.     Albumin Urine mg/L 04/10/2023 <12.0  mg/L Final    The reference ranges have not been established in urine albumin. The results should be integrated into the clinical context for interpretation.     Albumin Urine mg/g Cr 04/10/2023    Final    Unable to calculate, urine albumin and/or urine creatinine is outside detectable limits.  Microalbuminuria is defined as an albumin:creatinine ratio of 17 to 299 for males and 25 to 299 for females. A ratio of albumin:creatinine of 300 or higher is indicative of overt proteinuria.  Due to biologic variability, positive results should be confirmed by a second, first-morning random or " 24-hour timed urine specimen. If there is discrepancy, a third specimen is recommended. When 2 out of 3 results are in the microalbuminuria range, this is evidence for incipient nephropathy and warrants increased efforts at glucose control, blood pressure control, and institution of therapy with an angiotensin-converting-enzyme (ACE) inhibitor (if the patient can tolerate it).       Hold Specimen 04/10/2023 [facility]   Final     Hemoglobin A1C 04/10/2023 8.5 (H)  0.0 - 5.6 % Final    Normal <5.7%   Prediabetes 5.7-6.4%    Diabetes 6.5% or higher     Note: Adopted from ADA consensus guidelines.     Sodium 04/10/2023 141  136 - 145 mmol/L Final     Potassium 04/10/2023 4.1  3.4 - 5.3 mmol/L Final     Chloride 04/10/2023 102  98 - 107 mmol/L Final     Carbon Dioxide (CO2) 04/10/2023 29  22 - 29 mmol/L Final     Anion Gap 04/10/2023 10  7 - 15 mmol/L Final     Urea Nitrogen 04/10/2023 15.9  8.0 - 23.0 mg/dL Final     Creatinine 04/10/2023 0.71  0.51 - 0.95 mg/dL Final     Calcium 04/10/2023 10.2  8.8 - 10.2 mg/dL Final     Glucose 04/10/2023 182 (H)  70 - 99 mg/dL Final     GFR Estimate 04/10/2023 >90  >60 mL/min/1.73m2 Final    eGFR calculated using 2021 CKD-EPI equation.     Free T4 04/10/2023 1.37  0.90 - 1.70 ng/dL Final

## 2023-04-27 ENCOUNTER — TRANSFERRED RECORDS (OUTPATIENT)
Dept: HEALTH INFORMATION MANAGEMENT | Facility: CLINIC | Age: 63
End: 2023-04-27

## 2023-05-05 ENCOUNTER — TRANSFERRED RECORDS (OUTPATIENT)
Dept: MULTI SPECIALTY CLINIC | Facility: CLINIC | Age: 63
End: 2023-05-05

## 2023-05-19 NOTE — PROGRESS NOTES
ENT Consultation    Wendy Dodd who is a 62 year old female seen in consultation at the request of Kristen Kehr .      History of Present Illness - Wendy Dodd is a 62 year old female drainage from right ear     Patient has a long and extensive history of ear disease bilaterally but more on the right side.  She has had the multiple tubes has had surgeries on both sides apparently tympanoplasty with acicular reconstruction in early 2000's also may have had a tympanoplasty on the left prior to that.  Left ear does not seem to bother her but on the right she notices that the ear started draining for 5 months ago mostly clear odorless fluid.  No fever no chills no pain noted.  Hearing definitely somewhat down.  No vertigo no dizziness no tinnitus noted.      BP Readings from Last 1 Encounters:   05/24/23 118/68       BP noted to be well controlled today in office.     Wendy IS NOT a smoker/uses chewing tobacco.        Past Medical History -   Past Medical History:   Diagnosis Date     Cancer (H)      Cerebral infarction (H)      Diabetes (H)      Hypertension      Sleep apnea      Thyroid disease      Type 2 diabetes mellitus without complication, without long-term current use of insulin (H)        Current Medications -   Current Outpatient Medications:      alcohol swab prep pads, Use to swab area of injection/shaka as directed., Disp: 100 each, Rfl: 3     aspirin 81 MG tablet, Take 1 tablet (81 mg) by mouth daily, Disp: 30 tablet, Rfl: 11     atorvastatin (LIPITOR) 10 MG tablet, Take 1 tablet (10 mg) by mouth daily, Disp: 90 tablet, Rfl: 3     blood glucose (NO BRAND SPECIFIED) test strip, Use to test blood sugar 1 times daily or as directed. To accompany: Blood Glucose Monitor Brands: per insurance., Disp: 100 strip, Rfl: 6     blood glucose calibration (NO BRAND SPECIFIED) solution, To accompany: Blood Glucose Monitor Brands: per insurance., Disp: 100 each, Rfl: 3     blood glucose monitoring  (ACCU-CHEK RENY PLUS) test strip, Use to test blood sugar 2 times daily or as directed., Disp: 200 strip, Rfl: 11     blood glucose monitoring (ACCU-CHEK FASTCLIX) lancets, Use to test blood sugar 2 times daily or as directed., Disp: 102 each, Rfl: 11     blood glucose monitoring (NO BRAND SPECIFIED) meter device kit, Use to test blood sugar 1 times daily or as directed. Preferred blood glucose meter OR supplies to accompany: Blood Glucose Monitor Brands: per insurance., Disp: 1 kit, Rfl: 0     Cyanocobalamin (B-12 PO), , Disp: , Rfl:      diltiazem ER (TIAZAC) 360 MG 24 hr ER beaded capsule, TAKE ONE CAPSULE BY MOUTH ONCE DAILY, Disp: 90 capsule, Rfl: 3     folic acid 800 MCG TABS, , Disp: , Rfl:      gabapentin (NEURONTIN) 300 MG capsule, Take 300 mg by mouth daily, Disp: , Rfl:      IRON PO, , Disp: , Rfl:      levothyroxine (SYNTHROID/LEVOTHROID) 125 MCG tablet, Take 1 tablet (125 mcg) by mouth daily, Disp: 90 tablet, Rfl: 0     lisinopril (ZESTRIL) 20 MG tablet, Take 1 tablet (20 mg) by mouth daily, Disp: 90 tablet, Rfl: 3     metFORMIN (GLUCOPHAGE XR) 500 MG 24 hr tablet, TAKE TWO TABLETS BY MOUTH TWICE A DAY, Disp: 360 tablet, Rfl: 3     thin (NO BRAND SPECIFIED) lancets, Use with lanceting device. To accompany: Blood Glucose Monitor Brands: per insurance., Disp: 100 each, Rfl: 6     tirzepatide (MOUNJARO) 2.5 MG/0.5ML pen, Inject 2.5 mg Subcutaneous every 7 days, Disp: 2 mL, Rfl: 0    Allergies -   Allergies   Allergen Reactions     Penicillins      Sulfa Antibiotics Unknown       Social History -   Social History     Socioeconomic History     Marital status:    Occupational History     Comment: [employer]   Tobacco Use     Smoking status: Former     Packs/day: 0.00     Years: 0.00     Pack years: 0.00     Types: Cigarettes, Other     Start date: 1975     Quit date: 2015     Years since quittin.3     Smokeless tobacco: Never     Tobacco comments:     I use Ecig   Vaping Use      Vaping status: Every Day     Substances: Nicotine, Flavoring     Devices: Refillable tank   Substance and Sexual Activity     Alcohol use: Yes     Alcohol/week: 0.0 standard drinks of alcohol     Drug use: No     Sexual activity: Not Currently     Partners: Male     Birth control/protection: None   Other Topics Concern     Parent/sibling w/ CABG, MI or angioplasty before 65F 55M? No   Social History Narrative    ENVIRONMENTAL HISTORY: [place] is heated with a forced air and gas furnace. They do have central air conditioning. The patient's bedroom is furnished with carpeting in bedroom.  Pets inside the house include 1 cat(s). There is history of cockroach or mice infestation. There is/are 0 smokers in the house.  The house does not have a damp basement.      Social Determinants of Health     Financial Resource Strain: Low Risk  (5/13/2020)    Overall Financial Resource Strain (CARDIA)      Difficulty of Paying Living Expenses: Not hard at all   Food Insecurity: No Food Insecurity (5/13/2020)    Hunger Vital Sign      Worried About Running Out of Food in the Last Year: Never true      Ran Out of Food in the Last Year: Never true   Transportation Needs: No Transportation Needs (5/13/2020)    PRAPARE - Transportation      Lack of Transportation (Medical): No      Lack of Transportation (Non-Medical): No   Stress: No Stress Concern Present (5/13/2020)    [employer] - Occupational Stress Questionnaire      Feeling of Stress : Only a little   Social Connections: Unknown (5/13/2020)    Social Connection and Isolation Panel [NHANES]      Frequency of Communication with Friends and Family: More than three times a week   Intimate Partner Violence: Unknown (5/13/2020)    Humiliation, Afraid, Rape, and Kick questionnaire      Fear of Current or Ex-Partner: No       Family History -   Family History   Problem Relation Age of Onset     Colon Cancer  "Father      Other Cancer Father         liver     Other Cancer Mother      Other Cancer Brother      Skin Cancer Sister      Asthma No family hx of      Allergic rhinitis No family hx of        Review of Systems - As per HPI and PMHx, otherwise review of system review of the head and neck negative. Otherwise 10+ review of system is negative    Physical Exam  /68   Temp (!) 96.1  F (35.6  C) (Temporal)   Ht 1.74 m (5' 8.5\")   Wt 107.5 kg (237 lb)   BMI 35.51 kg/m    BMI: Body mass index is 35.51 kg/m .    General - The patient is well nourished and well developed, and appears to have good nutritional status.  Alert and oriented to person and place, answers questions and cooperates with examination appropriately.    SKIN - No suspicious lesions or rashes.  Respiration - No respiratory distress.  Head and Face - Normocephalic and atraumatic, with no gross asymmetry noted of the contour of the facial features.  The facial nerve is intact, with strong symmetric movements.    Voice and Breathing - The patient was breathing comfortably without the use of accessory muscles. The patients voice was clear and strong, and had appropriate pitch and quality.    Ears - Bilateral pinna and EACs with normal appearing overlying skin.  Left tympanic membrane is clear with severe myringosclerosis and tympanosclerosis.  On the right side tympanic membrane is thickened superiorly in the area of the pars tensa and in the area of pars flaccida more thickening mild erythema but no wetness noted.  No postauricular tenderness or erythema noted on either side.  Eyes - Extraocular movements intact.  Sclera were not icteric or injected, conjunctiva were pink and moist.    Mouth - Examination of the oral cavity showed pink, healthy oral mucosa. No lesions or ulcerations noted.  The tongue was mobile and midline, and the dentition were in good condition.      Throat - The walls of the oropharynx were smooth, pink, moist, symmetric, and " had no lesions or ulcerations.  The tonsillar pillars and soft palate were symmetric.  The uvula was midline on elevation.    Neck - Normal midline excursion of the laryngotracheal complex during swallowing.  Full range of motion on passive movement.  Palpation of the occipital, submental, submandibular, internal jugular chain, and supraclavicular nodes did not demonstrate any abnormal lymph nodes or masses.  The carotid pulse was palpable bilaterally.  Palpation of the thyroid was soft and smooth, with no nodules or goiter appreciated.  The trachea was mobile and midline.    Nose - External contour is symmetric, no gross deflection or scars.  Nasal mucosa is pink and moist with no abnormal mucus.  The septum was midline and non-obstructive, turbinates of normal size and position.  No polyps, masses, or purulence noted on examination.    Neuro - Nonfocal neuro exam is normal, CN 2 through 12 intact, normal gait and muscle tone.      Performed in clinic today:  No procedures preformed in clinic today      A/P - Wendy Dodd is a 62 year old female with chronic otitis media on the right possible mastoiditis.  We will do about cholesteatoma.  CT of temporal bones will be obtained.  In the meantime we will start Ciprodex drops on the right side.  She will stop the drops at least 24 hours before CT scan to make sure clear images are obtained.  Patient will see us back in a few weeks to discuss the results of the CT as well as her response to Ciprodex.      Figueroa Bobo MD

## 2023-05-24 ENCOUNTER — OFFICE VISIT (OUTPATIENT)
Dept: OTOLARYNGOLOGY | Facility: OTHER | Age: 63
End: 2023-05-24
Attending: PHYSICIAN ASSISTANT
Payer: COMMERCIAL

## 2023-05-24 ENCOUNTER — OFFICE VISIT (OUTPATIENT)
Dept: AUDIOLOGY | Facility: OTHER | Age: 63
End: 2023-05-24
Attending: PHYSICIAN ASSISTANT
Payer: COMMERCIAL

## 2023-05-24 VITALS
HEIGHT: 69 IN | TEMPERATURE: 96.1 F | WEIGHT: 237 LBS | SYSTOLIC BLOOD PRESSURE: 118 MMHG | BODY MASS INDEX: 35.1 KG/M2 | DIASTOLIC BLOOD PRESSURE: 68 MMHG

## 2023-05-24 DIAGNOSIS — H92.11 DRAINAGE FROM RIGHT EAR: ICD-10-CM

## 2023-05-24 DIAGNOSIS — H66.91 CHRONIC OTITIS MEDIA OF RIGHT EAR: Primary | ICD-10-CM

## 2023-05-24 DIAGNOSIS — H92.11 OTORRHEA, RIGHT: Primary | ICD-10-CM

## 2023-05-24 PROCEDURE — 99207 PR NO CHARGE LOS: CPT | Performed by: AUDIOLOGIST

## 2023-05-24 PROCEDURE — 99203 OFFICE O/P NEW LOW 30 MIN: CPT | Performed by: OTOLARYNGOLOGY

## 2023-05-24 RX ORDER — CIPROFLOXACIN AND DEXAMETHASONE 3; 1 MG/ML; MG/ML
4 SUSPENSION/ DROPS AURICULAR (OTIC) 2 TIMES DAILY
Qty: 7.5 ML | Refills: 0 | Status: SHIPPED | OUTPATIENT
Start: 2023-05-24 | End: 2023-06-13

## 2023-05-24 ASSESSMENT — PAIN SCALES - GENERAL: PAINLEVEL: NO PAIN (0)

## 2023-05-24 NOTE — LETTER
5/24/2023         RE: Wendy Dodd  [address] [facility] [id number]        Dear Colleague,    Thank you for referring your patient, Wendy Dodd, to the [facility]. Please see a copy of my visit note below.    ENT Consultation    Wendy Dodd who is a 62 year old female seen in consultation at the request of Kristen Kehr .      History of Present Illness - Wendy Dodd is a 62 year old female drainage from right ear     Patient has a long and extensive history of ear disease bilaterally but more on the right side.  She has had the multiple tubes has had surgeries on both sides apparently tympanoplasty with acicular reconstruction in early 2000's also may have had a tympanoplasty on the left prior to that.  Left ear does not seem to bother her but on the right she notices that the ear started draining for 5 months ago mostly clear odorless fluid.  No fever no chills no pain noted.  Hearing definitely somewhat down.  No vertigo no dizziness no tinnitus noted.      BP Readings from Last 1 Encounters:   05/24/23 118/68       BP noted to be well controlled today in office.     Wendy IS NOT a smoker/uses chewing tobacco.        Past Medical History -   Past Medical History:   Diagnosis Date     Cancer (H)      Cerebral infarction (H)      Diabetes (H)      Hypertension      Sleep apnea      Thyroid disease      Type 2 diabetes mellitus without complication, without long-term current use of insulin (H)        Current Medications -   Current Outpatient Medications:      alcohol swab prep pads, Use to swab area of injection/shaka as directed., Disp: 100 each, Rfl: 3     aspirin 81 MG tablet, Take 1 tablet (81 mg) by mouth daily, Disp: 30 tablet, Rfl: 11     atorvastatin (LIPITOR) 10 MG tablet, Take 1 tablet (10 mg) by mouth daily, Disp: 90 tablet, Rfl: 3     blood glucose (NO BRAND SPECIFIED) test strip, Use to test blood sugar 1 times daily or as directed.  To accompany: Blood Glucose Monitor Brands: per insurance., Disp: 100 strip, Rfl: 6     blood glucose calibration (NO BRAND SPECIFIED) solution, To accompany: Blood Glucose Monitor Brands: per insurance., Disp: 100 each, Rfl: 3     blood glucose monitoring (ACCU-CHEK RENY PLUS) test strip, Use to test blood sugar 2 times daily or as directed., Disp: 200 strip, Rfl: 11     blood glucose monitoring (ACCU-CHEK FASTCLIX) lancets, Use to test blood sugar 2 times daily or as directed., Disp: 102 each, Rfl: 11     blood glucose monitoring (NO BRAND SPECIFIED) meter device kit, Use to test blood sugar 1 times daily or as directed. Preferred blood glucose meter OR supplies to accompany: Blood Glucose Monitor Brands: per insurance., Disp: 1 kit, Rfl: 0     Cyanocobalamin (B-12 PO), , Disp: , Rfl:      diltiazem ER (TIAZAC) 360 MG 24 hr ER beaded capsule, TAKE ONE CAPSULE BY MOUTH ONCE DAILY, Disp: 90 capsule, Rfl: 3     folic acid 800 MCG TABS, , Disp: , Rfl:      gabapentin (NEURONTIN) 300 MG capsule, Take 300 mg by mouth daily, Disp: , Rfl:      IRON PO, , Disp: , Rfl:      levothyroxine (SYNTHROID/LEVOTHROID) 125 MCG tablet, Take 1 tablet (125 mcg) by mouth daily, Disp: 90 tablet, Rfl: 0     lisinopril (ZESTRIL) 20 MG tablet, Take 1 tablet (20 mg) by mouth daily, Disp: 90 tablet, Rfl: 3     metFORMIN (GLUCOPHAGE XR) 500 MG 24 hr tablet, TAKE TWO TABLETS BY MOUTH TWICE A DAY, Disp: 360 tablet, Rfl: 3     thin (NO BRAND SPECIFIED) lancets, Use with lanceting device. To accompany: Blood Glucose Monitor Brands: per insurance., Disp: 100 each, Rfl: 6     tirzepatide (MOUNJARO) 2.5 MG/0.5ML pen, Inject 2.5 mg Subcutaneous every 7 days, Disp: 2 mL, Rfl: 0    Allergies -   Allergies   Allergen Reactions     Penicillins      Sulfa Antibiotics Unknown       Social History -   Social History     Socioeconomic History     Marital status:    Occupational History     Comment: [employer]   Tobacco Use     Smoking status:  Former     Packs/day: 0.00     Years: 0.00     Pack years: 0.00     Types: Cigarettes, Other     Start date: 1975     Quit date: 2015     Years since quittin.3     Smokeless tobacco: Never     Tobacco comments:     I use Ecig   Vaping Use     Vaping status: Every Day     Substances: Nicotine, Flavoring     Devices: Refillable tank   Substance and Sexual Activity     Alcohol use: Yes     Alcohol/week: 0.0 standard drinks of alcohol     Drug use: No     Sexual activity: Not Currently     Partners: Male     Birth control/protection: None   Other Topics Concern     Parent/sibling w/ CABG, MI or angioplasty before 65F 55M? No   Social History Narrative    ENVIRONMENTAL HISTORY: [place] The home is heated with a forced air and gas furnace. They do have central air conditioning. The patient's bedroom is furnished with carpeting in bedroom.  Pets inside the house include 1 cat(s). There is history of cockroach or mice infestation. There is/are 0 smokers in the house.  The house does not have a damp basement.      Social Determinants of Health     Financial Resource Strain: Low Risk  (2020)    Overall Financial Resource Strain (CARDIA)      Difficulty of Paying Living Expenses: Not hard at all   Food Insecurity: No Food Insecurity (2020)    Hunger Vital Sign      Worried About Running Out of Food in the Last Year: Never true      Ran Out of Food in the Last Year: Never true   Transportation Needs: No Transportation Needs (2020)    PRAPARE - Transportation      Lack of Transportation (Medical): No      Lack of Transportation (Non-Medical): No   Stress: No Stress Concern Present (2020)    [employer] - Occupational Stress Questionnaire      Feeling of Stress : Only a little   Social Connections: Unknown (2020)    Social Connection and Isolation Panel [NHANES]      Frequency of Communication with Friends and Family: More  "than three times a week   Intimate Partner Violence: Unknown (5/13/2020)    Humiliation, Afraid, Rape, and Kick questionnaire      Fear of Current or Ex-Partner: No       Family History -   Family History   Problem Relation Age of Onset     Colon Cancer Father      Other Cancer Father         liver     Other Cancer Mother      Other Cancer Brother      Skin Cancer Sister      Asthma No family hx of      Allergic rhinitis No family hx of        Review of Systems - As per HPI and PMHx, otherwise review of system review of the head and neck negative. Otherwise 10+ review of system is negative    Physical Exam  /68   Temp (!) 96.1  F (35.6  C) (Temporal)   Ht 1.74 m (5' 8.5\")   Wt 107.5 kg (237 lb)   BMI 35.51 kg/m    BMI: Body mass index is 35.51 kg/m .    General - The patient is well nourished and well developed, and appears to have good nutritional status.  Alert and oriented to person and place, answers questions and cooperates with examination appropriately.    SKIN - No suspicious lesions or rashes.  Respiration - No respiratory distress.  Head and Face - Normocephalic and atraumatic, with no gross asymmetry noted of the contour of the facial features.  The facial nerve is intact, with strong symmetric movements.    Voice and Breathing - The patient was breathing comfortably without the use of accessory muscles. The patients voice was clear and strong, and had appropriate pitch and quality.    Ears - Bilateral pinna and EACs with normal appearing overlying skin.  Left tympanic membrane is clear with severe myringosclerosis and tympanosclerosis.  On the right side tympanic membrane is thickened superiorly in the area of the pars tensa and in the area of pars flaccida more thickening mild erythema but no wetness noted.  No postauricular tenderness or erythema noted on either side.  Eyes - Extraocular movements intact.  Sclera were not icteric or injected, conjunctiva were pink and moist.    Mouth - " Examination of the oral cavity showed pink, healthy oral mucosa. No lesions or ulcerations noted.  The tongue was mobile and midline, and the dentition were in good condition.      Throat - The walls of the oropharynx were smooth, pink, moist, symmetric, and had no lesions or ulcerations.  The tonsillar pillars and soft palate were symmetric.  The uvula was midline on elevation.    Neck - Normal midline excursion of the laryngotracheal complex during swallowing.  Full range of motion on passive movement.  Palpation of the occipital, submental, submandibular, internal jugular chain, and supraclavicular nodes did not demonstrate any abnormal lymph nodes or masses.  The carotid pulse was palpable bilaterally.  Palpation of the thyroid was soft and smooth, with no nodules or goiter appreciated.  The trachea was mobile and midline.    Nose - External contour is symmetric, no gross deflection or scars.  Nasal mucosa is pink and moist with no abnormal mucus.  The septum was midline and non-obstructive, turbinates of normal size and position.  No polyps, masses, or purulence noted on examination.    Neuro - Nonfocal neuro exam is normal, CN 2 through 12 intact, normal gait and muscle tone.      Performed in clinic today:  No procedures preformed in clinic today      A/P - Wendy Dodd is a 62 year old female with chronic otitis media on the right possible mastoiditis.  We will do about cholesteatoma.  CT of temporal bones will be obtained.  In the meantime we will start Ciprodex drops on the right side.  She will stop the drops at least 24 hours before CT scan to make sure clear images are obtained.  Patient will see us back in a few weeks to discuss the results of the CT as well as her response to Ciprodex.      Figueroa Bobo MD        Again, thank you for allowing me to participate in the care of your patient.        Sincerely,        Figueroa Bobo MD, MD

## 2023-05-24 NOTE — PROGRESS NOTES
AUDIOLOGY REPORT:    Patient was referred from ENT by Dr. Bobo for audiology evaluation. The patient reports that she has been having right ear drainage for around five months. It has gotten a little bit better but has not gone away. She also notes ear popping. The patient reports decreased hearing in her right ear since she started having drainage, and she has some concerns about hearing in general. She reports that she has slight pain in the right ear today but does not usually have ear pain or pressure. The patient reports that she does not have tinnitus, though she can sometimes hear her heartbeat or other sounds in her head. The patient reports a history of surgery in both ears, including surgery to reconstruct the bones in her right ear, which she notes was done with a graft and not with a prosthesis. She also reports a history of bilateral ear tubes as well as another surgery in the left ear. The patient reports that she has not been having a lot of ear infections. She reports that she saw urgent care when she started having drainage and was treated with oral antibiotics and ear drops.    Testing:    Otoscopy:   Otoscopic exam indicates drainage in the right ear. The left ear is clear.    Patient was returned to ENT for follow up. No testing was completed today.    Emely Brown, CCC-A  Licensed Audiologist #[id number]  5/24/2023

## 2023-05-30 ENCOUNTER — HOSPITAL ENCOUNTER (OUTPATIENT)
Dept: CT IMAGING | Facility: CLINIC | Age: 63
Discharge: HOME | End: 2023-05-30
Attending: OTOLARYNGOLOGY | Admitting: OTOLARYNGOLOGY
Payer: COMMERCIAL

## 2023-05-30 DIAGNOSIS — H66.91 CHRONIC OTITIS MEDIA OF RIGHT EAR: ICD-10-CM

## 2023-05-30 PROCEDURE — 70480 CT ORBIT/EAR/FOSSA W/O DYE: CPT

## 2023-06-22 ENCOUNTER — OFFICE VISIT (OUTPATIENT)
Dept: OTOLARYNGOLOGY | Facility: CLINIC | Age: 63
End: 2023-06-22
Payer: COMMERCIAL

## 2023-06-22 VITALS — OXYGEN SATURATION: 97 % | HEART RATE: 63 BPM

## 2023-06-22 DIAGNOSIS — H71.91 CHOLESTEATOMA, RIGHT: Primary | ICD-10-CM

## 2023-06-22 PROCEDURE — 99213 OFFICE O/P EST LOW 20 MIN: CPT | Performed by: OTOLARYNGOLOGY

## 2023-06-22 RX ORDER — OFLOXACIN 3 MG/ML
5 SOLUTION AURICULAR (OTIC) DAILY
Qty: 10 ML | Refills: 0 | Status: SHIPPED | OUTPATIENT
Start: 2023-06-22 | End: 2023-07-22

## 2023-06-22 ASSESSMENT — PAIN SCALES - GENERAL: PAINLEVEL: NO PAIN (0)

## 2023-06-22 NOTE — LETTER
6/22/2023         RE: Wendy Dodd  [address]   [place] [address]        Dear Colleague,    Thank you for referring your patient, Wendy Dodd, to the [facility]. Please see a copy of my visit note below.    History of Present Illness - Wendy Dodd is a 62 year old female presents for evaluation of   Draining right ear.  Ciprodex drops have been helping dry up the ear but still like it to drain.  It does not hurt in any way.  CT OF THE TEMPORAL BONES WITHOUT CONTRAST  5/30/2023 1:56 PM      HISTORY: Chronic otitis media of right ear.     COMPARISON: None.     TECHNIQUE: Thin-section axial CT images of the skull base were  acquired without intravenous contrast. Thin-section coronal  reconstructions were created from the axial source images.     FINDINGS:  Right Temporal Bone: The external auditory canal is normal. There is  thickening and medial retraction of the tympanic membrane. There is a  questionable tympanostomy tube in the right TM. The auditory ossicles  are grossly normal in contour and alignment but are surrounded by soft  tissue debris. The middle ear cavity is partially opacified by soft  tissue debris. There is sclerosis of the mastoid air cells but the  remaining mastoid cells are well aerated. The internal auditory canal,  cochlea, semicircular canals, vestibular aqueduct and bony canal for  the facial nerve are within normal limits. The scutum is minimally  blunted. There is dehiscence of the tegmen tympani.     Left Temporal Bone:  The external auditory canal is unremarkable.  There is thickening and medial retraction of the tympanic membrane.  There is questionable perforation of the tympanic membrane. There is  dislocation of the incus from the malleus. The footplate of the stapes  appears to approximate the oval window. There is questionable  dislocation of the incudostapedial junction. The middle ear cavity has  minimal soft tissue  density debris within it but is otherwise well  aerated. There is partial opacification of the sclerotic mastoid air  cells by soft tissue density debris. The internal auditory canal,  cochlea, semicircular canals, vestibular aqueduct and bony canal for  the facial nerve are within normal limits. The scutum is blunted. The  tegmen tympani is intact.                                                                      IMPRESSION:  1. Soft tissue density debris with bony remodeling in the middle ear  cavities bilaterally raising the question of cholesteatomas on one or  both sides.  2. Dehiscence of the tegmen tympani on the right.  3. Blunting of the scuta bilaterally.  4. Dislocation of the ossicular chain on the left.  5. Thickening of the tympanic membranes bilaterally.  6. Questionable tympanostomy tube on the right.    We discussed the results of CT scan with the patient.  Past Medical History -   Past Medical History:   Diagnosis Date     Cancer (H)      Cerebral infarction (H)      Diabetes (H)      Hypertension      Sleep apnea      Thyroid disease      Type 2 diabetes mellitus without complication, without long-term current use of insulin (H)        Current Medications -   Current Outpatient Medications:      alcohol swab prep pads, Use to swab area of injection/shaka as directed., Disp: 100 each, Rfl: 3     aspirin 81 MG tablet, Take 1 tablet (81 mg) by mouth daily, Disp: 30 tablet, Rfl: 11     atorvastatin (LIPITOR) 10 MG tablet, Take 1 tablet (10 mg) by mouth daily, Disp: 90 tablet, Rfl: 3     blood glucose (NO BRAND SPECIFIED) test strip, Use to test blood sugar 1 times daily or as directed. To accompany: Blood Glucose Monitor Brands: per insurance., Disp: 100 strip, Rfl: 6     blood glucose calibration (NO BRAND SPECIFIED) solution, To accompany: Blood Glucose Monitor Brands: per insurance., Disp: 100 each, Rfl: 3     blood glucose monitoring (ACCU-CHEK RENY PLUS) test strip, Use to test blood sugar 2  times daily or as directed., Disp: 200 strip, Rfl: 11     blood glucose monitoring (ACCU-CHEK FASTCLIX) lancets, Use to test blood sugar 2 times daily or as directed., Disp: 102 each, Rfl: 11     blood glucose monitoring (NO BRAND SPECIFIED) meter device kit, Use to test blood sugar 1 times daily or as directed. Preferred blood glucose meter OR supplies to accompany: Blood Glucose Monitor Brands: per insurance., Disp: 1 kit, Rfl: 0     Cyanocobalamin (B-12 PO), , Disp: , Rfl:      diltiazem ER (TIAZAC) 360 MG 24 hr ER beaded capsule, TAKE ONE CAPSULE BY MOUTH ONCE DAILY, Disp: 90 capsule, Rfl: 3     folic acid 800 MCG TABS, , Disp: , Rfl:      gabapentin (NEURONTIN) 300 MG capsule, Take 300 mg by mouth daily, Disp: , Rfl:      IRON PO, , Disp: , Rfl:      levothyroxine (SYNTHROID/LEVOTHROID) 125 MCG tablet, Take 1 tablet (125 mcg) by mouth daily, Disp: 90 tablet, Rfl: 0     lisinopril (ZESTRIL) 20 MG tablet, Take 1 tablet (20 mg) by mouth daily, Disp: 90 tablet, Rfl: 3     metFORMIN (GLUCOPHAGE XR) 500 MG 24 hr tablet, TAKE TWO TABLETS BY MOUTH TWICE A DAY, Disp: 360 tablet, Rfl: 3     thin (NO BRAND SPECIFIED) lancets, Use with lanceting device. To accompany: Blood Glucose Monitor Brands: per insurance., Disp: 100 each, Rfl: 6     tirzepatide (MOUNJARO) 2.5 MG/0.5ML pen, Inject 2.5 mg Subcutaneous every 7 days, Disp: 2 mL, Rfl: 0    Allergies -   Allergies   Allergen Reactions     Penicillins      Sulfa Antibiotics Unknown       Social History -   Social History     Socioeconomic History     Marital status:      Spouse name: None     Number of children: None     Years of education: None     Highest education level: None   Occupational History     Comment: [employer]   Tobacco Use     Smoking status: Former     Packs/day: 0.00     Years: 0.00     Pack years: 0.00     Types: Cigarettes, Other     Start date: 1975     Quit date: 2015     Years since quittin.4     Smokeless tobacco: Never      Tobacco comments:     I use Ecig   Vaping Use     Vaping Use: Every day     Substances: Nicotine, Flavoring     Devices: Refillable tank   Substance and Sexual Activity     Alcohol use: Yes     Alcohol/week: 0.0 standard drinks of alcohol     Drug use: No     Sexual activity: Not Currently     Partners: Male     Birth control/protection: None   Other Topics Concern     Parent/sibling w/ CABG, MI or angioplasty before 65F 55M? No   Social History Narrative    ENVIRONMENTAL HISTORY: [place] is [place] with a forced air and gas furnace. They do have central air conditioning. The patient's bedroom is furnished with carpeting in bedroom.  Pets inside the house include 1 cat(s). There is history of cockroach or mice infestation. There is/are 0 smokers in the house.  The house does not have a damp basement.      Social Determinants of Health     Financial Resource Strain: Low Risk  (5/13/2020)    Overall Financial Resource Strain (CARDIA)      Difficulty of Paying Living Expenses: Not hard at all   Food Insecurity: No Food Insecurity (5/13/2020)    Hunger Vital Sign      Worried About Running Out of Food in the Last Year: Never true      Ran Out of Food in the Last Year: Never true   Transportation Needs: No Transportation Needs (5/13/2020)    PRAPARE - Transportation      Lack of Transportation (Medical): No      Lack of Transportation (Non-Medical): No   Stress: No Stress Concern Present (5/13/2020)    [employer] [employer] - Occupational Stress Questionnaire      Feeling of Stress : Only a little   Social Connections: Unknown (5/13/2020)    Social Connection and Isolation Panel [NHANES]      Frequency of Communication with Friends and Family: More than three times a week   Intimate Partner Violence: Unknown (5/13/2020)    Humiliation, Afraid, Rape, and Kick questionnaire      Fear of Current or Ex-Partner: No       Family History -   Family History    Problem Relation Age of Onset     Colon Cancer Father      Other Cancer Father         liver     Other Cancer Mother      Other Cancer Brother      Skin Cancer Sister      Asthma No family hx of      Allergic rhinitis No family hx of        Review of Systems - As per HPI and PMHx, otherwise review of system review of the head and neck negative. Otherwise 10+ review systems negative.    Physical Exam  Pulse 63   SpO2 97%   BMI: There is no height or weight on file to calculate BMI.    General - The patient is well nourished and well developed, and appears to have good nutritional status.  Alert and oriented to person and place, answers questions and cooperates with examination appropriately.    SKIN - No suspicious lesions or rashes.  Respiration - No respiratory distress.  Head and Face - Normocephalic and atraumatic, with no gross asymmetry noted of the contour of the facial features.  The facial nerve is intact, with strong symmetric movements.    Voice and Breathing - The patient was breathing comfortably without the use of accessory muscles. The patients voice was clear and strong, and had appropriate pitch and quality.    Ears - Bilateral pinna and EACs with normal appearing overlying skin.  I examined the ears under the microscope.  On the left side indeed some myringosclerosis is appreciated with thickened drum but no active discharge and a clear canal.  On the right side even though there is no active drainage in the canal is clear and dry posterior superior quadrant has an area of thickening whitish prominent material possible cholesteatoma.  Eyes - Extraocular movements intact.  Sclera were not icteric or injected, conjunctiva were pink and moist.        Nose - External contour is symmetric, no gross deflection or scars.  Nasal mucosa is pink and moist with no abnormal mucus.  The septum was midline and non-obstructive, turbinates of normal size and position.  No polyps, masses, or purulence noted on  examination.    Neuro - Nonfocal neuro exam is normal, CN 2 through 12 intact, normal gait and muscle tone.      Performed in clinic today:  No procedures preformed in clinic today          A/P - Wendy Dodd is a 62 year old female Patient presents with:  ear check and ct results        We discussed all findings with the patient.  Certainly there is concern for possible cholesteatoma in the attic on the right side.  We will give patient more Floxin drops to avoid potential infection and therefore expansion of cholesteatoma.  Patient will be evaluated at [facility] by otologist for further care.  Necessary hearing testing will be obtained at the [place].          Figueroa Bobo MD        Again, thank you for allowing me to participate in the care of your patient.        Sincerely,        Figueroa Bobo MD, MD

## 2023-06-22 NOTE — PROGRESS NOTES
History of Present Illness - Wendy Dodd is a 62 year old female presents for evaluation of   Draining right ear.  Ciprodex drops have been helping dry up the ear but still like it to drain.  It does not hurt in any way.  CT OF THE TEMPORAL BONES WITHOUT CONTRAST  5/30/2023 1:56 PM      HISTORY: Chronic otitis media of right ear.     COMPARISON: None.     TECHNIQUE: Thin-section axial CT images of the skull base were  acquired without intravenous contrast. Thin-section coronal  reconstructions were created from the axial source images.     FINDINGS:  Right Temporal Bone: The external auditory canal is normal. There is  thickening and medial retraction of the tympanic membrane. There is a  questionable tympanostomy tube in the right TM. The auditory ossicles  are grossly normal in contour and alignment but are surrounded by soft  tissue debris. The middle ear cavity is partially opacified by soft  tissue debris. There is sclerosis of the mastoid air cells but the  remaining mastoid cells are well aerated. The internal auditory canal,  cochlea, semicircular canals, vestibular aqueduct and bony canal for  the facial nerve are within normal limits. The scutum is minimally  blunted. There is dehiscence of the tegmen tympani.     Left Temporal Bone:  The external auditory canal is unremarkable.  There is thickening and medial retraction of the tympanic membrane.  There is questionable perforation of the tympanic membrane. There is  dislocation of the incus from the malleus. The footplate of the stapes  appears to approximate the oval window. There is questionable  dislocation of the incudostapedial junction. The middle ear cavity has  minimal soft tissue density debris within it but is otherwise well  aerated. There is partial opacification of the sclerotic mastoid air  cells by soft tissue density debris. The internal auditory canal,  cochlea, semicircular canals, vestibular aqueduct and bony canal for  the  facial nerve are within normal limits. The scutum is blunted. The  tegmen tympani is intact.                                                                      IMPRESSION:  1. Soft tissue density debris with bony remodeling in the middle ear  cavities bilaterally raising the question of cholesteatomas on one or  both sides.  2. Dehiscence of the tegmen tympani on the right.  3. Blunting of the scuta bilaterally.  4. Dislocation of the ossicular chain on the left.  5. Thickening of the tympanic membranes bilaterally.  6. Questionable tympanostomy tube on the right.    We discussed the results of CT scan with the patient.  Past Medical History -   Past Medical History:   Diagnosis Date     Cancer (H)      Cerebral infarction (H)      Diabetes (H)      Hypertension      Sleep apnea      Thyroid disease      Type 2 diabetes mellitus without complication, without long-term current use of insulin (H)        Current Medications -   Current Outpatient Medications:      alcohol swab prep pads, Use to swab area of injection/shaka as directed., Disp: 100 each, Rfl: 3     aspirin 81 MG tablet, Take 1 tablet (81 mg) by mouth daily, Disp: 30 tablet, Rfl: 11     atorvastatin (LIPITOR) 10 MG tablet, Take 1 tablet (10 mg) by mouth daily, Disp: 90 tablet, Rfl: 3     blood glucose (NO BRAND SPECIFIED) test strip, Use to test blood sugar 1 times daily or as directed. To accompany: Blood Glucose Monitor Brands: per insurance., Disp: 100 strip, Rfl: 6     blood glucose calibration (NO BRAND SPECIFIED) solution, To accompany: Blood Glucose Monitor Brands: per insurance., Disp: 100 each, Rfl: 3     blood glucose monitoring (ACCU-CHEK RENY PLUS) test strip, Use to test blood sugar 2 times daily or as directed., Disp: 200 strip, Rfl: 11     blood glucose monitoring (ACCU-CHEK FASTCLIX) lancets, Use to test blood sugar 2 times daily or as directed., Disp: 102 each, Rfl: 11     blood glucose monitoring (NO BRAND SPECIFIED) meter device kit,  Use to test blood sugar 1 times daily or as directed. Preferred blood glucose meter OR supplies to accompany: Blood Glucose Monitor Brands: per insurance., Disp: 1 kit, Rfl: 0     Cyanocobalamin (B-12 PO), , Disp: , Rfl:      diltiazem ER (TIAZAC) 360 MG 24 hr ER beaded capsule, TAKE ONE CAPSULE BY MOUTH ONCE DAILY, Disp: 90 capsule, Rfl: 3     folic acid 800 MCG TABS, , Disp: , Rfl:      gabapentin (NEURONTIN) 300 MG capsule, Take 300 mg by mouth daily, Disp: , Rfl:      IRON PO, , Disp: , Rfl:      levothyroxine (SYNTHROID/LEVOTHROID) 125 MCG tablet, Take 1 tablet (125 mcg) by mouth daily, Disp: 90 tablet, Rfl: 0     lisinopril (ZESTRIL) 20 MG tablet, Take 1 tablet (20 mg) by mouth daily, Disp: 90 tablet, Rfl: 3     metFORMIN (GLUCOPHAGE XR) 500 MG 24 hr tablet, TAKE TWO TABLETS BY MOUTH TWICE A DAY, Disp: 360 tablet, Rfl: 3     thin (NO BRAND SPECIFIED) lancets, Use with lanceting device. To accompany: Blood Glucose Monitor Brands: per insurance., Disp: 100 each, Rfl: 6     tirzepatide (MOUNJARO) 2.5 MG/0.5ML pen, Inject 2.5 mg Subcutaneous every 7 days, Disp: 2 mL, Rfl: 0    Allergies -   Allergies   Allergen Reactions     Penicillins      Sulfa Antibiotics Unknown       Social History -   Social History     Socioeconomic History     Marital status:      Spouse name: None     Number of children: None     Years of education: None     Highest education level: None   Occupational History     Comment: [employer]   Tobacco Use     Smoking status: Former     Packs/day: 0.00     Years: 0.00     Pack years: 0.00     Types: Cigarettes, Other     Start date: 1975     Quit date: 2015     Years since quittin.4     Smokeless tobacco: Never     Tobacco comments:     I use Ecig   Vaping Use     Vaping Use: Every day     Substances: Nicotine, Flavoring     Devices: Refillable tank   Substance and Sexual Activity     Alcohol use: Yes     Alcohol/week: 0.0 standard drinks of alcohol     Drug use: No      Sexual activity: Not Currently     Partners: Male     Birth control/protection: None   Other Topics Concern     Parent/sibling w/ CABG, MI or angioplasty before 65F 55M? No   Social History Narrative    ENVIRONMENTAL HISTORY: [place] The home is heated with a forced air and gas furnace. They do have central air conditioning. The patient's bedroom is furnished with carpeting in bedroom.  Pets inside the house include 1 cat(s). There is history of cockroach or mice infestation. There is/are 0 smokers in the house.  The house does not have a damp basement.      Social Determinants of Health     Financial Resource Strain: Low Risk  (5/13/2020)    Overall Financial Resource Strain (CARDIA)      Difficulty of Paying Living Expenses: Not hard at all   Food Insecurity: No Food Insecurity (5/13/2020)    Hunger Vital Sign      Worried About Running Out of Food in the Last Year: Never true      Ran Out of Food in the Last Year: Never true   Transportation Needs: No Transportation Needs (5/13/2020)    PRAPARE - Transportation      Lack of Transportation (Medical): No      Lack of Transportation (Non-Medical): No   Stress: No Stress Concern Present (5/13/2020)    [employer] [place] [employer] - Occupational Stress Questionnaire      Feeling of Stress : Only a little   Social Connections: Unknown (5/13/2020)    Social Connection and Isolation Panel [NHANES]      Frequency of Communication with Friends and Family: More than three times a week   Intimate Partner Violence: Unknown (5/13/2020)    Humiliation, Afraid, Rape, and Kick questionnaire      Fear of Current or Ex-Partner: No       Family History -   Family History   Problem Relation Age of Onset     Colon Cancer Father      Other Cancer Father         liver     Other Cancer Mother      Other Cancer Brother      Skin Cancer Sister      Asthma No family hx of      Allergic rhinitis No family hx of        Review of Systems -  As per HPI and PMHx, otherwise review of system review of the head and neck negative. Otherwise 10+ review systems negative.    Physical Exam  Pulse 63   SpO2 97%   BMI: There is no height or weight on file to calculate BMI.    General - The patient is well nourished and well developed, and appears to have good nutritional status.  Alert and oriented to person and place, answers questions and cooperates with examination appropriately.    SKIN - No suspicious lesions or rashes.  Respiration - No respiratory distress.  Head and Face - Normocephalic and atraumatic, with no gross asymmetry noted of the contour of the facial features.  The facial nerve is intact, with strong symmetric movements.    Voice and Breathing - The patient was breathing comfortably without the use of accessory muscles. The patients voice was clear and strong, and had appropriate pitch and quality.    Ears - Bilateral pinna and EACs with normal appearing overlying skin.  I examined the ears under the microscope.  On the left side indeed some myringosclerosis is appreciated with thickened drum but no active discharge and a clear canal.  On the right side even though there is no active drainage in the canal is clear and dry posterior superior quadrant has an area of thickening whitish prominent material possible cholesteatoma.  Eyes - Extraocular movements intact.  Sclera were not icteric or injected, conjunctiva were pink and moist.        Nose - External contour is symmetric, no gross deflection or scars.  Nasal mucosa is pink and moist with no abnormal mucus.  The septum was midline and non-obstructive, turbinates of normal size and position.  No polyps, masses, or purulence noted on examination.    Neuro - Nonfocal neuro exam is normal, CN 2 through 12 intact, normal gait and muscle tone.      Performed in clinic today:  No procedures preformed in clinic today          A/P - Wendy Bermudez Yousif is a 62 year old female Patient presents  with:  ear check and ct results        We discussed all findings with the patient.  Certainly there is concern for possible cholesteatoma in the attic on the right side.  We will give patient more Floxin drops to avoid potential infection and therefore expansion of cholesteatoma.  Patient will be evaluated at [facility] by otologist for further care.  Necessary hearing testing will be obtained at the [place].          Figueroa Bobo MD

## 2023-06-28 NOTE — TELEPHONE ENCOUNTER
"FUTURE VISIT INFORMATION:      FUTURE VISIT INFORMATION:    Date:  7/27/23    Time:   8 AM    [place] [facility] [place]ERRAL INFORMATION:    Referring provider: Figueroa Bobo MD    Referring providers clinic: [facility]    Reason for visit/diagnosis:  Per Pt, dx Cholesteatoma, right [H71.91] referral from Figueroa Bobo MD recs in [facility]    RECORDS REQUESTED FROM:       Clinic name Comments Records Status Imaging Status   [facility] 5/24/23 +  6/22/23 note- Figueroa Bobo MD   [facility] 5/24/23 note -Elise Colon [facility]    Imaging CT temporal 5/30/23 Epic Pacs                 July 11, 2023 at 12:00 PM - Attempted to call patient to discuss ear surgery records. Phone ring and answered but went silent. Will tried again. Send a [employer] message -[employer]  July 11, 2023 at 1:15 PM - Received from [facility] 2015 office visit notes, no op report however notation \"Patient has a past history of bilateral ear surgery in the 1990s consisting of cartilage graft tympanoplasty's and a left ossiculoplasty.\" -send to scan -Liz  " [facility]  [address]  [place] [address]  Phone:  [phone]  Fax:  [phone]                                    Lety Luna   MRN: [id number]    Department:  [facility]   Date of Visit:  5/13/2019           After Visit Summary Signature Page    I have received my discharge instructions, and my questions have been answered. I have discussed any challenges I see with this plan with the nurse or doctor.    ..........................................................................................................................................  Patient/Patient Representative Signature      ..........................................................................................................................................  Patient Representative Print Name and Relationship to Patient    ..................................................               ................................................  Date                                   Time    ..........................................................................................................................................  Reviewed by Signature/Title    ...................................................              ..............................................  Date                                               Time          22EPIC Rev 08/18

## 2023-07-12 ENCOUNTER — OFFICE VISIT (OUTPATIENT)
Dept: AUDIOLOGY | Facility: OTHER | Age: 63
End: 2023-07-12
Payer: COMMERCIAL

## 2023-07-12 DIAGNOSIS — H90.6 MIXED HEARING LOSS, BILATERAL: Primary | ICD-10-CM

## 2023-07-12 PROCEDURE — 92550 TYMPANOMETRY & REFLEX THRESH: CPT | Performed by: AUDIOLOGIST

## 2023-07-12 PROCEDURE — 92557 COMPREHENSIVE HEARING TEST: CPT | Performed by: AUDIOLOGIST

## 2023-07-12 NOTE — PROGRESS NOTES
AUDIOLOGY REPORT:    Patient was referred from ENT by Dr. Ford for audiology evaluation. The patient was last seen on 5/24/2023 but testing could not be completed at that time due to drainage in the right ear. Since then the patient was treated with ear drops and was referred to otology for right cholesteatoma. The patient reports a history of surgery in both ears. She reports that her left ear had previously been the worse ear, but they are likely similar now. She has not noted any changes since May and has still been having ear drainage.     Testing:    Otoscopy:   Otoscopic exam indicates debris in the right ear. The left ear is clear.     Tympanograms:    RIGHT: restricted eardrum mobility (type B)     LEFT:   negative pressure and restricted eardrum mobility     Reflexes (reported by stimulus ear):  RIGHT: Ipsilateral is absent at frequencies tested  RIGHT: Contralateral is absent at frequencies tested  LEFT:   Ipsilateral is absent at frequencies tested  LEFT:   Contralateral is absent at frequencies tested    Thresholds:   Pure Tone Thresholds assessed using conventional audiometry with good reliability from 250-8000 Hz bilaterally using insert earphones and circumaural headphones     RIGHT:  mild to moderately severe mixed hearing loss    LEFT:    mild to moderate mixed hearing loss    Speech Reception Threshold:    RIGHT: 40 dB HL    LEFT:   45 dB HL  Results are in agreement with pure tone average.     Word Recognition Score:     RIGHT: 100% at 80 dB HL using NU-6 recorded word list.    LEFT:   96% at 85 dB HL using NU-6 recorded word list.    Discussed results with the patient.     The patient is scheduled to follow up with ENT on 7/27/2023.     Emely Brown, CCC-A  Licensed Audiologist #[id number]  7/12/2023

## 2023-07-17 DIAGNOSIS — E03.9 HYPOTHYROIDISM, UNSPECIFIED TYPE: ICD-10-CM

## 2023-07-17 RX ORDER — LEVOTHYROXINE SODIUM 125 UG/1
TABLET ORAL
Qty: 90 TABLET | Refills: 0 | Status: SHIPPED | OUTPATIENT
Start: 2023-07-17 | End: 2023-07-26

## 2023-07-18 ENCOUNTER — LAB (OUTPATIENT)
Dept: LAB | Facility: OTHER | Age: 63
End: 2023-07-18
Payer: COMMERCIAL

## 2023-07-18 DIAGNOSIS — E11.9 TYPE 2 DIABETES MELLITUS WITHOUT COMPLICATION, WITHOUT LONG-TERM CURRENT USE OF INSULIN (H): ICD-10-CM

## 2023-07-18 DIAGNOSIS — E03.9 HYPOTHYROIDISM, UNSPECIFIED TYPE: ICD-10-CM

## 2023-07-18 LAB
HBA1C MFR BLD: 7.2 % (ref 0–5.6)
TSH SERPL DL<=0.005 MIU/L-ACNC: 2.98 UIU/ML (ref 0.3–4.2)

## 2023-07-18 PROCEDURE — 83036 HEMOGLOBIN GLYCOSYLATED A1C: CPT

## 2023-07-18 PROCEDURE — 84443 ASSAY THYROID STIM HORMONE: CPT

## 2023-07-18 PROCEDURE — 36415 COLL VENOUS BLD VENIPUNCTURE: CPT

## 2023-07-26 ENCOUNTER — OFFICE VISIT (OUTPATIENT)
Dept: FAMILY MEDICINE | Facility: CLINIC | Age: 63
End: 2023-07-26
Payer: COMMERCIAL

## 2023-07-26 VITALS
DIASTOLIC BLOOD PRESSURE: 77 MMHG | BODY MASS INDEX: 32.66 KG/M2 | OXYGEN SATURATION: 93 % | WEIGHT: 218 LBS | TEMPERATURE: 97.2 F | HEART RATE: 77 BPM | RESPIRATION RATE: 18 BRPM | SYSTOLIC BLOOD PRESSURE: 134 MMHG

## 2023-07-26 DIAGNOSIS — E03.9 HYPOTHYROIDISM, UNSPECIFIED TYPE: ICD-10-CM

## 2023-07-26 DIAGNOSIS — E11.9 TYPE 2 DIABETES MELLITUS WITHOUT COMPLICATION, WITHOUT LONG-TERM CURRENT USE OF INSULIN (H): Primary | ICD-10-CM

## 2023-07-26 PROCEDURE — 36415 COLL VENOUS BLD VENIPUNCTURE: CPT | Performed by: PHYSICIAN ASSISTANT

## 2023-07-26 PROCEDURE — 80061 LIPID PANEL: CPT | Performed by: PHYSICIAN ASSISTANT

## 2023-07-26 PROCEDURE — 99213 OFFICE O/P EST LOW 20 MIN: CPT | Performed by: PHYSICIAN ASSISTANT

## 2023-07-26 RX ORDER — LEVOTHYROXINE SODIUM 125 UG/1
125 TABLET ORAL DAILY
Qty: 90 TABLET | Refills: 3 | Status: SHIPPED | OUTPATIENT
Start: 2023-07-26 | End: 2024-03-19

## 2023-07-26 ASSESSMENT — PAIN SCALES - GENERAL: PAINLEVEL: NO PAIN (0)

## 2023-07-26 NOTE — PROGRESS NOTES
Assessment & Plan     Type 2 diabetes mellitus without complication, without long-term current use of insulin (H)  She will continue with the metformin and making healthy changes.   The 20 pound weight loss has been great. A1C is at goal.   She will schedule an eye exam.   I will see her back again in April 2024.   Fasting tests to be completed prior to office visit.   - Lipid panel reflex to direct LDL Non-fasting; Future  - Lipid panel reflex to direct LDL Non-fasting    Hypothyroidism, unspecified type  Stable on the new dose of levothyroxine.   Lab results reviewed. Refills given   - levothyroxine (SYNTHROID/LEVOTHROID) 125 MCG tablet; Take 1 tablet (125 mcg) by mouth daily                 Kristen M. Kehr, PA-C  [facility]   Wendy is a 62 year old, presenting for the following health issues:  Diabetes      7/26/2023     1:51 PM   Additional Questions   Roomed by Salima   Accompanied by Self       Janet was seen in April.   Her A1C was above 8%, Mounjaro was added.   She is here today for follow up and had lab tests completed prior to her appointment.     She went home after her appointment and discussed the new medication (Mounjaro) with her sister. She learned that her sister had tried Ozempic and had signficant nausea and vomiting. Wendy decided to NOT take the Mounjaro since it made her nervous.   She has made lifestyle changes with diet and exercise and lost 20 pounds on her own.   Her A1C is down from 8/5% to 7.2%.     She is feeling good.   Her thyroid medication was also adjusted after her last appointment and she is taking 125 mcg of the levothyroxine.   Her TSH is back in normal range.       History of Present Illness       Diabetes:   She presents for follow up of diabetes.  She is checking home blood glucose one time daily.   She checks blood glucose before meals.  Blood glucose is sometimes over 200 and never under 70. She is aware of hypoglycemia symptoms  including none.    She has no concerns regarding her diabetes at this time.   She is not experiencing numbness or burning in feet, excessive thirst, blurry vision, weight changes or redness, sores or blisters on feet. The patient has had a diabetic eye exam in the last 12 months. Eye exam performed on catracho sure. Location of last eye exam [facility].        She eats 0-1 servings of fruits and vegetables daily.She consumes 0 sweetened beverage(s) daily.She exercises with enough effort to increase her heart rate 9 or less minutes per day.  She exercises with enough effort to increase her heart rate 3 or less days per week.   She is taking medications regularly.               Review of Systems   Constitutional, HEENT, cardiovascular, pulmonary, GI, , musculoskeletal, neuro, skin, endocrine and psych systems are negative, except as otherwise noted.      Objective    /77   Pulse 77   Temp 97.2  F (36.2  C) (Tympanic)   Resp 18   Wt 98.9 kg (218 lb)   SpO2 93%   BMI 32.66 kg/m    Body mass index is 32.66 kg/m .  Physical Exam   GENERAL: healthy, alert and no distress  SKIN: no suspicious lesions or rashes  NEURO: Normal strength and tone, mentation intact and speech normal  PSYCH: mentation appears normal, affect normal/bright  Diabetic foot exam: normal DP and PT pulses and no trophic changes or ulcerative lesions    Lab on 07/18/2023   Component Date Value Ref Range Status    TSH 07/18/2023 2.98  0.30 - 4.20 uIU/mL Final    Hemoglobin A1C 07/18/2023 7.2 (H)  0.0 - 5.6 % Final    Normal <5.7%   Prediabetes 5.7-6.4%    Diabetes 6.5% or higher     Note: Adopted from ADA consensus guidelines.

## 2023-07-27 ENCOUNTER — OFFICE VISIT (OUTPATIENT)
Dept: OTOLARYNGOLOGY | Facility: CLINIC | Age: 63
End: 2023-07-27
Attending: OTOLARYNGOLOGY
Payer: COMMERCIAL

## 2023-07-27 VITALS
TEMPERATURE: 97.8 F | HEIGHT: 68 IN | WEIGHT: 215 LBS | DIASTOLIC BLOOD PRESSURE: 76 MMHG | SYSTOLIC BLOOD PRESSURE: 131 MMHG | HEART RATE: 72 BPM | BODY MASS INDEX: 32.58 KG/M2 | OXYGEN SATURATION: 95 %

## 2023-07-27 DIAGNOSIS — H92.11 OTORRHEA, RIGHT: Primary | ICD-10-CM

## 2023-07-27 LAB
CHOLEST SERPL-MCNC: 120 MG/DL
HDLC SERPL-MCNC: 43 MG/DL
LDLC SERPL CALC-MCNC: 36 MG/DL
NONHDLC SERPL-MCNC: 77 MG/DL
TRIGL SERPL-MCNC: 206 MG/DL

## 2023-07-27 PROCEDURE — 92504 EAR MICROSCOPY EXAMINATION: CPT | Performed by: OTOLARYNGOLOGY

## 2023-07-27 PROCEDURE — 87070 CULTURE OTHR SPECIMN AEROBIC: CPT | Performed by: OTOLARYNGOLOGY

## 2023-07-27 PROCEDURE — 99213 OFFICE O/P EST LOW 20 MIN: CPT | Mod: 25 | Performed by: OTOLARYNGOLOGY

## 2023-07-27 PROCEDURE — 87102 FUNGUS ISOLATION CULTURE: CPT | Performed by: OTOLARYNGOLOGY

## 2023-07-27 PROCEDURE — 99000 SPECIMEN HANDLING OFFICE-LAB: CPT | Performed by: PATHOLOGY

## 2023-07-27 ASSESSMENT — PAIN SCALES - GENERAL: PAINLEVEL: NO PAIN (0)

## 2023-07-27 NOTE — PROGRESS NOTES
Wendy Dodd is seen in consultation from Dr. Bobo.  She is a 62 year old female being seen for possible right cholesteatoma. She reports a history of right tympanoplasty back in the early 1990s for a growth in her ear. The surgery went behind her ear. She then had a left tympanoplasty although she's unsure what that was for, she thinks it was more for a hole. The surgery was through her ear canal. Her left ear has always been her worse hearing ear and remained her worse hearing ear. She did not think the hearing on the right changed after surgery. Her symptom at that time on the right was fullness which changed with position changes. She did have ear infections as a child and had PE tubes but the infections stopped in childhood.    She began having right otorrhea this February/March although she wasn't seen until recently. She is currently on drops and does say the otorrhea is better as she now only has it at night when she turns her head. Her hearing is down on the right. No vertigo. Dr. Bobo had concern for cholesteatoma on the right.    Past Medical History:   Diagnosis Date     Cancer (H)      Cerebral infarction (H)      Diabetes (H)      Hypertension      Sleep apnea      Thyroid disease      Type 2 diabetes mellitus without complication, without long-term current use of insulin (H)        Past Surgical History:   Procedure Laterality Date     ABDOMEN SURGERY       APPENDECTOMY       BIOPSY       COLONOSCOPY       COLONOSCOPY N/A 2/22/2022    Procedure: COLONOSCOPY;  Surgeon: Devin Chawla MD;  Location:  GI     ENT SURGERY         Family History   Problem Relation Age of Onset     Colon Cancer Father      Other Cancer Father         liver     Other Cancer Mother      Other Cancer Brother      Skin Cancer Sister      Asthma No family hx of      Allergic rhinitis No family hx of        Social History     Tobacco Use     Smoking status: Former     Packs/day: 0.00     Years: 0.00      Pack years: 0.00     Types: Cigarettes, Other     Start date: 1975     Quit date: 2015     Years since quittin.5     Passive exposure: Past     Smokeless tobacco: Never     Tobacco comments:     I use Ecig   Vaping Use     Vaping Use: Every day     Substances: Nicotine, Flavoring     Devices: Refillable tank   Substance Use Topics     Alcohol use: Yes     Alcohol/week: 0.0 standard drinks of alcohol     Drug use: No       Physical examination:  Constitutional:  In no acute distress, appears stated age  Eyes:  Extraocular movements intact, no spontaneous nystagmus  Ears:  Both ears examined under the microscope.  Right ear canal wet but without obvious otorrhea, this was cultured and the skin of the canal is thin and easily sloughs off, TM intact with cartilage backing posteriorly, there is an epitympanic retraction pocket that is small/moderate in size, it is difficult to see the anterior TM as she has an overhanging canal and the cartilage is somewhat high but no obvious perforation. The left TM has a large sheet of cartilage posteriorly which makes it almost impossible to see the anterior TM due to the overhanging canal, no retraction noted, ear canal clear.  Respiratory:  No increased work of breathing, wheezing or stridor  Musculoskeletal:  Good upper extremity strength  Skin:  No rashes on the head and neck  Neurologic:  House Brackman 1/6 bilaterally, ambulating normally  Psychiatric:  Alert, normal affect, answering questions appropriately    Audiogram:  Outside audiogram was independently reviewed. Fairly symmetric mild/moderate to moderate mixed loss with normal downsloping to mild sensorineural loss with a 2kHz Carhart's notch bilaterally, excellent speech discrimination bilaterally, bilateral flat normal volume tympanograms.    CT: Outside temporal bone CT was independently reviewed. Right mastoid very sclerotic with only an open aerated antrum, middle ear partially opacified, epitympanum  opacified with a tegmen tympani dehiscence, malleus intact, incus likely replaced with either a hydroxyapatite prosthesis or an incus interposition, stapes not well visualized, otic capsule intact, facial nerve in its usual position. Left mastoid very sclerotic with an aerated antrum, middle ear also with a thick sheet of opacification along the TM and extending into the epitympanum along the malleus which may be a sheet of cartilage extending into the epitympanum, also no easily identifiable incus with possible incus interposition graft, stapes not well visualized, otic capsule intact, facial nerve in its normal position.    Outside records from Dr. Bobo were independently reviewed and summarized above.    Assessment and plan:  Right otorrhea which is only at night which has improved from the winter. I did not appreciate a perforation nor a significant retraction pocket where there may be fluid coming out of the middle ear so this may be an otitis externa. We will call with the culture results and get her on directed therapy. Given her prior history of surgery and the tegmen dehiscence, recommendation was made for MRI IACs to check what the opacification in the middle ear may be. We will do that the morning of her appointment in a month. We did discuss dry ear precautions for the right as well. She had her questions answered and was pleased she didn't seem to need immediate surgery.

## 2023-07-27 NOTE — LETTER
7/27/2023       RE: Wendy Dodd  [address] [facility] [address]     Dear Colleague,    Thank you for referring your patient, Wendy Dodd, to the [facility] [place] [facility]. Please see a copy of my visit note below.    Wendy Dodd is seen in consultation from Dr. Bobo.  She is a 62 year old female being seen for possible right cholesteatoma. She reports a history of right tympanoplasty back in the early 1990s for a growth in her ear. The surgery went behind her ear. She then had a left tympanoplasty although she's unsure what that was for, she thinks it was more for a hole. The surgery was through her ear canal. Her left ear has always been her worse hearing ear and remained her worse hearing ear. She did not think the hearing on the right changed after surgery. Her symptom at that time on the right was fullness which changed with position changes. She did have ear infections as a child and had PE tubes but the infections stopped in childhood.    She began having right otorrhea this February/March although she wasn't seen until recently. She is currently on drops and does say the otorrhea is better as she now only has it at night when she turns her head. Her hearing is down on the right. No vertigo. Dr. Bobo had concern for cholesteatoma on the right.    Past Medical History:   Diagnosis Date    Cancer (H)     Cerebral infarction (H)     Diabetes (H)     Hypertension     Sleep apnea     Thyroid disease     Type 2 diabetes mellitus without complication, without long-term current use of insulin (H)        Past Surgical History:   Procedure Laterality Date    ABDOMEN SURGERY      APPENDECTOMY      BIOPSY      COLONOSCOPY      COLONOSCOPY N/A 2/22/2022    Procedure: COLONOSCOPY;  Surgeon: Devin Chawla MD;  Location:  GI    ENT SURGERY         Family History   Problem Relation  Age of Onset    Colon Cancer Father     Other Cancer Father         liver    Other Cancer Mother     Other Cancer Brother     Skin Cancer Sister     Asthma No family hx of     Allergic rhinitis No family hx of        Social History     Tobacco Use    Smoking status: Former     Packs/day: 0.00     Years: 0.00     Pack years: 0.00     Types: Cigarettes, Other     Start date: 1975     Quit date: 2015     Years since quittin.5     Passive exposure: Past    Smokeless tobacco: Never    Tobacco comments:     I use Ecig   Vaping Use    Vaping Use: Every day    Substances: Nicotine, Flavoring    Devices: Refillable tank   Substance Use Topics    Alcohol use: Yes     Alcohol/week: 0.0 standard drinks of alcohol    Drug use: No       Physical examination:  Constitutional:  In no acute distress, appears stated age  Eyes:  Extraocular movements intact, no spontaneous nystagmus  Ears:  Both ears examined under the microscope.  Right ear canal wet but without obvious otorrhea, this was cultured and the skin of the canal is thin and easily sloughs off, TM intact with cartilage backing posteriorly, there is an epitympanic retraction pocket that is small/moderate in size, it is difficult to see the anterior TM as she has an overhanging canal and the cartilage is somewhat high but no obvious perforation. The left TM has a large sheet of cartilage posteriorly which makes it almost impossible to see the anterior TM due to the overhanging canal, no retraction noted, ear canal clear.  Respiratory:  No increased work of breathing, wheezing or stridor  Musculoskeletal:  Good upper extremity strength  Skin:  No rashes on the head and neck  Neurologic:  House Brackman 1/6 bilaterally, ambulating normally  Psychiatric:  Alert, normal affect, answering questions appropriately    Audiogram:  Outside audiogram was independently reviewed. Fairly symmetric mild/moderate to moderate mixed loss with normal downsloping to mild  sensorineural loss with a 2kHz Carhart's notch bilaterally, excellent speech discrimination bilaterally, bilateral flat normal volume tympanograms.    CT: Outside temporal bone CT was independently reviewed. Right mastoid very sclerotic with only an open aerated antrum, middle ear partially opacified, epitympanum opacified with a tegmen tympani dehiscence, malleus intact, incus likely replaced with either a hydroxyapatite prosthesis or an incus interposition, stapes not well visualized, otic capsule intact, facial nerve in its usual position. Left mastoid very sclerotic with an aerated antrum, middle ear also with a thick sheet of opacification along the TM and extending into the epitympanum along the malleus which may be a sheet of cartilage extending into the epitympanum, also no easily identifiable incus with possible incus interposition graft, stapes not well visualized, otic capsule intact, facial nerve in its normal position.    Outside records from Dr. Bobo were independently reviewed and summarized above.    Assessment and plan:  Right otorrhea which is only at night which has improved from the winter. I did not appreciate a perforation nor a significant retraction pocket where there may be fluid coming out of the middle ear so this may be an otitis externa. We will call with the culture results and get her on directed therapy. Given her prior history of surgery and the tegmen dehiscence, recommendation was made for MRI IACs to check what the opacification in the middle ear may be. We will do that the morning of her appointment in a month. We did discuss dry ear precautions for the right as well. She had her questions answered and was pleased she didn't seem to need immediate surgery.      Again, thank you for allowing me to participate in the care of your patient.      Sincerely,    Kim Ford MD

## 2023-07-27 NOTE — NURSING NOTE
"Chief Complaint   Patient presents with    Consult     Cholesteatoma     .Blood pressure 131/76, pulse 72, temperature 97.8  F (36.6  C), height 1.727 m (5' 8\"), weight 97.5 kg (215 lb), SpO2 95 %, not currently breastfeeding.  Moy Field LPN    "

## 2023-07-27 NOTE — PATIENT INSTRUCTIONS
1. You were seen in the ENT Clinic today by Dr. Ford.  If you have any questions or concerns after your appointment, please call   - Option 1: ENT Clinic: [phone]   - Option 2: Molly (Dr. Ford's Nurse): [phone]          Yesy (Dr. Ford's Nurse): [phone]     2.   Plan to return to clinic in 1 month with MRI before the visit    3. Right ear culture- will call with result    How to Contact Us:  Send a EventBoard message to your provider. Our team will respond to you via EventBoard. Occasionally, we will need to call you to get further information.  For urgent matters (Monday-Friday), call the ENT Clinic: [phone] and speak with a call center team member - they will route your call appropriately.   If you'd like to speak directly with a nurse, please find our contact information below. We do our best to check voicemail frequently throughout the day, and will work to call you back within 1-2 days. For urgent matters, please use the general clinic phone numbers listed above.        Molly QUINTANA LPN  [facility] - Otolaryngology

## 2023-07-29 LAB — BACTERIA SPEC CULT: NO GROWTH

## 2023-07-31 ENCOUNTER — MYC MEDICAL ADVICE (OUTPATIENT)
Dept: OTOLARYNGOLOGY | Facility: CLINIC | Age: 63
End: 2023-07-31
Payer: COMMERCIAL

## 2023-07-31 DIAGNOSIS — H92.11 OTORRHEA, RIGHT: Primary | ICD-10-CM

## 2023-07-31 RX ORDER — PREDNISOLONE ACETATE 10 MG/ML
SUSPENSION/ DROPS OPHTHALMIC
Qty: 10 ML | Refills: 1 | Status: ON HOLD | OUTPATIENT
Start: 2023-07-31 | End: 2023-11-08

## 2023-07-31 NOTE — TELEPHONE ENCOUNTER
Signed Prescriptions:                        Disp   Refills    prednisoLONE acetate (PRED FORTE) 1 % opht*10 mL  1        Sig: Instill 5 drops in the right ear twice daily until           follow up appointment  Authorizing Provider: BRUNO DOE  Ordering User: ANAYELI SOTO

## 2023-08-10 ENCOUNTER — ANCILLARY PROCEDURE (OUTPATIENT)
Dept: MAMMOGRAPHY | Facility: CLINIC | Age: 63
End: 2023-08-10
Attending: PHYSICIAN ASSISTANT
Payer: COMMERCIAL

## 2023-08-10 DIAGNOSIS — Z12.31 VISIT FOR SCREENING MAMMOGRAM: ICD-10-CM

## 2023-08-10 PROCEDURE — 77067 SCR MAMMO BI INCL CAD: CPT | Mod: TC | Performed by: RADIOLOGY

## 2023-08-20 DIAGNOSIS — L30.4 INTERTRIGO: ICD-10-CM

## 2023-08-21 RX ORDER — NYSTATIN 100000 [USP'U]/G
POWDER TOPICAL
Qty: 60 G | Refills: 3 | Status: SHIPPED | OUTPATIENT
Start: 2023-08-21

## 2023-08-24 ENCOUNTER — ANCILLARY PROCEDURE (OUTPATIENT)
Dept: MRI IMAGING | Facility: CLINIC | Age: 63
End: 2023-08-24
Attending: OTOLARYNGOLOGY
Payer: COMMERCIAL

## 2023-08-24 ENCOUNTER — OFFICE VISIT (OUTPATIENT)
Dept: OTOLARYNGOLOGY | Facility: CLINIC | Age: 63
End: 2023-08-24
Payer: COMMERCIAL

## 2023-08-24 VITALS
TEMPERATURE: 97.6 F | DIASTOLIC BLOOD PRESSURE: 83 MMHG | BODY MASS INDEX: 34.23 KG/M2 | SYSTOLIC BLOOD PRESSURE: 124 MMHG | HEIGHT: 66 IN | HEART RATE: 75 BPM | WEIGHT: 213 LBS | OXYGEN SATURATION: 96 %

## 2023-08-24 DIAGNOSIS — H92.11 OTORRHEA, RIGHT: Primary | ICD-10-CM

## 2023-08-24 DIAGNOSIS — H92.11 OTORRHEA, RIGHT: ICD-10-CM

## 2023-08-24 DIAGNOSIS — H90.6 MIXED CONDUCTIVE AND SENSORINEURAL HEARING LOSS OF BOTH EARS: ICD-10-CM

## 2023-08-24 LAB — BACTERIA SPEC CULT: NO GROWTH

## 2023-08-24 PROCEDURE — 70553 MRI BRAIN STEM W/O & W/DYE: CPT | Mod: GC | Performed by: RADIOLOGY

## 2023-08-24 PROCEDURE — A9585 GADOBUTROL INJECTION: HCPCS | Mod: JZ | Performed by: RADIOLOGY

## 2023-08-24 PROCEDURE — 92504 EAR MICROSCOPY EXAMINATION: CPT | Performed by: OTOLARYNGOLOGY

## 2023-08-24 PROCEDURE — 99213 OFFICE O/P EST LOW 20 MIN: CPT | Mod: 25 | Performed by: OTOLARYNGOLOGY

## 2023-08-24 RX ORDER — GADOBUTROL 604.72 MG/ML
10 INJECTION INTRAVENOUS ONCE
Status: COMPLETED | OUTPATIENT
Start: 2023-08-24 | End: 2023-08-24

## 2023-08-24 RX ADMIN — GADOBUTROL 9.5 ML: 604.72 INJECTION INTRAVENOUS at 07:48

## 2023-08-24 ASSESSMENT — PAIN SCALES - GENERAL: PAINLEVEL: NO PAIN (0)

## 2023-08-24 NOTE — PROGRESS NOTES
Wendy Dodd is seen for right otorrhea. She reports that she continues to get drainage every night. She says that if she lays on her left side, when she rolls to the right, she will get drainage out of her ear. However, if she lays on the right, she does not get enough drainage to get onto her pillow. She reports that she had quite a bit of drainage after her MRI this morning and she wiped it off. Her culture grew nothing from the last visit.    Physical examination:  female in no acute distress.  Alert and answering questions appropriately.  HB 1/6 bilaterally.  Both ears examined under the microscope. Right ear canal slightly moist but without actual otorrhea accumulation, TM with a large cartilage graft posteriorly with a stable epitympanic retraction pocket with no squamous debris, no obvious perforation.    Imaging: MRI and CT were independently reviewed. 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 Left tegmen mastoideum and tympani very thin, possibly dehiscence over the mastoid, also very sclerotic mastoid but aerated, thick TM, ossicular chain and otic capsule intact, facial nerve in its usual position.  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    Assessment and plan:  Symptoms still suggestive of a right CSF leak although still with no obvious perforation for the leak to come through the TM. It is possible there is a perforation anterior to the cartilage graft edge that I cannot visualize. I gave her a collection tube to collect her ear drainage and cautioned that she would need to bring it back the day after she collects it as the protein does degrade over time. She understood and will return the vial  once she collects some fluid.

## 2023-08-24 NOTE — LETTER
8/24/2023      RE: Wendy Dodd  [address] [facility] [address]       Wendy Dodd is seen for right otorrhea. She reports that she continues to get drainage every night. She says that if she lays on her left side, when she rolls to the right, she will get drainage out of her ear. However, if she lays on the right, she does not get enough drainage to get onto her pillow. She reports that she had quite a bit of drainage after her MRI this morning and she wiped it off. Her culture grew nothing from the last visit.    Physical examination:  female in no acute distress.  Alert and answering questions appropriately.  HB 1/6 bilaterally.  Both ears examined under the microscope. Right ear canal slightly moist but without actual otorrhea accumulation, TM with a large cartilage graft posteriorly with a stable epitympanic retraction pocket with no squamous debris, no obvious perforation.    Imaging: MRI and CT were independently reviewed. 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 Left tegmen mastoideum and tympani very thin, possibly dehiscence over the mastoid, also very sclerotic mastoid but aerated, thick TM, ossicular chain and otic capsule intact, facial nerve in its usual position.  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    Assessment and plan:  Symptoms still suggestive of a right CSF leak although still with no obvious perforation for the leak to come through the TM. It is possible there is a perforation anterior to the cartilage graft edge that I cannot visualize. I gave her a collection tube to collect her ear drainage and cautioned that she would need to bring it back the day after she  collects it as the protein does degrade over time. She understood and will return the vial once she collects some fluid.    Kim Ford MD

## 2023-08-24 NOTE — NURSING NOTE
Chief Complaint   Patient presents with    RECHECK     1 month follow up with MRI prior          Moy Field LPN

## 2023-08-24 NOTE — PATIENT INSTRUCTIONS
1. You were seen in the ENT Clinic today by Dr. Ford.  If you have any questions or concerns after your appointment, please call   - Option 1: ENT Clinic: [phone]   - Option 2: Molly (Dr. Ford's Nurse): [phone]          Yesy (Dr. Ford's Nurse): [phone]     2.   Plan to return to clinic as needed    3. Collect CSF fluid    How to Contact Us:  Send a Savaree message to your provider. Our team will respond to you via Savaree. Occasionally, we will need to call you to get further information.  For urgent matters (Monday-Friday), call the ENT Clinic: [phone] and speak with a call center team member - they will route your call appropriately.   If you'd like to speak directly with a nurse, please find our contact information below. We do our best to check voicemail frequently throughout the day, and will work to call you back within 1-2 days. For urgent matters, please use the general clinic phone numbers listed above.        Molly QUINTANA LPN  [facility] - Otolaryngology

## 2023-08-29 ENCOUNTER — LAB (OUTPATIENT)
Dept: LAB | Facility: OTHER | Age: 63
End: 2023-08-29
Payer: COMMERCIAL

## 2023-08-29 DIAGNOSIS — H92.11 OTORRHEA, RIGHT: ICD-10-CM

## 2023-08-29 PROCEDURE — 86334 IMMUNOFIX E-PHORESIS SERUM: CPT | Performed by: PATHOLOGY

## 2023-08-30 LAB
B2 TRANSFERRIN FLD QL: POSITIVE
B2 TRANSFERRIN INTERPRETATION: ABNORMAL

## 2023-09-07 ENCOUNTER — OFFICE VISIT (OUTPATIENT)
Dept: OTOLARYNGOLOGY | Facility: CLINIC | Age: 63
End: 2023-09-07
Payer: COMMERCIAL

## 2023-09-07 VITALS
SYSTOLIC BLOOD PRESSURE: 146 MMHG | HEIGHT: 66 IN | OXYGEN SATURATION: 97 % | HEART RATE: 89 BPM | TEMPERATURE: 97.4 F | DIASTOLIC BLOOD PRESSURE: 83 MMHG | WEIGHT: 212 LBS | BODY MASS INDEX: 34.07 KG/M2

## 2023-09-07 DIAGNOSIS — G96.01 CSF OTORRHEA: Primary | ICD-10-CM

## 2023-09-07 DIAGNOSIS — G96.01 CSF OTORRHEA: ICD-10-CM

## 2023-09-07 DIAGNOSIS — Q01.8 TEMPORAL ENCEPHALOCELE (H): Primary | ICD-10-CM

## 2023-09-07 PROCEDURE — 92504 EAR MICROSCOPY EXAMINATION: CPT | Performed by: OTOLARYNGOLOGY

## 2023-09-07 PROCEDURE — 99207 PR NO CHARGE LOS: CPT | Performed by: NEUROLOGICAL SURGERY

## 2023-09-07 PROCEDURE — 99214 OFFICE O/P EST MOD 30 MIN: CPT | Mod: 25 | Performed by: OTOLARYNGOLOGY

## 2023-09-07 RX ORDER — CLINDAMYCIN PHOSPHATE 900 MG/50ML
900 INJECTION, SOLUTION INTRAVENOUS SEE ADMIN INSTRUCTIONS
Status: CANCELLED | OUTPATIENT
Start: 2023-09-07

## 2023-09-07 RX ORDER — DEXAMETHASONE SODIUM PHOSPHATE 10 MG/ML
10 INJECTION, SOLUTION INTRAMUSCULAR; INTRAVENOUS ONCE
Status: CANCELLED | OUTPATIENT
Start: 2023-09-07 | End: 2023-09-07

## 2023-09-07 RX ORDER — ACETAMINOPHEN 325 MG/1
975 TABLET ORAL ONCE
Status: CANCELLED | OUTPATIENT
Start: 2023-09-07 | End: 2023-09-07

## 2023-09-07 RX ORDER — CLINDAMYCIN PHOSPHATE 900 MG/50ML
900 INJECTION, SOLUTION INTRAVENOUS
Status: CANCELLED | OUTPATIENT
Start: 2023-09-07

## 2023-09-07 ASSESSMENT — PAIN SCALES - GENERAL: PAINLEVEL: NO PAIN (0)

## 2023-09-07 NOTE — PATIENT INSTRUCTIONS
You were seen today with Dr. Kim Ford and Dr. Jennifer Gusman.     Next steps:  Our surgery coordinator will contact you with a surgery date.  Preop clearance with our anesthesia team - our surgery coordinators will set this up for you.      How to Contact Us  Send a reMailt message to your provider.   Call the clinic - your call will be routed appropriately.   ENT Clinic: [phone]   Neurosurgery Clinic: [phone]  To speak directly to an RN Care Coordinator:  Reshma, RN: [phone]  Yesy, RN: [phone]    Note: We do our best to check voicemail frequently throughout the day and make every effort to return calls within 1-2 business days. For urgent matters, please use the general clinic phone numbers listed above.       Surgery Instructions    PREPARING FOR SURGERY  You will need a preoperative assessment within 30 days of your surgery. We will set this up for you once we know your surgery date.  Before surgery, call the clinic:   If there is any change in your health, or you are having more frequent or severe symptoms   If you develop a cold or flu, or if you test positive for COVID-19   If you have any questions about what to expect before, during, or after surgery   If you need assistance filling out paperwork for short-term disability or FMLA, or you need a letter excusing you from work       MEDICATIONS BEFORE SURGERY  You will receive specific instructions about how to take your medications at your preoperative assessment visit. Talk to your care team about every medication that you take, including over-the-counter medications and supplements. Some medications can make you bleed too much during surgery, and some change how well anesthesia drugs work. Follow these general instructions for common medications, unless otherwise directed.     INSTRUCTIONS MEDICATION   Hold for 7 days before surgery  Supplements   Multivitamins   Aspirin (note: some medications can contain aspirin, like  Xiomara  Naproxen (Aleve)  Ibuprofen (Advil, Motrin)     Talk with the Preoperative Assessment Center (PAC) about how to take these medications before surgery    Insulin or oral medications for diabetes   Blood pressure medications   Anticoagulant medications, including:    warfarin (Coumadin)   enoxaparin (Lovenox)   dabigatran (Pradaxa)   apixaban (Eliquis)   rivaroxaban (Xarelto)   Antiplatelet medications, including:   clopidogrel bisulfate (Plavix)   cilostazol (Pletal)      Okay to keep taking for pain, as needed    Acetaminophen (Tylenol)        EATING AND DRINKING BEFORE SURGERY  Eat and drink as usual until 8 hours before your surgery arrival time. After that, no food or milk.   Drink clear liquids until 1 hour before your surgery arrival time. Clear liquids are liquids you can see through, like water, Gatorade, and Propel. You may also have black coffee and tea (no cream or milk).   Nothing by mouth within 1 hour of your surgery arrival time. This includes gum, candy, and breath mints.   If your care team tells you take medicine on the morning of surgery, it is okay to take medicine with a sip of water.   Do not drink alcohol for at least 24 hours before surgery. Do not use marijuana for 7 days prior to surgery.      PREVENTING INFECTION  Shower or bathe the night before and morning of your surgery following the instructions on your handout,  Showering Before Surgery.    Note: Only use the special antiseptic soap from your neck to your toes. Your care team will clean the skin at your surgery site.   Don t shave or clip hair near your surgery site. We ll remove the hair if needed.   Having diabetes and/or smoking can cause delayed wound healing and increase your risk of a surgical site infection. Quitting smoking and lowering your blood sugars can make a big difference. If you would like support with this, please let us know or work with your primary care provider.        SMOKING AND NICOTINE  Don t  "smoke or vape the morning of surgery. You may chew nicotine gum up to 2 hours before surgery. A nicotine patch is okay.   We strongly recommend quitting smoking and other tobacco products. Nicotine can cause delayed healing and wound complications after surgery.       PLANNING AHEAD - FINANCES  [email or web address]  [facility] Billing: Please call [phone], if you wish to pay a bill.  [facility] Financial Counselors: Please call [phone], if you have questions about possible costs and coverage or to discuss options if you don't have enough - or no - insurance for your care.  [facility] Cost of Care Estimates: Please visit the website to view our online estimate tool. If you have additional questions, call [phone] for estimates.  Our financial team will contact your insurance company as soon as surgery is scheduled. If your insurance company requires a prior authorization (pre-approval), our financial team will complete these necessary steps. Typically, we don t encounter many issues with insurance denying coverage for skull base surgery.    It is a good idea to call your insurance company and let them know you re having surgery. Ask questions about your deductible, co-insurance, and what of out-of-pocket costs you will be responsible for.   [employer] Clinical Services: You might hear about a company called [employer], with whom we contract to provide monitoring of your nerves during surgery (called \"intraoperative neuromonitoring\"). You may receive a letter from [employer] after your surgery, and this company and letter are legitimate. For questions and more information, please visit: [email or web address]      COVID-19 AND VISITOR RESTRICTIONS  Please visit [email or web address] for the latest information about hospital visitor " restrictions.      HEALTHCARE DIRECTIVE  Consider preparing a Health Care Directive and choosing a Health Care Agent. A Health Care Directive is a written plan outlining your values and priorities for your future medical treatment. A Health Care Agent makes health care decisions based on your wishes if you are unable to communicate.   Download a document, information materials or register for a free class on advance care planning and creating a health care directive by visiting [email or web address], calling [phone], or emailing [email or web address].   If you have a health care directive or choose to complete a healthcare directive before surgery, please upload the document to [employer]. This will be attached to your chart. You may also want to bring a copy with you on the day of surgery.       YOUR SURGERY DAY  Parking:   Both self-park and  parking (24 hours, 7 days a week) are available at the [facility] [place], [place]. Self-park and  rates are the same. If the Patient Visitors Ramp is full or if a patient or visitor has limited mobility, please follow the signs to the  located at the main entrance. For more information, please visit: [email or web address]    Due to safety concerns around COVID-19, [facility] cannot offer  services to all patients at this time. However, we do still offer  services for patients with mobility limitations.   What to bring:  Photo ID and insurance card   Copy of your healthcare directive (if you have one)   Glasses with case (you can t wear contacts during surgery)   Hearing aids with case   A few personal items (pack lightly)   Cell phone and    Inhaler (if you use)   Eye drops (if you use)   If you have a pacemaker, ICD (defibrillator) or other implant, please bring the ID card   If you have an implanted stimulator, please bring the remote control   If you have a legal guardian, bring a copy of the  certified (court-stamped) guardianship papers   What to leave at home:  Please remove any jewelry, including body piercings   Leave jewelry and other valuables at home       HOSPITAL STAY  On average, your hospital stay will be between 3-5 days. It could be shorter or longer depending on your specific procedure, your health, and your recovery. The first 24 hours of your hospital stay will typically be in the Intensive Care Unit (ICU). You will be monitored closely and may have some of the following: Intravenous (IV) fluids and medications, a catheter which drains your urine, or a lumbar drain (a small catheter in your low back).  After the ICU, you will be moved to a regular hospital bed/floor. The rest of your post-operative recovery will focus on your individual needs which may include: helping with balance, nausea, swallowing, bowel management, pain, and incision care.       HOME RECOVERY  Everyone's recovery is different based on their health condition, age, and overall health status.   Plan to have an adult stay with you for at least 24 hours after you get home from the hospital.   Arrange for help at home. It is common to feel tired for the first few weeks (maybe months) and you will need to avoid some activities. Many people find that having someone help with household tasks, such as cleaning, cooking, and running errands is helpful.    Make your home safe by removing tripping hazards and moving items you need to a place where you can easily reach them.        ACTIVITY RESTRICTIONS  Avoid strenuous exercise and activity for 6-12 weeks.   Do not lift anything greater than 10 pounds (about a gallon of milk).  Extra pressure in your head can disrupt the healing of the internal surgical area and cause complications. Avoid the following activities that may increase the pressure in your head:  Avoid bending over with your head below your heart  Avoid heavy lifting or straining  Avoid lifting with your arms above  your shoulders  Take care to prevent constipation, as this can lead to straining  Avoid drinking through straws  Avoid blowing your nose  Try to cough and sneeze with your mouth open      SLEEPING  Plan to sleep with your head elevated (at least 30 degrees) for at least 2 weeks after surgery. You may find that sleeping in a recliner is helpful and more comfortable. This is especially important if you have sleep apnea and are unable to use your CPAP after surgery.  Please let your care team know if you have sleep apnea. You will not be able to use your CPAP for at least 2 weeks after surgery, sometimes longer. We will monitor your breathing closely while you are in the hospital.      INCISION CARE - CRANIOTOMY (HEAD INCISION)  Usually, you will have a gauze bandage over your incision after surgery. Follow any instructions that are given to you at the time of discharge from the hospital.  Usually, you will need to avoid getting the incision wet until after the sutures are removed (at your 2-week follow up appointment).   If your surgeon tells you that you can shower before this, avoid soaking the incision or submerging it under water, and be very gentle (do not scrub the incision).  If you wear glasses and your incision is around your ear:  Avoid wearing glasses for at least 2 weeks after surgery. Your glasses can irritate the incision and can also affect the height of your ear (by weighing your ear down) if you've had a fat graft.  Check your incision every day. Call the clinic if you notice any of the following:  Increasing redness  Swelling or bogginess (fluid collection around / under the incision)  Drainage  Separation of wound edges  Fever      INCISION CARE - OTHER  Depending on your surgery, you might also have an incision on your upper leg / thigh or your abdomen. Sometimes, these incisions are covered with small strips of tape, called steri-strips. If you have steri-strips, allow these to peel off on their  own.  If you had a lumbar drain during surgery, you will have a small, dissolvable suture in your low back.       PAIN MANAGEMENT  It is normal to have some pain after surgery. Most people do not experience severe pain. If you are experiencing severe pain at the incision site or severe headaches, please let us know right away.  You will usually be sent home with a small amount of narcotic pain medication. You should gradually reduce the amount of pain medication that you take as your pain improves.  Avoid ibuprofen (Advil) or naproxen (Aleve) immediately after surgery, unless your surgeon tells you this is okay to take.   It is okay to take acetaminophen (Tylenol) for pain. You can take Tylenol with the narcotic pain medication, and some people find benefit in alternating between the narcotic pain medication and Tylenol. (Example: 1 tablet of oxycodone at 8:00 am, 1 tablet of Tylenol at 10:00 am).      DIET  A well-balanced diet is important for your recovery.   Try to eat plenty of fiber (fruits, whole grains, beans, and vegetables can be good sources of fiber) to help prevent constipation.   It is important to stay hydrated after surgery to prevent dehydration and help with bowel movements. Drink plenty of water throughout the day.      WORK AND FMLA / SHORT TERM DISABILITY  Most people take 6-12 weeks off work. This will depend on the type of work that you do and the surgery you are having.  We are happy to complete short term disability or FMLA paperwork for you. Please send a [employer] message (you can attach a document) with the paperwork that you need completed. Please let us know where you'd like for us to send the paperwork. We can send it directly to your leave  or employer, with your permission, or we can send it back to you.  Please allow at least 5-7 business days for paperwork completion and processing.       CALL THE CLINIC IF YOU EXPERIENCE:  Drainage, swelling, fluid collection, or  increased redness around the incision   Fever, or temperature of 101 degrees or higher   Stiff neck or extra sensitivity to light   Clear nasal drainage which you did not have before surgery, or a new salty/metallic taste  Increase in facial weakness   Vision changes  Pain not managed by your pain medication   Severe constipation or abdominal pain  Running low on prescription medication that was prescribed to you at the time of surgery   Any other symptoms that you have questions about    After clinic hours or on the weekends, please call the hospital  at [phone] and ask to speak with the Neurosurgery or ENT resident on call. If you have a serious emergency, call 911 or come to the emergency room. If you can, come to the hospital where you had your surgery.     During clinic hours:   Department  Phone    Ear, Nose, & Throat (ENT)    [phone]    Neurosurgery    [phone]     Skull Base RN Care Coordinators:  We do our best to check voicemail frequently throughout the day. For urgent matters, please use the general clinic phone numbers listed above. Your call will be routed appropriately.     Reshma Lewis MA, RN, PHN, [facility]   RN Care Coordinator & Skull Base    Direct: [phone]     Yesy Redd RN   ENT - Dr. Logan Chamberlain   Direct: [phone]

## 2023-09-07 NOTE — NURSING NOTE
Nurse Teaching - Skull Base Surgery  Relevant Diagnosis: CSF leak  Teaching Topic: Middle fossa approach, pre/wes/postop instructions  Person(s) involved in teaching: Patient and family member    Reviewed all instructions in AVS, including hospital COVID-19 restrictions, NPO prior to surgery, showering before surgery instructions (received antiseptic surgical soap), healthcare directive, postoperative cares, activity restrictions, pain management, and postop appointments. Patient has writer's direct contact information and was encouraged to call with any questions or concerns prior to surgery.

## 2023-09-07 NOTE — LETTER
9/7/2023       RE: Wendy Dodd  [address]   [place] [address]     Dear Colleague,    Thank you for referring your patient, Wendy Dodd, to the [facility] [place] [facility]. Please see a copy of my visit note below.    I saw Ms. 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 Please see Dr. Ford's notes for complete details. In summary, she is a 62 year old right handed woman with right sided CSF otorrhea. 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 We discussed the risks of surgery including death, brain injury, seizures, infection, failure to improve, hemorrhage, need for reoperation, and she agrees to proceed. We will aim for surgery in November.  IRMA Gusman MD    Again, thank you for allowing me to participate in the care of your patient.      Sincerely,    Jennifer Gusman MD

## 2023-09-07 NOTE — NURSING NOTE
"Chief Complaint   Patient presents with    Consult     Skull base      Blood pressure (!) 146/83, pulse 89, temperature 97.4  F (36.3  C), height 1.676 m (5' 6\"), weight 96.2 kg (212 lb), SpO2 97 %, not currently breastfeeding.    Moy Field LPN    "

## 2023-09-07 NOTE — LETTER
9/7/2023      RE: Wendy Dodd  [address] [facility] [address]       Wendy Dodd is seen in the [facility] lateral skullbase clinic for discussion regarding right CSF leak. I had seen her previously for right otorrhea primarily at night although without an obvious perforation and no otorrhea noted in clinic. I sent her home with a collection tube and she was able to collect fluid and bring it in which tested positive for beta-2-transferrin. She reports she continues to have right nighttime otorrhea which is clear. Stable hearing loss. No otalgia, no purulence, no vertigo.    CT and MRI were again reviewed which showed right tegmen tympani and mastoideum dehiscence with likely encephalocele formation, left tegmen is thin.    Physical examination:  female in no acute distress.  Alert and answering questions appropriately.  HB 1/6 bilaterally.  Right ear examined under the microscope. Ear canal is moist but without overt otorrhea, TM still appears intact although her graft obscures the anterior superior portion of the TM so it is possible there is a small perforation in that area.    Assessment and plan:  Right CSF leak with encephalocele. We discussed the diagnosis and the recommendation for a middle fossa encephalocele repair with temporalis muscle flap. Risks, benefits and postoperative course were discussed. Risks include but are not limited to: continued CSF leak requiring further treatment, seizures, bleeding, stroke and infection. There is a 6 week activity restriction postoperatively. She had her questions answered and, while she is not excited about needing a middle fossa surgery, she is pleased that she finally has an answer for her chronic right otorrhea. She would like to proceed.      Kim Ford MD

## 2023-09-08 ENCOUNTER — TELEPHONE (OUTPATIENT)
Dept: OTOLARYNGOLOGY | Facility: CLINIC | Age: 63
End: 2023-09-08
Payer: COMMERCIAL

## 2023-09-08 NOTE — TELEPHONE ENCOUNTER
Scheduled surgery with Dr. Ford/Dr. Gusman on 11/6    Spoke with: Patient    Surgery is located at [place] [facility]    Patient will be seen for their H&P by:    PCP Kristen Kehr PA-C within 30 days of surgery - Confirmed PCP on file is up to date - ok'd by Dr. Ford/Dr. Gusman for patient to not have her H&P with PAC    Anesthesia type: General      Requested Imaging required for surgery: NA    Patient is scheduled for their 10 day post op on 11/16 at 1030am    Confirmed that patient received her surgery packet in clinic    Additional comments: CELY Gates on 9/8/2023 at 9:14 AM

## 2023-09-20 ENCOUNTER — PRE VISIT (OUTPATIENT)
Dept: OTOLARYNGOLOGY | Facility: CLINIC | Age: 63
End: 2023-09-20

## 2023-10-04 ENCOUNTER — ALLIED HEALTH/NURSE VISIT (OUTPATIENT)
Dept: FAMILY MEDICINE | Facility: CLINIC | Age: 63
End: 2023-10-04
Payer: COMMERCIAL

## 2023-10-04 DIAGNOSIS — Z23 ENCOUNTER FOR IMMUNIZATION: Primary | ICD-10-CM

## 2023-10-04 PROCEDURE — 99207 PR NO CHARGE NURSE ONLY: CPT

## 2023-10-04 PROCEDURE — 90677 PCV20 VACCINE IM: CPT

## 2023-10-04 PROCEDURE — 90471 IMMUNIZATION ADMIN: CPT

## 2023-10-04 NOTE — PROGRESS NOTES
Prior to immunization administration, verified patients identity using patient s name and date of birth. Please see Immunization Activity for additional information.     Screening Questionnaire for Adult Immunization    Are you sick today?   No   Do you have allergies to medications, food, a vaccine component or latex?  YES    Have you ever had a serious reaction after receiving a vaccination?   No   Do you have a long-term health problem with heart, lung, kidney, or metabolic disease (e.g., diabetes), asthma, a blood disorder, no spleen, complement component deficiency, a cochlear implant, or a spinal fluid leak?  Are you on long-term aspirin therapy?   No   Do you have cancer, leukemia, HIV/AIDS, or any other immune system problem?   No   Do you have a parent, brother, or sister with an immune system problem?   No   In the past 3 months, have you taken medications that affect  your immune system, such as prednisone, other steroids, or anticancer drugs; drugs for the treatment of rheumatoid arthritis, Crohn s disease, or psoriasis; or have you had radiation treatments?   No   Have you had a seizure, or a brain or other nervous system problem?   No   During the past year, have you received a transfusion of blood or blood    products, or been given immune (gamma) globulin or antiviral drug?   No   For women: Are you pregnant or is there a chance you could become       pregnant during the next month?   No   Have you received any vaccinations in the past 4 weeks?   No     I have reviewed the following standing orders: This patient is due and qualifies for the Pneumococcal vaccine.    Click here for Pneumococcal (Adult) Standing Order      Immunization questionnaire answers were positive for at least one response. Patients primary care provider was notified and gave verbal Clearance to proceed with vaccination.     Patient was instructed to remain in clinic for 15 minutes afterwards, and to report any adverse reactions.      Screening and injections performed by Carlos RIVAS LPN on 9/15/2023 at 11:05 AM.      I

## 2023-10-09 NOTE — PROGRESS NOTES
Wendy Dodd is seen in the [facility] lateral skullbase clinic for discussion regarding right CSF leak. I had seen her previously for right otorrhea primarily at night although without an obvious perforation and no otorrhea noted in clinic. I sent her home with a collection tube and she was able to collect fluid and bring it in which tested positive for beta-2-transferrin. She reports she continues to have right nighttime otorrhea which is clear. Stable hearing loss. No otalgia, no purulence, no vertigo.    CT and MRI were again reviewed which showed right tegmen tympani and mastoideum dehiscence with likely encephalocele formation, left tegmen is thin.    Physical examination:  female in no acute distress.  Alert and answering questions appropriately.  HB 1/6 bilaterally.  Right ear examined under the microscope. Ear canal is moist but without overt otorrhea, TM still appears intact although her graft obscures the anterior superior portion of the TM so it is possible there is a small perforation in that area.    Assessment and plan:  Right CSF leak with encephalocele. We discussed the diagnosis and the recommendation for a middle fossa encephalocele repair with temporalis muscle flap. Risks, benefits and postoperative course were discussed. Risks include but are not limited to: continued CSF leak requiring further treatment, seizures, bleeding, stroke and infection. There is a 6 week activity restriction postoperatively. She had her questions answered and, while she is not excited about needing a middle fossa surgery, she is pleased that she finally has an answer for her chronic right otorrhea. She would like to proceed.

## 2023-10-31 ENCOUNTER — OFFICE VISIT (OUTPATIENT)
Dept: FAMILY MEDICINE | Facility: CLINIC | Age: 63
End: 2023-10-31
Payer: COMMERCIAL

## 2023-10-31 VITALS
OXYGEN SATURATION: 96 % | SYSTOLIC BLOOD PRESSURE: 135 MMHG | TEMPERATURE: 96.1 F | WEIGHT: 204 LBS | DIASTOLIC BLOOD PRESSURE: 84 MMHG | BODY MASS INDEX: 32.78 KG/M2 | HEIGHT: 66 IN | HEART RATE: 79 BPM | RESPIRATION RATE: 16 BRPM

## 2023-10-31 DIAGNOSIS — I10 BENIGN ESSENTIAL HYPERTENSION: ICD-10-CM

## 2023-10-31 DIAGNOSIS — E11.9 TYPE 2 DIABETES MELLITUS WITHOUT COMPLICATION, WITHOUT LONG-TERM CURRENT USE OF INSULIN (H): ICD-10-CM

## 2023-10-31 DIAGNOSIS — Z01.818 PREOP GENERAL PHYSICAL EXAM: Primary | ICD-10-CM

## 2023-10-31 DIAGNOSIS — G96.01 CSF OTORRHEA: ICD-10-CM

## 2023-10-31 DIAGNOSIS — E03.9 HYPOTHYROIDISM, UNSPECIFIED TYPE: ICD-10-CM

## 2023-10-31 LAB
HBA1C MFR BLD: 6.6 % (ref 0–5.6)
HGB BLD-MCNC: 15.7 G/DL (ref 11.7–15.7)
POTASSIUM SERPL-SCNC: 4.9 MMOL/L (ref 3.4–5.3)
TSH SERPL DL<=0.005 MIU/L-ACNC: 0.69 UIU/ML (ref 0.3–4.2)

## 2023-10-31 PROCEDURE — 36415 COLL VENOUS BLD VENIPUNCTURE: CPT | Performed by: PHYSICIAN ASSISTANT

## 2023-10-31 PROCEDURE — 84443 ASSAY THYROID STIM HORMONE: CPT | Performed by: PHYSICIAN ASSISTANT

## 2023-10-31 PROCEDURE — 93000 ELECTROCARDIOGRAM COMPLETE: CPT | Performed by: PHYSICIAN ASSISTANT

## 2023-10-31 PROCEDURE — 99214 OFFICE O/P EST MOD 30 MIN: CPT | Performed by: PHYSICIAN ASSISTANT

## 2023-10-31 PROCEDURE — 85018 HEMOGLOBIN: CPT | Performed by: PHYSICIAN ASSISTANT

## 2023-10-31 PROCEDURE — 84132 ASSAY OF SERUM POTASSIUM: CPT | Performed by: PHYSICIAN ASSISTANT

## 2023-10-31 PROCEDURE — 83036 HEMOGLOBIN GLYCOSYLATED A1C: CPT | Performed by: PHYSICIAN ASSISTANT

## 2023-10-31 ASSESSMENT — PAIN SCALES - GENERAL: PAINLEVEL: NO PAIN (0)

## 2023-10-31 NOTE — PATIENT INSTRUCTIONS
Preparing for Your Surgery  Getting started  A nurse will call you to review your health history and instructions. They will give you an arrival time based on your scheduled surgery time. Please be ready to share:  Your doctor's clinic name and phone number  Your medical, surgical, and anesthesia history  A list of allergies and sensitivities  A list of medicines, including herbal treatments and over-the-counter drugs  Whether the patient has a legal guardian (ask how to send us the papers in advance)  Please tell us if you're pregnant--or if there's any chance you might be pregnant. Some surgeries may injure a fetus (unborn baby), so they require a pregnancy test. Surgeries that are safe for a fetus don't always need a test, and you can choose whether to have one.   If you have a child who's having surgery, please ask for a copy of Preparing for Your Child's Surgery.    Preparing for surgery  Within 10 to 30 days of surgery: Have a pre-op exam (sometimes called an H&P, or History and Physical). This can be done at a clinic or pre-operative center.  If you're having a , you may not need this exam. Talk to your care team.  At your pre-op exam, talk to your care team about all medicines you take. If you need to stop any medicines before surgery, ask when to start taking them again.  We do this for your safety. Many medicines can make you bleed too much during surgery. Some change how well surgery (anesthesia) drugs work.  Call your insurance company to let them know you're having surgery. (If you don't have insurance, call [phone].)  Call your clinic if there's any change in your health. This includes signs of a cold or flu (sore throat, runny nose, cough, rash, fever). It also includes a scrape or scratch near the surgery site.  If you have questions on the day of surgery, call your hospital or surgery center.  Eating and drinking guidelines  For your safety: Unless your surgeon tells you  otherwise, follow the guidelines below.  Eat and drink as usual until 8 hours before you arrive for surgery. After that, no food or milk.  Drink clear liquids until 2 hours before you arrive. These are liquids you can see through, like water, Gatorade, and Propel Water. They also include plain black coffee and tea (no cream or milk), candy, and breath mints. You can spit out gum when you arrive.  If you drink alcohol: Stop drinking it the night before surgery.  If your care team tells you to take medicine on the morning of surgery, it's okay to take it with a sip of water.  Preventing infection  Shower or bathe the night before and morning of your surgery. Follow the instructions your clinic gave you. (If no instructions, use regular soap.)  Don't shave or clip hair near your surgery site. We'll remove the hair if needed.  Don't smoke or vape the morning of surgery. You may chew nicotine gum up to 2 hours before surgery. A nicotine patch is okay.  Note: Some surgeries require you to completely quit smoking and nicotine. Check with your surgeon.  Your care team will make every effort to keep you safe from infection. We will:  Clean our hands often with soap and water (or an alcohol-based hand rub).  Clean the skin at your surgery site with a special soap that kills germs.  Give you a special gown to keep you warm. (Cold raises the risk of infection.)  Wear special hair covers, masks, gowns and gloves during surgery.  Give antibiotic medicine, if prescribed. Not all surgeries need antibiotics.  What to bring on the day of surgery  Photo ID and insurance card  Copy of your health care directive, if you have one  Glasses and hearing aids (bring cases)  You can't wear contacts during surgery  Inhaler and eye drops, if you use them (tell us about these when you arrive)  CPAP machine or breathing device, if you use them  A few personal items, if spending the night  If you have . . .  A pacemaker, ICD (cardiac  defibrillator) or other implant: Bring the ID card.  An implanted stimulator: Bring the remote control.  A legal guardian: Bring a copy of the certified (court-stamped) guardianship papers.  Please remove any jewelry, including body piercings. Leave jewelry and other valuables at home.  If you're going home the day of surgery  You must have a responsible adult drive you home. They should stay with you overnight as well.  If you don't have someone to stay with you, and you aren't safe to go home alone, we may keep you overnight. Insurance often won't pay for this.  After surgery  If it's hard to control your pain or you need more pain medicine, please call your surgeon's office.  Questions?   If you have any questions for your care team, list them here: _________________________________________________________________________________________________________________________________________________________________________ ____________________________________ ____________________________________ ____________________________________  For informational purposes only. Not to replace the advice of your health care provider. Copyright   2003, 2019 [employer]. All rights reserved. Clinically reviewed by Salma Brown MD. [employer] 163759 - REV 12/22.    How to Take Your Medication Before Surgery  - HOLD (do not take) your METFORMIN on the morning of surgery.  - STOP taking all vitamins and herbal supplements 14 days before surgery.  - HOLD the blood pressure medications the day of surgery  - you can take your thyroid medication the morning of surgery with a sip of water

## 2023-10-31 NOTE — PROGRESS NOTES
[facility]  [address] [facility] [address]  Phone: [phone]  Primary Provider: Kehr, Kristen M  Pre-op Performing Provider: KEHR, KRISTEN M      PREOPERATIVE EVALUATION:  Today's date: 10/31/2023    Wendy is a 62 year old female who presents for a preoperative evaluation.      10/31/2023     6:59 AM   Additional Questions   Roomed by Demetrius SOLIS MA       Surgical Information:  Surgery/Procedure: Stealth assisted CRANIOTOMY, MIDDLE FOSSA APPROACH, FOR ENCEPHALOCELE REPAIR, WITH TEMPORALIS MUSCLE FLAP, Right  Surgery Location: [facility] OR  Surgeon: Dr. Gusman, Jennifer Castillo and Kim Ford  Surgery Date: 11/06/23  Time of Surgery: TBD  Where patient plans to recover: At home with family  Fax number for surgical facility: Note does not need to be faxed, will be available electronically in Epic.    FAX # [phone]    Assessment & Plan     The proposed surgical procedure is considered INTERMEDIATE risk.    Preop general physical exam  CSF otorrhea  - EKG 12-lead complete w/read - Clinics  - TSH with free T4 reflex; Future  - Hemoglobin A1c; Future  - Potassium; Future  - Hemoglobin; Future  - TSH with free T4 reflex  - Hemoglobin A1c  - Potassium  - Hemoglobin          Type 2 diabetes mellitus without complication, without long-term current use of insulin (H)  Well controlled  - TSH with free T4 reflex; Future  - Hemoglobin A1c; Future  - Potassium; Future  - Hemoglobin; Future  - TSH with free T4 reflex  - Hemoglobin A1c  - Potassium  - Hemoglobin    Hypothyroidism, unspecified type  Well controlled    Benign essential hypertension  Well controlled.       Possible Sleep Apnea: she will bring CPAP           - No identified additional risk factors other than previously addressed    Antiplatelet or Anticoagulation Medication Instructions:  Hold asprin / NSAIDS   HOLD (do not take) your METFORMIN on the morning of surgery.  - STOP taking all vitamins and herbal supplements 14 days  before surgery.  - HOLD the blood pressure medications the day of surgery  - you can take your thyroid medication the morning of surgery with a sip of water    Additional Medication Instructions:  See above. Take all medications per routine schedule except for above.     RECOMMENDATION:  APPROVAL GIVEN to proceed with proposed procedure, without further diagnostic evaluation.            Subjective       HPI related to upcoming procedure: Wendy is having CSF otorrhea and will be having a craniotomy with encephalocele repair. She is scheduled for the procedure 11/6/2023.          10/24/2023     8:46 AM   Preop Questions   1. Have you ever had a heart attack or stroke? No   2. Have you ever had surgery on your heart or blood vessels, such as a stent placement, a coronary artery bypass, or surgery on an artery in your head, neck, heart, or legs? No   3. Do you have chest pain with activity? No   4. Do you have a history of  heart failure? No   5. Do you currently have a cold, bronchitis or symptoms of other infection? No   6. Do you have a cough, shortness of breath, or wheezing? No   7. Do you or anyone in your family have previous history of blood clots? No   8. Do you or does anyone in your family have a serious bleeding problem such as prolonged bleeding following surgeries or cuts? No   9. Have you ever had problems with anemia or been told to take iron pills? YES - Iron   10. Have you had any abnormal blood loss such as black, tarry or bloody stools, or abnormal vaginal bleeding? No   11. Have you ever had a blood transfusion? No   12. Are you willing to have a blood transfusion if it is medically needed before, during, or after your surgery? Yes   13. Have you or any of your relatives ever had problems with anesthesia? No   14. Do you have sleep apnea, excessive snoring or daytime drowsiness? YES -    14a. Do you have a CPAP machine? Yes   15. Do you have any artifical heart valves or other implanted medical  devices like a pacemaker, defibrillator, or continuous glucose monitor? No   16. Do you have artificial joints? YES - right hip   17. Are you allergic to latex? No       Health Care Directive:  Patient does not have a Health Care Directive or Living Will: Discussed advance care planning with patient; however, patient declined at this time.    Preoperative Review of :   reviewed - no record of controlled substances prescribed.        Status of Chronic Conditions:  DIABETES - Patient has a longstanding history of DiabetesType Type II . Patient is being treated with oral agents and denies significant side effects. Control has been good. Complicating factors include but are not limited to: hypertension and hyperlipidemia.     HYPERLIPIDEMIA - Patient has a long history of significant Hyperlipidemia requiring medication for treatment with recent good control. Patient reports no problems or side effects with the medication.     HYPERTENSION - Patient has longstanding history of HTN , currently denies any symptoms referable to elevated blood pressure. Specifically denies chest pain, palpitations, dyspnea, orthopnea, PND or peripheral edema. Blood pressure readings have been in normal range. Current medication regimen is as listed below. Patient denies any side effects of medication.     HYPOTHYROIDISM - Patient has a longstanding history of chronic Hypothyroidism. Patient has been doing well, noting no tremor, insomnia, hair loss or changes in skin texture. Continues to take medications as directed, without adverse reactions or side effects. Last TSH   Lab Results   Component Value Date    TSH 2.98 07/18/2023   .      SLEEP PROBLEM - Patient has a longstanding history of Sleep Apnea. She wears CPAP  Review of Systems  CONSTITUTIONAL: NEGATIVE for fever, chills, change in weight  INTEGUMENTARY/SKIN: NEGATIVE for worrisome rashes, moles or lesions  EYES: NEGATIVE for vision changes or irritation  ENT/MOUTH: NEGATIVE  for ear, mouth and throat problems  RESP: NEGATIVE for significant cough or SOB  CV: NEGATIVE for chest pain, palpitations or peripheral edema  GI: NEGATIVE for nausea, abdominal pain, heartburn, or change in bowel habits  : NEGATIVE for frequency, dysuria, or hematuria  MUSCULOSKELETAL: NEGATIVE for significant arthralgias or myalgia  NEURO: NEGATIVE for weakness, dizziness or paresthesias  ENDOCRINE: NEGATIVE for temperature intolerance, skin/hair changes  HEME: NEGATIVE for bleeding problems  PSYCHIATRIC: NEGATIVE for changes in mood or affect    Patient Active Problem List    Diagnosis Date Noted    Morbid obesity (H) 11/09/2021     Priority: Medium    Esophageal reflux 09/24/2018     Priority: Medium    Type 2 diabetes mellitus without complication, without long-term current use of insulin (H) 04/23/2017     Priority: Medium    Benign essential hypertension 03/27/2017     Priority: Medium    Hypothyroidism, unspecified type 03/27/2017     Priority: Medium    Non morbid obesity due to excess calories 03/27/2017     Priority: Medium    History of colonic polyps 03/01/2017     Priority: Medium     Colonoscopy 2016      Diverticulosis of large intestine 12/12/2016     Priority: Medium    External hemorrhoids 12/12/2016     Priority: Medium    Advanced directives, counseling/discussion 04/21/2016     Priority: Medium     Information given to patient.     STEPHANIE Raymundo MA          Past Medical History:   Diagnosis Date    Cancer (H)     Cerebral infarction (H)     Diabetes (H)     Hypertension     Sleep apnea     Thyroid disease     Type 2 diabetes mellitus without complication, without long-term current use of insulin (H)      Past Surgical History:   Procedure Laterality Date    ABDOMEN SURGERY      APPENDECTOMY      BIOPSY      COLONOSCOPY      COLONOSCOPY N/A 2/22/2022    Procedure: COLONOSCOPY;  Surgeon: Devin Chawla MD;  Location:  GI    ENT SURGERY       Current Outpatient Medications   Medication Sig  Dispense Refill    alcohol swab prep pads Use to swab area of injection/shaka as directed. 100 each 3    aspirin 81 MG tablet Take 1 tablet (81 mg) by mouth daily 30 tablet 11    atorvastatin (LIPITOR) 10 MG tablet Take 1 tablet (10 mg) by mouth daily 90 tablet 3    blood glucose (NO BRAND SPECIFIED) test strip Use to test blood sugar 1 times daily or as directed. To accompany: Blood Glucose Monitor Brands: per insurance. 100 strip 6    blood glucose calibration (NO BRAND SPECIFIED) solution To accompany: Blood Glucose Monitor Brands: per insurance. 100 each 3    blood glucose monitoring (ACCU-CHEK RENY PLUS) test strip Use to test blood sugar 2 times daily or as directed. 200 strip 11    blood glucose monitoring (ACCU-CHEK FASTCLIX) lancets Use to test blood sugar 2 times daily or as directed. 102 each 11    blood glucose monitoring (NO BRAND SPECIFIED) meter device kit Use to test blood sugar 1 times daily or as directed. Preferred blood glucose meter OR supplies to accompany: Blood Glucose Monitor Brands: per insurance. 1 kit 0    Cyanocobalamin (B-12 PO)       diltiazem ER (TIAZAC) 360 MG 24 hr ER beaded capsule TAKE ONE CAPSULE BY MOUTH ONCE DAILY 90 capsule 3    folic acid 800 MCG TABS       IRON PO       levothyroxine (SYNTHROID/LEVOTHROID) 125 MCG tablet Take 1 tablet (125 mcg) by mouth daily 90 tablet 3    lisinopril (ZESTRIL) 20 MG tablet Take 1 tablet (20 mg) by mouth daily 90 tablet 3    metFORMIN (GLUCOPHAGE XR) 500 MG 24 hr tablet TAKE TWO TABLETS BY MOUTH TWICE A  tablet 3    nystatin (MYCOSTATIN) 991148 UNIT/GM external powder APPLY TO AFFECTED AREA(S) THREE TIMES A DAY AS NEEDED FOR RASH 60 g 3    prednisoLONE acetate (PRED FORTE) 1 % ophthalmic suspension Instill 5 drops in the right ear twice daily until follow up appointment 10 mL 1    thin (NO BRAND SPECIFIED) lancets Use with lanceting device. To accompany: Blood Glucose Monitor Brands: per insurance. 100 each 6       Allergies  "  Allergen Reactions    Penicillins     Sulfa Antibiotics Unknown        Social History     Tobacco Use    Smoking status: Former     Packs/day: 0.00     Years: 0.00     Additional pack years: 0.00     Total pack years: 0.00     Types: Cigarettes, Other     Start date: 1975     Quit date: 2015     Years since quittin.8     Passive exposure: Past    Smokeless tobacco: Never    Tobacco comments:     I use Ecig   Substance Use Topics    Alcohol use: Yes     Family History   Problem Relation Age of Onset    Colon Cancer Father     Other Cancer Father         liver    Other Cancer Mother     Other Cancer Brother     Skin Cancer Sister     Asthma No family hx of     Allergic rhinitis No family hx of      History   Drug Use No         Objective     /84   Pulse 79   Temp (!) 96.1  F (35.6  C) (Tympanic)   Resp 16   Ht 1.676 m (5' 6\")   Wt 92.5 kg (204 lb)   LMP  (LMP Unknown)   SpO2 96%   Breastfeeding No   BMI 32.93 kg/m      Physical Exam    GENERAL APPEARANCE: healthy, alert and no distress     EYES: EOMI, PERRL     HENT: nose and mouth without ulcers or lesions and oral mucous membranes moist     NECK: no adenopathy, no asymmetry, masses, or scars and thyroid normal to palpation     RESP: lungs clear to auscultation - no rales, rhonchi or wheezes     CV: regular rates and rhythm, normal S1 S2, no S3 or S4 and no murmur, click or rub     ABDOMEN:  soft, nontender, no HSM or masses and bowel sounds normal     MS: extremities normal- no gross deformities noted, no evidence of inflammation in joints, FROM in all extremities.     SKIN: no suspicious lesions or rashes     NEURO: Normal strength and tone, sensory exam grossly normal, mentation intact and speech normal     PSYCH: mentation appears normal. and affect normal/bright     LYMPHATICS: No cervical adenopathy    Recent Labs   Lab Test 23  0649 04/10/23  0656 04/10/23  0652 22  1527   NA  --   --  141 142   POTASSIUM  --   --  4.1 " 4.9   CR  --   --  0.71 0.84   A1C 7.2* 8.5*  --  8.1*        Diagnostics:  Recent Results (from the past 48 hour(s))   TSH with free T4 reflex    Collection Time: 10/31/23  7:46 AM   Result Value Ref Range    TSH 0.69 0.30 - 4.20 uIU/mL   Hemoglobin A1c    Collection Time: 10/31/23  7:46 AM   Result Value Ref Range    Hemoglobin A1C 6.6 (H) 0.0 - 5.6 %   Potassium    Collection Time: 10/31/23  7:46 AM   Result Value Ref Range    Potassium 4.9 3.4 - 5.3 mmol/L   Hemoglobin    Collection Time: 10/31/23  7:46 AM   Result Value Ref Range    Hemoglobin 15.7 11.7 - 15.7 g/dL      EKG: appears normal, NSR, normal axis, normal intervals, no acute ST/T changes c/w ischemia    Revised Cardiac Risk Index (RCRI):  The patient has the following serious cardiovascular risks for perioperative complications:   - High risk surgery (>5% cardiac complication risk) = 1 point     RCRI Interpretation: 1 point: Class II (low risk - 0.9% complication rate)         Signed Electronically by: Kristen M. Kehr, PA-C  Copy of this evaluation report is provided to requesting physician.

## 2023-11-02 ENCOUNTER — ANESTHESIA EVENT (OUTPATIENT)
Dept: SURGERY | Facility: CLINIC | Age: 63
DRG: 025 | End: 2023-11-02
Payer: COMMERCIAL

## 2023-11-03 ASSESSMENT — LIFESTYLE VARIABLES: TOBACCO_USE: 1

## 2023-11-03 NOTE — ANESTHESIA PREPROCEDURE EVALUATION
Anesthesia Pre-Procedure Evaluation    Patient: Wendy Dodd   MRN: [id number] : 1960        Procedure : Procedure(s):  Stealth assisted CRANIOTOMY, MIDDLE FOSSA APPROACH, FOR ENCEPHALOCELE REPAIR, WITH TEMPORALIS MUSCLE FLAP          Past Medical History:   Diagnosis Date    Cancer (H)     Cerebral infarction (H)     Diabetes (H)     Hypertension     Sleep apnea     Thyroid disease     Type 2 diabetes mellitus without complication, without long-term current use of insulin (H)       Past Surgical History:   Procedure Laterality Date    ABDOMEN SURGERY      APPENDECTOMY      BIOPSY      COLONOSCOPY      COLONOSCOPY N/A 2022    Procedure: COLONOSCOPY;  Surgeon: Devin Chawla MD;  Location:  GI    ENT SURGERY        Allergies   Allergen Reactions    Penicillins     Sulfa Antibiotics Unknown      Social History     Tobacco Use    Smoking status: Former     Packs/day: 0.00     Years: 0.00     Additional pack years: 0.00     Total pack years: 0.00     Types: Cigarettes, Other     Start date: 1975     Quit date: 2015     Years since quittin.8     Passive exposure: Past    Smokeless tobacco: Never    Tobacco comments:     I use Ecig   Substance Use Topics    Alcohol use: Yes      Wt Readings from Last 1 Encounters:   10/31/23 92.5 kg (204 lb)        Anesthesia Evaluation   Pt has had prior anesthetic. Type: MAC and General.    No history of anesthetic complications       ROS/MED HX  ENT/Pulmonary:     (+) sleep apnea,               tobacco use, Past use,                      Neurologic: Comment: Right CSF leak with encephalocele      Cardiovascular:     (+)  hypertension- -   -  - -                                 Previous cardiac testing   Echo: Date: Results:    Stress Test:  Date: Results:    ECG Reviewed:  Date: 2021 Results:  Sinus  Rhythm   -Nonspecific QRS widening and anterior fascicular block.   Cath:  Date: Results:      METS/Exercise Tolerance:     Hematologic:  - neg  hematologic  ROS     Musculoskeletal:  - neg musculoskeletal ROS     GI/Hepatic: Comment: Diverticulosis of large intestine    External hemorrhoids     H/O colon polyps     (+) GERD, Asymptomatic on medication,                  Renal/Genitourinary:       Endo:     (+)  type II DM, Last HgA1c: 6.6, date: 10/31/23, Not using insulin, - not using insulin pump.  not previously admitted for DM/DKA.  thyroid problem, hypothyroidism,    Obesity,       Psychiatric/Substance Use:  - neg psychiatric ROS     Infectious Disease:  - neg infectious disease ROS     Malignancy:  - neg malignancy ROS     Other:  - neg other ROS          Physical Exam    Airway        Mallampati: II   TM distance: > 3 FB   Neck ROM: full   Mouth opening: > 3 cm    Respiratory Devices and Support         Dental       (+) Completely normal teeth      Cardiovascular   cardiovascular exam normal          Pulmonary   pulmonary exam normal                OUTSIDE LABS:  CBC:   Lab Results   Component Value Date    WBC 6.4 04/07/2021    WBC 7.3 05/14/2010    HGB 15.7 10/31/2023    HGB 14.5 04/07/2021    HCT 44.4 04/07/2021    HCT 44.2 05/14/2010     04/07/2021     05/14/2010     BMP:   Lab Results   Component Value Date     04/10/2023     09/27/2022    POTASSIUM 4.9 10/31/2023    POTASSIUM 4.1 04/10/2023    CHLORIDE 102 04/10/2023    CHLORIDE 107 09/27/2022    CO2 29 04/10/2023    CO2 29 09/27/2022    BUN 15.9 04/10/2023    BUN 14 09/27/2022    CR 0.71 04/10/2023    CR 0.84 09/27/2022     (H) 04/10/2023     (H) 09/27/2022     COAGS:   Lab Results   Component Value Date    INR 0.86 05/14/2010     POC:   Lab Results   Component Value Date    HCG Negative 05/24/2004     HEPATIC:   Lab Results   Component Value Date    ALBUMIN 4.1 09/27/2022    PROTTOTAL 7.8 09/27/2022    ALT 70 (H) 09/27/2022    AST 39 09/27/2022    ALKPHOS 77 09/27/2022    BILITOTAL 0.5 09/27/2022     OTHER:   Lab Results   Component Value Date    A1C  6.6 (H) 10/31/2023    JAYCOB 10.2 04/10/2023    PHOS 2.8 09/04/2018    TSH 0.69 10/31/2023    T4 1.37 04/10/2023       Anesthesia Plan    ASA Status:  3    NPO Status:  NPO Appropriate    Anesthesia Type: General.     - Airway: ETT   Induction: Intravenous, Propofol.   Maintenance: Balanced.   Techniques and Equipment:     - AVOID: Avoid NG/OG     - Lines/Monitors: 2nd IV, BIS, Arterial Line     - Drips/Meds: Remifentanil, Phenylephrine     Consents    Anesthesia Plan(s) and associated risks, benefits, and realistic alternatives discussed. Questions answered and patient/representative(s) expressed understanding.     - Discussed:     - Discussed with:  Patient      - Extended Intubation/Ventilatory Support Discussed: No.      - Patient is DNR/DNI Status: No     Use of blood products discussed: Yes.     Postoperative Care    Pain management: IV analgesics, Oral pain medications, Multi-modal analgesia.   PONV prophylaxis: Ondansetron (or other 5HT-3), Dexamethasone or Solumedrol     Comments:                Garett Zuniga MD

## 2023-11-06 ENCOUNTER — ANESTHESIA (OUTPATIENT)
Dept: SURGERY | Facility: CLINIC | Age: 63
DRG: 025 | End: 2023-11-06
Payer: COMMERCIAL

## 2023-11-06 ENCOUNTER — HOSPITAL ENCOUNTER (INPATIENT)
Facility: CLINIC | Age: 63
LOS: 5 days | Discharge: HOME | DRG: 025 | End: 2023-11-11
Attending: NEUROLOGICAL SURGERY | Admitting: NEUROLOGICAL SURGERY
Payer: COMMERCIAL

## 2023-11-06 DIAGNOSIS — Z98.890 POSTOPERATIVE NAUSEA: ICD-10-CM

## 2023-11-06 DIAGNOSIS — Q01.9 ENCEPHALOCELE (H): Primary | ICD-10-CM

## 2023-11-06 DIAGNOSIS — I10 BENIGN ESSENTIAL HYPERTENSION: ICD-10-CM

## 2023-11-06 DIAGNOSIS — R11.0 POSTOPERATIVE NAUSEA: ICD-10-CM

## 2023-11-06 DIAGNOSIS — E11.9 TYPE 2 DIABETES MELLITUS WITHOUT COMPLICATION, WITHOUT LONG-TERM CURRENT USE OF INSULIN (H): ICD-10-CM

## 2023-11-06 LAB
ABO/RH(D): NORMAL
ANTIBODY SCREEN: NEGATIVE
GLUCOSE BLDC GLUCOMTR-MCNC: 131 MG/DL (ref 70–99)
GLUCOSE BLDC GLUCOMTR-MCNC: 173 MG/DL (ref 70–99)
GLUCOSE BLDC GLUCOMTR-MCNC: 209 MG/DL (ref 70–99)
SPECIMEN EXPIRATION DATE: NORMAL

## 2023-11-06 PROCEDURE — 86900 BLOOD TYPING SEROLOGIC ABO: CPT | Performed by: NEUROLOGICAL SURGERY

## 2023-11-06 PROCEDURE — 258N000003 HC RX IP 258 OP 636

## 2023-11-06 PROCEDURE — 00U207Z SUPPLEMENT DURA MATER WITH AUTOLOGOUS TISSUE SUBSTITUTE, OPEN APPROACH: ICD-10-PCS | Performed by: NEUROLOGICAL SURGERY

## 2023-11-06 PROCEDURE — 250N000009 HC RX 250: Performed by: NEUROLOGICAL SURGERY

## 2023-11-06 PROCEDURE — 999N000141 HC STATISTIC PRE-PROCEDURE NURSING ASSESSMENT: Performed by: NEUROLOGICAL SURGERY

## 2023-11-06 PROCEDURE — 250N000025 HC SEVOFLURANE, PER MIN: Performed by: NEUROLOGICAL SURGERY

## 2023-11-06 PROCEDURE — 0KB00ZZ EXCISION OF HEAD MUSCLE, OPEN APPROACH: ICD-10-PCS | Performed by: NEUROLOGICAL SURGERY

## 2023-11-06 PROCEDURE — 250N000009 HC RX 250

## 2023-11-06 PROCEDURE — 250N000011 HC RX IP 250 OP 636: Mod: JZ | Performed by: OTOLARYNGOLOGY

## 2023-11-06 PROCEDURE — 250N000011 HC RX IP 250 OP 636

## 2023-11-06 PROCEDURE — 62121 INCISE SKULL REPAIR: CPT | Mod: 62 | Performed by: OTOLARYNGOLOGY

## 2023-11-06 PROCEDURE — 250N000011 HC RX IP 250 OP 636: Mod: JZ

## 2023-11-06 PROCEDURE — 200N000002 HC R&B ICU UMMC

## 2023-11-06 PROCEDURE — 272N000001 HC OR GENERAL SUPPLY STERILE: Performed by: NEUROLOGICAL SURGERY

## 2023-11-06 PROCEDURE — C1713 ANCHOR/SCREW BN/BN,TIS/BN: HCPCS | Performed by: NEUROLOGICAL SURGERY

## 2023-11-06 PROCEDURE — 370N000017 HC ANESTHESIA TECHNICAL FEE, PER MIN: Performed by: NEUROLOGICAL SURGERY

## 2023-11-06 PROCEDURE — 36415 COLL VENOUS BLD VENIPUNCTURE: CPT | Performed by: NEUROLOGICAL SURGERY

## 2023-11-06 PROCEDURE — 250N000013 HC RX MED GY IP 250 OP 250 PS 637: Performed by: OTOLARYNGOLOGY

## 2023-11-06 PROCEDURE — 250N000013 HC RX MED GY IP 250 OP 250 PS 637

## 2023-11-06 PROCEDURE — 272N000002 HC OR SUPPLY OTHER OPNP: Performed by: NEUROLOGICAL SURGERY

## 2023-11-06 PROCEDURE — P9045 ALBUMIN (HUMAN), 5%, 250 ML: HCPCS | Mod: JZ

## 2023-11-06 PROCEDURE — 710N000009 HC RECOVERY PHASE 1, LEVEL 1, PER MIN: Performed by: NEUROLOGICAL SURGERY

## 2023-11-06 PROCEDURE — 360N000079 HC SURGERY LEVEL 6, PER MIN: Performed by: NEUROLOGICAL SURGERY

## 2023-11-06 PROCEDURE — 0NB50ZZ EXCISION OF RIGHT TEMPORAL BONE, OPEN APPROACH: ICD-10-PCS | Performed by: NEUROLOGICAL SURGERY

## 2023-11-06 PROCEDURE — 4A10X2Z MONITORING OF CENTRAL NERVOUS CONDUCTIVITY, EXTERNAL APPROACH: ICD-10-PCS | Performed by: NEUROLOGICAL SURGERY

## 2023-11-06 PROCEDURE — 8E09XBZ COMPUTER ASSISTED PROCEDURE OF HEAD AND NECK REGION: ICD-10-PCS | Performed by: NEUROLOGICAL SURGERY

## 2023-11-06 DEVICE — IMP PLATE SYN BURR HOLE COVER 17MM 04.503.023: Type: IMPLANTABLE DEVICE | Site: CRANIAL | Status: FUNCTIONAL

## 2023-11-06 DEVICE — IMP SCR SYN MATRIX LOW PRO 1.5X04MM SELF DRILL 04.503.104.01: Type: IMPLANTABLE DEVICE | Site: CRANIAL | Status: FUNCTIONAL

## 2023-11-06 DEVICE — IMP SCR SYN MATRIX LOW PRO 1.5X03MM SELF DRILL 04.503.103.01: Type: IMPLANTABLE DEVICE | Site: CRANIAL | Status: FUNCTIONAL

## 2023-11-06 DEVICE — IMP PLATE SYN MATRIXNEURO STR 2 HOLE 12MM  04.503.062: Type: IMPLANTABLE DEVICE | Site: CRANIAL | Status: FUNCTIONAL

## 2023-11-06 RX ORDER — LIDOCAINE 40 MG/G
CREAM TOPICAL
Status: DISCONTINUED | OUTPATIENT
Start: 2023-11-06 | End: 2023-11-06 | Stop reason: HOSPADM

## 2023-11-06 RX ORDER — ONDANSETRON 2 MG/ML
4 INJECTION INTRAMUSCULAR; INTRAVENOUS EVERY 30 MIN PRN
Status: DISCONTINUED | OUTPATIENT
Start: 2023-11-06 | End: 2023-11-06 | Stop reason: HOSPADM

## 2023-11-06 RX ORDER — NICOTINE POLACRILEX 4 MG
15-30 LOZENGE BUCCAL
Status: DISCONTINUED | OUTPATIENT
Start: 2023-11-06 | End: 2023-11-11 | Stop reason: HOSPADM

## 2023-11-06 RX ORDER — OXYCODONE HYDROCHLORIDE 5 MG/1
5 TABLET ORAL
Status: DISCONTINUED | OUTPATIENT
Start: 2023-11-06 | End: 2023-11-06 | Stop reason: HOSPADM

## 2023-11-06 RX ORDER — HYDROMORPHONE HCL IN WATER/PF 6 MG/30 ML
0.4 PATIENT CONTROLLED ANALGESIA SYRINGE INTRAVENOUS EVERY 5 MIN PRN
Status: DISCONTINUED | OUTPATIENT
Start: 2023-11-06 | End: 2023-11-06 | Stop reason: HOSPADM

## 2023-11-06 RX ORDER — OXYCODONE HYDROCHLORIDE 10 MG/1
10 TABLET ORAL
Status: DISCONTINUED | OUTPATIENT
Start: 2023-11-06 | End: 2023-11-06 | Stop reason: HOSPADM

## 2023-11-06 RX ORDER — ONDANSETRON 2 MG/ML
4 INJECTION INTRAMUSCULAR; INTRAVENOUS EVERY 6 HOURS PRN
Status: DISCONTINUED | OUTPATIENT
Start: 2023-11-06 | End: 2023-11-08

## 2023-11-06 RX ORDER — LIDOCAINE HYDROCHLORIDE 20 MG/ML
INJECTION, SOLUTION INFILTRATION; PERINEURAL PRN
Status: DISCONTINUED | OUTPATIENT
Start: 2023-11-06 | End: 2023-11-06

## 2023-11-06 RX ORDER — ONDANSETRON 4 MG/1
4 TABLET, ORALLY DISINTEGRATING ORAL EVERY 6 HOURS PRN
Status: DISCONTINUED | OUTPATIENT
Start: 2023-11-06 | End: 2023-11-08

## 2023-11-06 RX ORDER — DILTIAZEM HYDROCHLORIDE 360 MG/1
360 CAPSULE, EXTENDED RELEASE ORAL DAILY
Status: DISCONTINUED | OUTPATIENT
Start: 2023-11-06 | End: 2023-11-11 | Stop reason: HOSPADM

## 2023-11-06 RX ORDER — BUPIVACAINE HYDROCHLORIDE AND EPINEPHRINE 2.5; 5 MG/ML; UG/ML
INJECTION, SOLUTION INFILTRATION; PERINEURAL PRN
Status: DISCONTINUED | OUTPATIENT
Start: 2023-11-06 | End: 2023-11-06 | Stop reason: HOSPADM

## 2023-11-06 RX ORDER — LEVETIRACETAM 500 MG/1
500 TABLET ORAL 2 TIMES DAILY
Status: DISCONTINUED | OUTPATIENT
Start: 2023-11-06 | End: 2023-11-11 | Stop reason: HOSPADM

## 2023-11-06 RX ORDER — CLINDAMYCIN PHOSPHATE 900 MG/50ML
900 INJECTION, SOLUTION INTRAVENOUS
Status: COMPLETED | OUTPATIENT
Start: 2023-11-06 | End: 2023-11-06

## 2023-11-06 RX ORDER — FENTANYL CITRATE 50 UG/ML
INJECTION, SOLUTION INTRAMUSCULAR; INTRAVENOUS PRN
Status: DISCONTINUED | OUTPATIENT
Start: 2023-11-06 | End: 2023-11-06

## 2023-11-06 RX ORDER — ONDANSETRON 2 MG/ML
INJECTION INTRAMUSCULAR; INTRAVENOUS PRN
Status: DISCONTINUED | OUTPATIENT
Start: 2023-11-06 | End: 2023-11-06

## 2023-11-06 RX ORDER — HYDRALAZINE HYDROCHLORIDE 20 MG/ML
10-20 INJECTION INTRAMUSCULAR; INTRAVENOUS EVERY 30 MIN PRN
Status: DISCONTINUED | OUTPATIENT
Start: 2023-11-06 | End: 2023-11-10

## 2023-11-06 RX ORDER — ALBUMIN (HUMAN) 12.5 G/50ML
SOLUTION INTRAVENOUS CONTINUOUS PRN
Status: DISCONTINUED | OUTPATIENT
Start: 2023-11-06 | End: 2023-11-06

## 2023-11-06 RX ORDER — ONDANSETRON 4 MG/1
4 TABLET, ORALLY DISINTEGRATING ORAL EVERY 30 MIN PRN
Status: DISCONTINUED | OUTPATIENT
Start: 2023-11-06 | End: 2023-11-06 | Stop reason: HOSPADM

## 2023-11-06 RX ORDER — DEXAMETHASONE SODIUM PHOSPHATE 4 MG/ML
INJECTION, SOLUTION INTRA-ARTICULAR; INTRALESIONAL; INTRAMUSCULAR; INTRAVENOUS; SOFT TISSUE PRN
Status: DISCONTINUED | OUTPATIENT
Start: 2023-11-06 | End: 2023-11-06

## 2023-11-06 RX ORDER — METFORMIN HCL 500 MG
500 TABLET, EXTENDED RELEASE 24 HR ORAL 2 TIMES DAILY WITH MEALS
Status: DISCONTINUED | OUTPATIENT
Start: 2023-11-06 | End: 2023-11-11 | Stop reason: HOSPADM

## 2023-11-06 RX ORDER — ACETAMINOPHEN 325 MG/1
975 TABLET ORAL ONCE
Status: COMPLETED | OUTPATIENT
Start: 2023-11-06 | End: 2023-11-06

## 2023-11-06 RX ORDER — FENTANYL CITRATE 50 UG/ML
25 INJECTION, SOLUTION INTRAMUSCULAR; INTRAVENOUS EVERY 5 MIN PRN
Status: DISCONTINUED | OUTPATIENT
Start: 2023-11-06 | End: 2023-11-06 | Stop reason: HOSPADM

## 2023-11-06 RX ORDER — PHENYLEPHRINE HCL IN 0.9% NACL 50MG/250ML
.1-6 PLASTIC BAG, INJECTION (ML) INTRAVENOUS CONTINUOUS
Status: DISCONTINUED | OUTPATIENT
Start: 2023-11-06 | End: 2023-11-06 | Stop reason: HOSPADM

## 2023-11-06 RX ORDER — POLYETHYLENE GLYCOL 3350 17 G/17G
17 POWDER, FOR SOLUTION ORAL DAILY
Status: DISCONTINUED | OUTPATIENT
Start: 2023-11-07 | End: 2023-11-11 | Stop reason: HOSPADM

## 2023-11-06 RX ORDER — FENTANYL CITRATE 50 UG/ML
50 INJECTION, SOLUTION INTRAMUSCULAR; INTRAVENOUS EVERY 5 MIN PRN
Status: DISCONTINUED | OUTPATIENT
Start: 2023-11-06 | End: 2023-11-06 | Stop reason: HOSPADM

## 2023-11-06 RX ORDER — LABETALOL HYDROCHLORIDE 5 MG/ML
10-40 INJECTION, SOLUTION INTRAVENOUS EVERY 10 MIN PRN
Status: DISCONTINUED | OUTPATIENT
Start: 2023-11-06 | End: 2023-11-10

## 2023-11-06 RX ORDER — SODIUM CHLORIDE, SODIUM LACTATE, POTASSIUM CHLORIDE, CALCIUM CHLORIDE 600; 310; 30; 20 MG/100ML; MG/100ML; MG/100ML; MG/100ML
INJECTION, SOLUTION INTRAVENOUS CONTINUOUS
Status: DISCONTINUED | OUTPATIENT
Start: 2023-11-06 | End: 2023-11-06 | Stop reason: HOSPADM

## 2023-11-06 RX ORDER — LISINOPRIL 20 MG/1
20 TABLET ORAL DAILY
Status: DISCONTINUED | OUTPATIENT
Start: 2023-11-06 | End: 2023-11-11 | Stop reason: HOSPADM

## 2023-11-06 RX ORDER — LIDOCAINE 40 MG/G
CREAM TOPICAL
Status: DISCONTINUED | OUTPATIENT
Start: 2023-11-06 | End: 2023-11-11 | Stop reason: HOSPADM

## 2023-11-06 RX ORDER — HYDROMORPHONE HCL IN WATER/PF 6 MG/30 ML
0.2 PATIENT CONTROLLED ANALGESIA SYRINGE INTRAVENOUS EVERY 5 MIN PRN
Status: DISCONTINUED | OUTPATIENT
Start: 2023-11-06 | End: 2023-11-06 | Stop reason: HOSPADM

## 2023-11-06 RX ORDER — LABETALOL 20 MG/4 ML (5 MG/ML) INTRAVENOUS SYRINGE
PRN
Status: DISCONTINUED | OUTPATIENT
Start: 2023-11-06 | End: 2023-11-06

## 2023-11-06 RX ORDER — SODIUM CHLORIDE, SODIUM LACTATE, POTASSIUM CHLORIDE, CALCIUM CHLORIDE 600; 310; 30; 20 MG/100ML; MG/100ML; MG/100ML; MG/100ML
INJECTION, SOLUTION INTRAVENOUS CONTINUOUS PRN
Status: DISCONTINUED | OUTPATIENT
Start: 2023-11-06 | End: 2023-11-06

## 2023-11-06 RX ORDER — ATORVASTATIN CALCIUM 10 MG/1
10 TABLET, FILM COATED ORAL DAILY
Status: DISCONTINUED | OUTPATIENT
Start: 2023-11-07 | End: 2023-11-11 | Stop reason: HOSPADM

## 2023-11-06 RX ORDER — DEXAMETHASONE SODIUM PHOSPHATE 10 MG/ML
10 INJECTION, SOLUTION INTRAMUSCULAR; INTRAVENOUS ONCE
Status: DISCONTINUED | OUTPATIENT
Start: 2023-11-06 | End: 2023-11-06 | Stop reason: HOSPADM

## 2023-11-06 RX ORDER — BISACODYL 10 MG
10 SUPPOSITORY, RECTAL RECTAL DAILY PRN
Status: DISCONTINUED | OUTPATIENT
Start: 2023-11-06 | End: 2023-11-11 | Stop reason: HOSPADM

## 2023-11-06 RX ORDER — ACETAMINOPHEN 325 MG/1
975 TABLET ORAL EVERY 8 HOURS
Status: COMPLETED | OUTPATIENT
Start: 2023-11-06 | End: 2023-11-09

## 2023-11-06 RX ORDER — ACETAMINOPHEN 325 MG/1
650 TABLET ORAL EVERY 4 HOURS PRN
Status: DISCONTINUED | OUTPATIENT
Start: 2023-11-09 | End: 2023-11-11 | Stop reason: HOSPADM

## 2023-11-06 RX ORDER — SODIUM CHLORIDE 9 MG/ML
INJECTION, SOLUTION INTRAVENOUS CONTINUOUS
Status: DISCONTINUED | OUTPATIENT
Start: 2023-11-06 | End: 2023-11-07

## 2023-11-06 RX ORDER — DEXTROSE MONOHYDRATE 25 G/50ML
25-50 INJECTION, SOLUTION INTRAVENOUS
Status: DISCONTINUED | OUTPATIENT
Start: 2023-11-06 | End: 2023-11-11 | Stop reason: HOSPADM

## 2023-11-06 RX ORDER — AMOXICILLIN 250 MG
1 CAPSULE ORAL 2 TIMES DAILY
Status: DISCONTINUED | OUTPATIENT
Start: 2023-11-06 | End: 2023-11-11 | Stop reason: HOSPADM

## 2023-11-06 RX ORDER — LEVOTHYROXINE SODIUM 125 UG/1
125 TABLET ORAL DAILY
Status: DISCONTINUED | OUTPATIENT
Start: 2023-11-07 | End: 2023-11-11 | Stop reason: HOSPADM

## 2023-11-06 RX ORDER — CLINDAMYCIN PHOSPHATE 900 MG/50ML
900 INJECTION, SOLUTION INTRAVENOUS SEE ADMIN INSTRUCTIONS
Status: DISCONTINUED | OUTPATIENT
Start: 2023-11-06 | End: 2023-11-06 | Stop reason: HOSPADM

## 2023-11-06 RX ORDER — PROMETHAZINE HYDROCHLORIDE 25 MG/ML
12.5 INJECTION INTRAMUSCULAR; INTRAVENOUS EVERY 6 HOURS
Status: COMPLETED | OUTPATIENT
Start: 2023-11-06 | End: 2023-11-08

## 2023-11-06 RX ORDER — MANNITOL 20 G/100ML
INJECTION, SOLUTION INTRAVENOUS PRN
Status: DISCONTINUED | OUTPATIENT
Start: 2023-11-06 | End: 2023-11-06

## 2023-11-06 RX ORDER — PROPOFOL 10 MG/ML
INJECTION, EMULSION INTRAVENOUS PRN
Status: DISCONTINUED | OUTPATIENT
Start: 2023-11-06 | End: 2023-11-06

## 2023-11-06 RX ORDER — PHENYTOIN SODIUM 100 MG/1
100 CAPSULE, EXTENDED RELEASE ORAL 3 TIMES DAILY
Status: CANCELLED | OUTPATIENT
Start: 2023-11-06 | End: 2023-11-13

## 2023-11-06 RX ORDER — CYCLOBENZAPRINE HCL 10 MG
10 TABLET ORAL EVERY 8 HOURS PRN
Status: DISCONTINUED | OUTPATIENT
Start: 2023-11-06 | End: 2023-11-11 | Stop reason: HOSPADM

## 2023-11-06 RX ADMIN — LABETALOL HYDROCHLORIDE 20 MG: 5 INJECTION, SOLUTION INTRAVENOUS at 13:49

## 2023-11-06 RX ADMIN — LABETALOL HYDROCHLORIDE 10 MG: 5 INJECTION, SOLUTION INTRAVENOUS at 13:32

## 2023-11-06 RX ADMIN — FENTANYL CITRATE 50 MCG: 50 INJECTION INTRAMUSCULAR; INTRAVENOUS at 12:33

## 2023-11-06 RX ADMIN — FENTANYL CITRATE 50 MCG: 50 INJECTION INTRAMUSCULAR; INTRAVENOUS at 09:14

## 2023-11-06 RX ADMIN — FENTANYL CITRATE 50 MCG: 50 INJECTION INTRAMUSCULAR; INTRAVENOUS at 07:43

## 2023-11-06 RX ADMIN — Medication 50 MG: at 07:43

## 2023-11-06 RX ADMIN — MANNITOL 90 G: 20 INJECTION, SOLUTION INTRAVENOUS at 08:31

## 2023-11-06 RX ADMIN — DEXAMETHASONE SODIUM PHOSPHATE 10 MG: 4 INJECTION, SOLUTION INTRA-ARTICULAR; INTRALESIONAL; INTRAMUSCULAR; INTRAVENOUS; SOFT TISSUE at 07:43

## 2023-11-06 RX ADMIN — LEVETIRACETAM 2 G: 100 INJECTION, SOLUTION INTRAVENOUS at 12:03

## 2023-11-06 RX ADMIN — HYDROMORPHONE HYDROCHLORIDE 0.5 MG: 1 INJECTION, SOLUTION INTRAMUSCULAR; INTRAVENOUS; SUBCUTANEOUS at 11:27

## 2023-11-06 RX ADMIN — CLINDAMYCIN PHOSPHATE 900 MG: 900 INJECTION, SOLUTION INTRAVENOUS at 07:51

## 2023-11-06 RX ADMIN — ALBUMIN (HUMAN): 12.5 SOLUTION INTRAVENOUS at 10:34

## 2023-11-06 RX ADMIN — ONDANSETRON 4 MG: 2 INJECTION INTRAMUSCULAR; INTRAVENOUS at 12:09

## 2023-11-06 RX ADMIN — ACETAMINOPHEN 975 MG: 325 TABLET, FILM COATED ORAL at 16:46

## 2023-11-06 RX ADMIN — PROPOFOL 40 MG: 10 INJECTION, EMULSION INTRAVENOUS at 08:44

## 2023-11-06 RX ADMIN — REMIFENTANIL HYDROCHLORIDE 0.05 MCG/KG/MIN: 1 INJECTION, POWDER, LYOPHILIZED, FOR SOLUTION INTRAVENOUS at 08:20

## 2023-11-06 RX ADMIN — ACETAMINOPHEN 975 MG: 325 TABLET, FILM COATED ORAL at 07:08

## 2023-11-06 RX ADMIN — LABETALOL 20 MG/4 ML (5 MG/ML) INTRAVENOUS SYRINGE 20 MG: at 12:56

## 2023-11-06 RX ADMIN — ACETAMINOPHEN 975 MG: 325 TABLET, FILM COATED ORAL at 23:54

## 2023-11-06 RX ADMIN — SODIUM CHLORIDE: 9 INJECTION, SOLUTION INTRAVENOUS at 16:37

## 2023-11-06 RX ADMIN — LIDOCAINE HYDROCHLORIDE 90 MG: 20 INJECTION, SOLUTION INFILTRATION; PERINEURAL at 07:43

## 2023-11-06 RX ADMIN — SODIUM CHLORIDE, POTASSIUM CHLORIDE, SODIUM LACTATE AND CALCIUM CHLORIDE: 600; 310; 30; 20 INJECTION, SOLUTION INTRAVENOUS at 08:20

## 2023-11-06 RX ADMIN — SUGAMMADEX 200 MG: 100 INJECTION, SOLUTION INTRAVENOUS at 12:43

## 2023-11-06 RX ADMIN — SODIUM CHLORIDE, SODIUM LACTATE, POTASSIUM CHLORIDE, CALCIUM CHLORIDE: 600; 310; 30; 20 INJECTION, SOLUTION INTRAVENOUS at 08:02

## 2023-11-06 RX ADMIN — PROPOFOL 150 MG: 10 INJECTION, EMULSION INTRAVENOUS at 07:43

## 2023-11-06 RX ADMIN — SENNOSIDES AND DOCUSATE SODIUM 1 TABLET: 8.6; 5 TABLET ORAL at 20:33

## 2023-11-06 RX ADMIN — FENTANYL CITRATE 50 MCG: 50 INJECTION INTRAMUSCULAR; INTRAVENOUS at 09:41

## 2023-11-06 RX ADMIN — LEVETIRACETAM 500 MG: 500 TABLET, FILM COATED ORAL at 20:33

## 2023-11-06 ASSESSMENT — ACTIVITIES OF DAILY LIVING (ADL)
ADLS_ACUITY_SCORE: 35
ADLS_ACUITY_SCORE: 22
ADLS_ACUITY_SCORE: 22
ADLS_ACUITY_SCORE: 37
ADLS_ACUITY_SCORE: 35
ADLS_ACUITY_SCORE: 22

## 2023-11-06 ASSESSMENT — VISUAL ACUITY
OU: GLASSES;BASELINE
OU: GLASSES
OU: GLASSES;BASELINE
OU: GLASSES;BASELINE

## 2023-11-06 NOTE — PROGRESS NOTES
This 62 year old right handed woman presents for a right middle fossa approach for repair of encephalocele and CSF otorrhea. She understands the risks of death, brain injury, seizures, infection, failure to improve, hemorrhage, need for reoperation, and she agrees to proceed.  IRMA Gusman MD

## 2023-11-06 NOTE — BRIEF OP NOTE
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]  Post-operative diagnosis Same as pre-operative diagnosis    Procedure: Stealth assisted CRANIOTOMY, MIDDLE FOSSA APPROACH, FOR ENCEPHALOCELE REPAIR, WITH TEMPORALIS MUSCLE FLAP, N/A - Head  Insert drain lumbar, N/A - Spine    Surgeon: Surgeon(s) and Role:     * Jennifer Gusman MD - Primary     * Romaine Storey MD - Resident - Assisting     * Pj Rios MD - Resident - Assisting     * Liz Gonzalez MD - Fellow - Assisting     * Kim Ford MD  Anesthesia: General   Estimated Blood Loss: 100mL    Drains:  Lumbar drain  Specimens: * No specimens in log *  Findings:  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  Implants:   Implant Name Type Inv. Item Serial No.  Lot No. LRB No. Used Action   IMP PLATE SYN MATRIXNEURO STR 2 HOLE 12MM  04.503.062 - [id number] Metallic Hardware/Adamant IMP PLATE SYN MATRIXNEURO STR 2 HOLE 12MM  [phone]  [employer] N/A N/A 2 Implanted   IMP PLATE SYN LUANA HOLE COVER 17MM 04.503.023 - [id number] Metallic Hardware/Adamant IMP PLATE SYN LUANA HOLE COVER 17MM [phone]  [employer] N/A N/A 2 Implanted   IMP SCR SYN MATRIX LOW PRO 1.5X04MM SELF DRILL 04.503.104.01 - [id number] Metallic Hardware/Adamant IMP SCR SYN MATRIX LOW PRO 1.5X04MM SELF DRILL [id number]  [employer] N/A N/A 12 Implanted   IMP SCR SYN MATRIX LOW PRO 1.5X03MM SELF DRILL 04.503.103.01 - [id number] Metallic Hardware/Adamant IMP SCR SYN MATRIX LOW PRO 1.5X03MM SELF DRILL [id number]  [employer] N/A N/A 1 Implanted   IMP SCR SYN MATRIX LOW PRO 1.5X04MM SELF DRILL 04.503.104.01 - [id number] Metallic Hardware/Adamant IMP SCR SYN MATRIX LOW PRO 1.5X04MM SELF DRILL [id number]  [employer] N/A N/A 1 Wasted   IMP PLATE SYN LUANA HOLE COVER 17MM 04.503.023 - [id number] Metallic Hardware/Adamant  IMP PLATE SYN LUANA HOLE COVER 17MM [phone]  [facility] N/[facility] N/A 1 Wasted

## 2023-11-06 NOTE — ANESTHESIA CARE TRANSFER NOTE
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    Anesthesia Type:   General     Note:    Oropharynx: oropharynx clear of all foreign objects and spontaneously breathing  Level of Consciousness: drowsy and awake  Oxygen Supplementation: face mask  Level of Supplemental Oxygen (L/min / FiO2): 6  Independent Airway: airway patency satisfactory and stable  Dentition: dentition unchanged  Vital Signs Stable: post-procedure vital signs reviewed and stable  Report to RN Given: handoff report given  Patient transferred to: PACU    Handoff Report: Identifed the Patient, Identified the Reponsible Provider, Reviewed the pertinent medical history, Discussed the surgical course, Reviewed Intra-OP anesthesia mangement and issues during anesthesia, Set expectations for post-procedure period and Allowed opportunity for questions and acknowledgement of understanding      Vitals:  Vitals Value Taken Time   /71 11/06/23 1310   Temp     Pulse 75 11/06/23 1315   Resp     SpO2 100 % 11/06/23 1315   Vitals shown include unfiled device data.    Electronically Signed By: Garett Zuniga MD  November 6, 2023  1:17 PM

## 2023-11-06 NOTE — ANESTHESIA PROCEDURE NOTES
Arterial Line Procedure Note    Pre-Procedure   Staff -        Anesthesiologist:  Jack Chatterjee MD       Resident/Fellow: Garett Zuniga MD       Performed By: resident and with residents       Procedure performed by resident/fellow/CRNA in presence of a teaching physician.         Location: OR       Pre-Anesthestic Checklist: patient identified, IV checked, risks and benefits discussed, informed consent, monitors and equipment checked, pre-op evaluation and at physician/surgeon's request  Timeout:       Correct Patient: Yes        Correct Procedure: Yes        Correct Site: Yes        Correct Position: Yes   Line Placement:   This line was placed Post Induction  Procedure   Procedure: arterial line and elective       Diagnosis: Blood Pressure Monitoring and/or Frequent Blood Draws       Laterality: left       Insertion Site: radial.  Sterile Prep        Standard elements of sterile barrier followed       Skin prep: Chloraprep  Insertion/Injection        Technique: Bryan's test completed, Seldinger Technique and ultrasound guided        1. Ultrasound was used to evaluate the access site.       2. Artery evaluated via ultrasound for patency/adequacy.       3. Using real-time ultrasound the needle/catheter was observed entering the artery/vein.       Catheter Type/Size: 20 G, 1.75 in/4.5 cm quick cath (integral wire)  Narrative        Tegaderm dressing used.       Complications: None apparent,        Arterial waveform: Yes        IBP within 10% of NIBP: Yes   Comments:  Routine arterial line placement without complications.

## 2023-11-06 NOTE — OR NURSING
Dr. Duarte at bedside. Labetalol was given for a total of 30mg. Cuff pressure went down with each dose. Dr. Duarte said to go by cuff pressure not arterial pressure.

## 2023-11-06 NOTE — OP NOTE
Date of service:  11/06/23    Preoperative diagnosis:  Right encephalocele and CSF leak    Postoperative diagnosis:  Right encephalocele and CSF leak    Procedure:  1.  Right middle fossa approach for repair of encephalocele and CSF leak  2.  Facial nerve monitoring x 4 hours    Surgeon:  Kim Ford MD and Rishabh Gusman MD    Fellow:  Liz Gonzalez MD    Resident:  jP Rios MD, Romaine Storey MD    Anesthesia:  General endotracheal    EBL:  100 cc    Specimens:  None    Drains:  Lumbar drain    Complications:  None    Findings:  Tegmen tympani and tegmen mastoideum defect with encephalocele measuring approximately 3 cm x 1 cm. Exposed malleus head and short process of the incus. Smaller pinpoint second defect lateral to the large defect.     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  Risks and benefits of a middle fossa approach for resection were had with the patient and informed consent was obtained in the clinic.  This was confirmed in the preoperative area.    Procedure:  The patient was brought into the operating room and placed supine on the operating room table.  A brief had been performed already.  General anesthesia was induced and endotracheal intubation was performed.  The patient was turned 180 degrees and a mueller catheter and arterial line was placed.  The patient was then positioned for lumbar drain placement which was done by Neurosurgery.  The patient was then positioned in a lateral position for the procedure.  1% lidocaine with 1:100,000 epinephrine was injected into the planned scalp incision.  Facial nerve monitor electrodes were placed on the right face and connected to the nerve monitor.  Impedances were checked and a tap test was performed.  The monitor was used for the entirety of the case. The patient was then prepped and draped in standard surgical fashion.  Time Out was performed with  identification of the patient, side of the procedure and procedures to be done.    Neurosurgery then opened the incision and performed a craniotomy.  Please see their dictation for this portion of the procedure.    Neurotology was then called into the case.  The operating microscope was brought into position and used for the entirety of our portion of the case.  The dura was elevated off the floor of the middle fossa in a posterior to anterior fashion.  As the dura was elevated, a small pinpoint tegmen mastoideum defect was identified. No CSF leak identified at that defect. A second larger defect was identified extending from the tegmen tympani to the tegmen mastoideum with a meningoencephalocele. The meningoencephalocele was resected at the junction of the dural defect and removed from the mastoid and middle ear. The defect was explored and there was surrounding weakened tegmen mastoideum that flaked off with almost no force. The final defect measured approximately 3 cm x 1 cm. The malleus and short process of the incus were visualized in the dehiscence. They were intact and mobile. A chrissy hole plate was fashioned to cover the defect and was secured in 2 lateral positions.    The patient tolerated Neurotology's portion of the procedure well and remained in stable condition.  Neurosurgery then proceeded with closure of the dura defect, tegmen defect and incision.     Kim FRANK MD, was scrubbed during the entire procedure.

## 2023-11-06 NOTE — ANESTHESIA PROCEDURE NOTES
Airway       Patient location during procedure: OR       Procedure Start/Stop Times: 11/6/2023 7:50 AM  Staff -        Anesthesiologist:  Jack Chatterjee MD       Resident/Fellow: Garett Zuniga MD       Performed By: resident, with residents and other anesthesia staff       Procedure performed by resident/fellow/CRNA in presence of a teaching physician.    Consent for Airway        Urgency: elective  Indications and Patient Condition       Indications for airway management: wes-procedural and airway protection       Induction type:intravenous       Mask difficulty assessment: 1 - vent by mask    Final Airway Details       Final airway type: endotracheal airway       Successful airway: ETT - single  Endotracheal Airway Details        ETT size (mm): 7.0       Cuffed: yes       Successful intubation technique: video laryngoscopy       VL Blade Size: MAC 3       Grade View of Cords: 1       Adjucts: stylet       Position: Right       Measured from: gums/teeth       Secured at (cm): 23       Bite block used: None    Post intubation assessment        Placement verified by: capnometry, equal breath sounds and chest rise        Number of attempts at approach: 1       Number of other approaches attempted: 0       Secured with: pink tape       Ease of procedure: easy       Dentition: Unchanged and Intact    Medication(s) Administered   Medication Administration Time: 11/6/2023 7:50 AM    Additional Comments       PLACED BY MEDICAL STUDENT FUNMILAYO ISRAEL

## 2023-11-06 NOTE — ANESTHESIA POSTPROCEDURE EVALUATION
Patient: Wendy Dodd    Procedure: Procedure(s):  Stealth assisted CRANIOTOMY, MIDDLE FOSSA APPROACH, FOR ENCEPHALOCELE REPAIR, WITH TEMPORALIS MUSCLE FLAP  Insert drain lumbar       Anesthesia Type:  General    Note:  Disposition: Inpatient   Postop Pain Control: Uneventful            Sign Out: Well controlled pain   PONV: No   Neuro/Psych: Uneventful            Sign Out: Acceptable/Baseline neuro status   Airway/Respiratory: Uneventful            Sign Out: Acceptable/Baseline resp. status   CV/Hemodynamics: Uneventful            Sign Out: Acceptable CV status; No obvious hypovolemia; No obvious fluid overload   Other NRE: NONE   DID A NON-ROUTINE EVENT OCCUR? No           Last vitals:  Vitals Value Taken Time   /87 11/06/23 1410   Temp     Pulse 77 11/06/23 1417   Resp     SpO2 95 % 11/06/23 1417   Vitals shown include unfiled device data.    Electronically Signed By: Leslie Goldberg, MD  November 6, 2023  2:19 PM

## 2023-11-07 ENCOUNTER — APPOINTMENT (OUTPATIENT)
Dept: PHYSICAL THERAPY | Facility: CLINIC | Age: 63
DRG: 025 | End: 2023-11-07
Payer: COMMERCIAL

## 2023-11-07 LAB
ANION GAP SERPL CALCULATED.3IONS-SCNC: 9 MMOL/L (ref 7–15)
BUN SERPL-MCNC: 10.4 MG/DL (ref 8–23)
CALCIUM SERPL-MCNC: 9.9 MG/DL (ref 8.8–10.2)
CHLORIDE SERPL-SCNC: 110 MMOL/L (ref 98–107)
CREAT SERPL-MCNC: 0.5 MG/DL (ref 0.51–0.95)
DEPRECATED HCO3 PLAS-SCNC: 24 MMOL/L (ref 22–29)
EGFRCR SERPLBLD CKD-EPI 2021: >90 ML/MIN/1.73M2
ERYTHROCYTE [DISTWIDTH] IN BLOOD BY AUTOMATED COUNT: 13 % (ref 10–15)
GLUCOSE BLDC GLUCOMTR-MCNC: 106 MG/DL (ref 70–99)
GLUCOSE BLDC GLUCOMTR-MCNC: 123 MG/DL (ref 70–99)
GLUCOSE BLDC GLUCOMTR-MCNC: 141 MG/DL (ref 70–99)
GLUCOSE BLDC GLUCOMTR-MCNC: 173 MG/DL (ref 70–99)
GLUCOSE BLDC GLUCOMTR-MCNC: 177 MG/DL (ref 70–99)
GLUCOSE SERPL-MCNC: 166 MG/DL (ref 70–99)
HCT VFR BLD AUTO: 36 % (ref 35–47)
HGB BLD-MCNC: 11.8 G/DL (ref 11.7–15.7)
MAGNESIUM SERPL-MCNC: 1.9 MG/DL (ref 1.7–2.3)
MCH RBC QN AUTO: 28.7 PG (ref 26.5–33)
MCHC RBC AUTO-ENTMCNC: 32.8 G/DL (ref 31.5–36.5)
MCV RBC AUTO: 88 FL (ref 78–100)
PHOSPHATE SERPL-MCNC: 3.1 MG/DL (ref 2.5–4.5)
PLATELET # BLD AUTO: 233 10E3/UL (ref 150–450)
POTASSIUM SERPL-SCNC: 3.8 MMOL/L (ref 3.4–5.3)
RBC # BLD AUTO: 4.11 10E6/UL (ref 3.8–5.2)
SODIUM SERPL-SCNC: 143 MMOL/L (ref 135–145)
WBC # BLD AUTO: 12.5 10E3/UL (ref 4–11)

## 2023-11-07 PROCEDURE — 97116 GAIT TRAINING THERAPY: CPT | Mod: GP

## 2023-11-07 PROCEDURE — 83735 ASSAY OF MAGNESIUM: CPT

## 2023-11-07 PROCEDURE — 97530 THERAPEUTIC ACTIVITIES: CPT | Mod: GP

## 2023-11-07 PROCEDURE — 80048 BASIC METABOLIC PNL TOTAL CA: CPT

## 2023-11-07 PROCEDURE — 250N000013 HC RX MED GY IP 250 OP 250 PS 637: Performed by: NEUROLOGICAL SURGERY

## 2023-11-07 PROCEDURE — 250N000013 HC RX MED GY IP 250 OP 250 PS 637

## 2023-11-07 PROCEDURE — 250N000012 HC RX MED GY IP 250 OP 636 PS 637

## 2023-11-07 PROCEDURE — 120N000002 HC R&B MED SURG/OB UMMC

## 2023-11-07 PROCEDURE — 250N000009 HC RX 250

## 2023-11-07 PROCEDURE — 258N000003 HC RX IP 258 OP 636

## 2023-11-07 PROCEDURE — 85027 COMPLETE CBC AUTOMATED: CPT

## 2023-11-07 PROCEDURE — 84100 ASSAY OF PHOSPHORUS: CPT

## 2023-11-07 PROCEDURE — 97161 PT EVAL LOW COMPLEX 20 MIN: CPT | Mod: GP

## 2023-11-07 PROCEDURE — 97110 THERAPEUTIC EXERCISES: CPT | Mod: GP

## 2023-11-07 RX ORDER — OFLOXACIN 3 MG/ML
5 SOLUTION AURICULAR (OTIC) 2 TIMES DAILY
Status: DISCONTINUED | OUTPATIENT
Start: 2023-11-07 | End: 2023-11-10

## 2023-11-07 RX ORDER — MAGNESIUM OXIDE 400 MG/1
400 TABLET ORAL EVERY 4 HOURS
Status: COMPLETED | OUTPATIENT
Start: 2023-11-07 | End: 2023-11-07

## 2023-11-07 RX ORDER — POTASSIUM CHLORIDE 750 MG/1
20 TABLET, EXTENDED RELEASE ORAL ONCE
Status: COMPLETED | OUTPATIENT
Start: 2023-11-07 | End: 2023-11-07

## 2023-11-07 RX ORDER — OXYCODONE HYDROCHLORIDE 5 MG/1
5 TABLET ORAL EVERY 4 HOURS PRN
Status: DISCONTINUED | OUTPATIENT
Start: 2023-11-07 | End: 2023-11-08

## 2023-11-07 RX ADMIN — OXYCODONE HYDROCHLORIDE 5 MG: 5 TABLET ORAL at 15:11

## 2023-11-07 RX ADMIN — LEVOTHYROXINE SODIUM 125 MCG: 125 TABLET ORAL at 08:46

## 2023-11-07 RX ADMIN — OFLOXACIN 5 DROP: 3 SOLUTION AURICULAR (OTIC) at 22:03

## 2023-11-07 RX ADMIN — MAGNESIUM OXIDE TAB 400 MG (241.3 MG ELEMENTAL MG) 400 MG: 400 (241.3 MG) TAB at 06:02

## 2023-11-07 RX ADMIN — LEVETIRACETAM 500 MG: 500 TABLET, FILM COATED ORAL at 20:16

## 2023-11-07 RX ADMIN — INSULIN ASPART 1 UNITS: 100 INJECTION, SOLUTION INTRAVENOUS; SUBCUTANEOUS at 11:33

## 2023-11-07 RX ADMIN — INSULIN ASPART 1 UNITS: 100 INJECTION, SOLUTION INTRAVENOUS; SUBCUTANEOUS at 17:21

## 2023-11-07 RX ADMIN — LEVETIRACETAM 500 MG: 500 TABLET, FILM COATED ORAL at 08:47

## 2023-11-07 RX ADMIN — OXYCODONE HYDROCHLORIDE 5 MG: 5 TABLET ORAL at 22:22

## 2023-11-07 RX ADMIN — ACETAMINOPHEN 975 MG: 325 TABLET, FILM COATED ORAL at 08:46

## 2023-11-07 RX ADMIN — SENNOSIDES AND DOCUSATE SODIUM 1 TABLET: 8.6; 5 TABLET ORAL at 20:16

## 2023-11-07 RX ADMIN — SENNOSIDES AND DOCUSATE SODIUM 1 TABLET: 8.6; 5 TABLET ORAL at 08:47

## 2023-11-07 RX ADMIN — SODIUM CHLORIDE: 9 INJECTION, SOLUTION INTRAVENOUS at 02:42

## 2023-11-07 RX ADMIN — ACETAMINOPHEN 975 MG: 325 TABLET, FILM COATED ORAL at 15:11

## 2023-11-07 RX ADMIN — POTASSIUM CHLORIDE 20 MEQ: 750 TABLET, EXTENDED RELEASE ORAL at 06:02

## 2023-11-07 RX ADMIN — MAGNESIUM OXIDE TAB 400 MG (241.3 MG ELEMENTAL MG) 400 MG: 400 (241.3 MG) TAB at 10:16

## 2023-11-07 RX ADMIN — POLYETHYLENE GLYCOL 3350 17 G: 17 POWDER, FOR SOLUTION ORAL at 10:15

## 2023-11-07 RX ADMIN — ATORVASTATIN CALCIUM 10 MG: 10 TABLET, FILM COATED ORAL at 08:47

## 2023-11-07 ASSESSMENT — VISUAL ACUITY
OU: GLASSES;BASELINE

## 2023-11-07 ASSESSMENT — ACTIVITIES OF DAILY LIVING (ADL)
ADLS_ACUITY_SCORE: 23
ADLS_ACUITY_SCORE: 22
ADLS_ACUITY_SCORE: 23
ADLS_ACUITY_SCORE: 22
ADLS_ACUITY_SCORE: 23
ADLS_ACUITY_SCORE: 23

## 2023-11-07 NOTE — PROGRESS NOTES
"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     She is POD 1 from Right middle fossa approach for repair of encephalocele and CSF leak and placement of lumbar drain    Clinically Significant Risk Factors Present on Admission                # Drug Induced Platelet Defect: home medication list includes an antiplatelet medication   # Hypertension: Noted on problem list     # DMII: A1C = 6.6 % (Ref range: 0.0 - 5.6 %) within past 6 months    # Obesity: Estimated body mass index is 31.42 kg/m  as calculated from the following:    Height as of this encounter: 1.702 m (5' 7\").    Weight as of this encounter: 91 kg (200 lb 9.9 oz).           Plan:  Serial neuro exams  LD at 10, drain for now  Pain control  HOB > 30 degrees  SBP <140  Resume PTA diltiazem and lisinopril  Keppra 7d  On MD ISS  Will discuss ASA timing with staff (takes for heart health)- can be stopped  Advance diet as tolerated  Bowel regimen  PRN antiemetics  IVF until taking adequate PO  PT/OT  SCDs for DVT proph    -----------------------------------  Melvin Huntley MD  Neurosurgery resident  Pager [phone]  -----------------------------------    Gen: Appears comfortable, NAD  Wound: clean, dry, intact  Neurologic:  Alert & Oriented to person, place, time, and situation  Follows commands briskly  Speech fluent, spontaneous. No aphasia or dysarthria.  No gaze preference. No apparent hemineglect.  PERRL, EOMI, Dysconjugate gaze- baseline  Face symmetric with sensation intact to light touch  Right hearing loss  Palate elevates symmetrically, uvula midline, tongue protrudes midline  Trapezii muscles 5/5 bilaterally  No pronator drift     Del Tr Bi WE WF Gr   R 5 5 5 5 5 5   L 5 5 5 5 5 5    " HF KE KF DF PF EHL   R 5 5 5 5 5 5   L 5 5 5 5 5 5     Reflexes 2+ throughout    Sensation intact and symmetric to light touch throughout    Objective:   Temp:  [97.6  F (36.4  C)-98.2  F (36.8  C)] 97.9  F (36.6  C)  Pulse:  [66-86] 66  Resp:  [9-20] 19  BP: ()/(59-98) 94/67  MAP:  [109 mmHg-120 mmHg] 112 mmHg  Arterial Line BP: (157-168)/(79-87) 163/81  SpO2:  [91 %-100 %] 96 %  I/O last 3 completed shifts:  In: 2422.33 [P.O.:360; I.V.:1562.33; IV Piggyback:250]  Out: 2755 [Urine:2475; Drains:130; Other:50; Blood:100]        LABS:  Recent Labs   Lab 11/06/23  2204 11/06/23  1537 11/06/23  0716 10/31/23  0746   POTASSIUM  --   --   --  4.9   * 173* 131*  --        Recent Labs   Lab 10/31/23  0746   HGB 15.7       IMAGING:  No results found for this or any previous visit (from the past 24 hour(s)).    Please contact neurosurgery resident on call with questions.    Dial * * *[phone], enter [phone] when prompted.     I have reviewed the history above and agree with the resident's assessment and plan.  IRMA Gusman MD

## 2023-11-07 NOTE — PROGRESS NOTES
"Otolaryngology Progress Note  November 7, 2023     SUBJECTIVE: No acute events overnight. Denies salty/metallic taste, denies dizziness changes. 2L O2 overnight.      OBJECTIVE:   /42 (BP Location: Right arm)   Pulse 73   Temp 98.6  F (37  C) (Oral)   Resp 16   Ht 1.626 m (5' 4\")   Wt 69.3 kg (152 lb 12.5 oz)   SpO2 95%   BMI 26.22 kg/m     General: Alert and oriented x 3, No acute distress   HEENT: right incision covered with dressing. Some dried blood in ear canal.    Pulmonary: Breathing non-labored, no stridor, no accessory muscle use.    LABS:  ROUTINE IP LABS (Last four results)  BMP  Recent Labs   Lab 11/07/23  1132 11/07/23  0836 11/07/23  0602 11/07/23  0415   NA  --   --   --  143   POTASSIUM  --   --   --  3.8   CHLORIDE  --   --   --  110*   JAYCOB  --   --   --  9.9   CO2  --   --   --  24   BUN  --   --   --  10.4   CR  --   --   --  0.50*   * 106* 123* 166*     CBC  Recent Labs   Lab 11/07/23  0415   WBC 12.5*   RBC 4.11   HGB 11.8   HCT 36.0   MCV 88   MCH 28.7   MCHC 32.8   RDW 13.0        INRNo lab results found in last 7 days.    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   - lumbar drain per NSGY   - Sinus Precautions:                   - Do not use a straw                  - Do not blow your nose                  - Do not strain (take stool softeners)                  - Open your mouth when you sneeze                  - No lifting greater than 20 lbs                  - Try not to lean forward and allow blood to build up in your head.    - Remainder of cares per primary team     -- Patient and above plan discussed with Dr Logan Xie MD  Otolaryngology PGY4  "

## 2023-11-07 NOTE — PROGRESS NOTES
Neuro: Patient is A&OX4, intact, follows commands, diminished hearing in right ear, some bloody drainage from R  ear- team aware. Denied pain overnight.  CV: SB/SR w/1 degree AVB. Pulses palpable. MAPs>65, SBP<140.  Pulm: 2L O2 overnight, pt has hx of sleep apnea. Room air in the day. LCTA. Afebrile.  GI/: High out this morning, adequate UOP overnight. No BM. Regular diet.  Lines: L radial art line. PIV X2, sl.  Skin: R incision around ear, sutures, CDI.     Plan: Continue to monitor. Update team w/any new concerns. Transfer to floor when bed is available.     For vital signs and complete assessments, please see documentation flowsheets.

## 2023-11-07 NOTE — PLAN OF CARE
Admitted/transferred from: PACU   Reason for admission/transfer: ICU monitoring    2 RN skin assessment: completed by: Rose Rosario RN, Philippe Noble RN   Result of skin assessment and interventions/actions: Intact except for lumbar drain and R head incision.   Height, weight, drug calc weight: Done  Patient belongings (see Flowsheet)  MDRO education added to care plan: N/A      Major Shift Events: Neuro assessment intact except for mild dizziness with transfers. Lumbar drain managed per orders. Some dark red drainage noted from R ear, Neurosurgery notified and assessed patient at bedside. Lung sounds clear. Patient on room air. SR with 1st degree AVB. SBP < 140 and MAP > 65 without intervention. Afebrile. Passed bedside swallow eval. Tolerating clear liquids and pudding. High in place with adequate urine output. Pain well controlled with scheduled Tylenol.   Updated Sister Enriqueta and Family at bedside.   Plan: Continue care per orders.   For vital signs and complete assessments, please see documentation flowsheets.    ?

## 2023-11-07 NOTE — PROGRESS NOTES
11/07/23 1000   Appointment Info   Signing Clinician's Name / Credentials (PT) Rasheeda Acevedo, MARCK   Student Supervision Direct Patient Contact Provided   Rehab Comments (PT) hard of hearing on R, lumbar drain   Living Environment   People in Home spouse   Current Living Arrangements house   Home Accessibility stairs to enter home;stairs within home   Number of Stairs, Main Entrance 1   Stair Railings, Main Entrance none   Number of Stairs, Within Home, Primary ten   Transportation Anticipated family or friend will provide   Living Environment Comments Pt lives IND at home with spouse, everything accessible on first floor.   Self-Care   Usual Activity Tolerance good   Current Activity Tolerance fair   Equipment Currently Used at Home none   Fall history within last six months no   Activity/Exercise/Self-Care Comment IND with mobility and ADLs.   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     She is POD 1 from Right middle fossa approach for repair of encephalocele and CSF leak and placement of lumbar drain   Existing Precautions/Restrictions   (crani)   Heart Disease Risk Factors Diabetes;High blood pressure;Overweight;Age   Cognition   Affect/Mental Status (Cognition) WFL   Orientation Status (Cognition) oriented x 4   Follows Commands (Cognition) follows multi-step commands;over 90% accuracy   Pain Assessment   Patient Currently in Pain Yes, see Vital Sign flowsheet   Integumentary/Edema   Integumentary/Edema Comments no edema noted   Posture    Posture Forward head position   Range of Motion (ROM)   Range of Motion ROM is WFL   ROM Comment ROM  grossly intact BUE and BLE   Strength (Manual Muscle Testing)   Strength (Manual Muscle Testing) strength is WFL   Strength Comments strength grossly intact BUE and BLE   Bed Mobility   Comment, (Bed Mobility) impaired per clinical reasoning   Transfers   Comment, (Transfers) sit <> stand SBA   Gait/Stairs (Locomotion)   Comment, (Gait/Stairs) gait CGA no AD   Balance   Balance Comments sitting balance good, static standing balance good, dynamic standing balance impaired   Sensory Examination   Sensory Perception Comments pt reports no sensation changes   Clinical Impression   Criteria for Skilled Therapeutic Intervention Yes, treatment indicated   PT Diagnosis (PT) impaired functional mobility   Influenced by the following impairments balance, activity tolerance, dizziness/lightheadedness, precautions   Functional limitations due to impairments gait, stairs, transfers, bed mobility, functional endurance   Clinical Presentation (PT Evaluation Complexity) stable   Clinical Presentation Rationale clinical reasoning   Clinical Decision Making (Complexity) low complexity   Planned Therapy Interventions (PT) balance training;bed mobility training;gait training;home exercise program;motor coordination training;neuromuscular re-education;patient/family education;postural re-education;ROM (range of motion);stair training;strengthening;stretching;transfer training;progressive activity/exercise;risk factor education;home program guidelines   Risk & Benefits of therapy have been explained evaluation/treatment results reviewed;care plan/treatment goals reviewed;risks/benefits reviewed;participants voiced agreement with care plan;participants included;patient;spouse/significant other   PT Total Evaluation Time   PT Eval, Low Complexity Minutes (11522) 8   Physical Therapy Goals   PT Frequency 6x/week   PT Predicted Duration/Target Date for Goal Attainment 11/21/23   PT Goals Bed Mobility;Transfers;Gait;Stairs   PT: Bed Mobility  Independent;Supine to/from sit;Within precautions   PT: Transfers Independent;Sit to/from stand;Within precautions   PT: Gait Independent;Within precautions;Greater than 200 feet   PT: Stairs Independent;Within precautions;1 stair   PT Discharge Planning   PT Plan progress ambulation, dynamic standing balance, stairs   PT Discharge Recommendation (DC Rec) home with assist   PT Rationale for DC Rec Anticipate pt will be able to function independently at home with occasional assistance from spouse. Pt only needing SBA for transfers and CGA for ambulation currently.   PT Brief overview of current status SBA transfers, CGA ambulation   Total Session Time   Timed Code Treatment Minutes 28   Total Session Time (sum of timed and untimed services) 36

## 2023-11-07 NOTE — PLAN OF CARE
Major Shift Events:  Pt;s neuro status unchanged. Pt reports decrease hearing in right ear. Pt has minimal bloody drainage from R ear. LD drained 10cc q 1hr.  Pt c/o dull frontal headache. Pt is on scheduled Tylenol.  L arterial line dc'd. Pt up to chair and walked in halls with SBA. Pt w/ no c/o nausea. Pt voiding spontaneously. Pt's family at bedside.  Plan: Continue plan of care. Transfer to  when bed available.  For vital signs and complete assessments, please see documentation flowsheets.       Goal Outcome Evaluation:      Plan of Care Reviewed With: patient, family

## 2023-11-08 ENCOUNTER — APPOINTMENT (OUTPATIENT)
Dept: OCCUPATIONAL THERAPY | Facility: CLINIC | Age: 63
DRG: 025 | End: 2023-11-08
Payer: COMMERCIAL

## 2023-11-08 ENCOUNTER — APPOINTMENT (OUTPATIENT)
Dept: PHYSICAL THERAPY | Facility: CLINIC | Age: 63
DRG: 025 | End: 2023-11-08
Attending: NEUROLOGICAL SURGERY
Payer: COMMERCIAL

## 2023-11-08 LAB
ANION GAP SERPL CALCULATED.3IONS-SCNC: 8 MMOL/L (ref 7–15)
BUN SERPL-MCNC: 9.7 MG/DL (ref 8–23)
CALCIUM SERPL-MCNC: 9.9 MG/DL (ref 8.8–10.2)
CHLORIDE SERPL-SCNC: 107 MMOL/L (ref 98–107)
CREAT SERPL-MCNC: 0.62 MG/DL (ref 0.51–0.95)
DEPRECATED HCO3 PLAS-SCNC: 26 MMOL/L (ref 22–29)
EGFRCR SERPLBLD CKD-EPI 2021: >90 ML/MIN/1.73M2
ERYTHROCYTE [DISTWIDTH] IN BLOOD BY AUTOMATED COUNT: 13.2 % (ref 10–15)
GLUCOSE BLDC GLUCOMTR-MCNC: 140 MG/DL (ref 70–99)
GLUCOSE BLDC GLUCOMTR-MCNC: 161 MG/DL (ref 70–99)
GLUCOSE BLDC GLUCOMTR-MCNC: 171 MG/DL (ref 70–99)
GLUCOSE BLDC GLUCOMTR-MCNC: 193 MG/DL (ref 70–99)
GLUCOSE SERPL-MCNC: 172 MG/DL (ref 70–99)
HCT VFR BLD AUTO: 38.6 % (ref 35–47)
HGB BLD-MCNC: 12.4 G/DL (ref 11.7–15.7)
MAGNESIUM SERPL-MCNC: 2.1 MG/DL (ref 1.7–2.3)
MCH RBC QN AUTO: 29.1 PG (ref 26.5–33)
MCHC RBC AUTO-ENTMCNC: 32.1 G/DL (ref 31.5–36.5)
MCV RBC AUTO: 91 FL (ref 78–100)
PHOSPHATE SERPL-MCNC: 2.3 MG/DL (ref 2.5–4.5)
PLATELET # BLD AUTO: 222 10E3/UL (ref 150–450)
POTASSIUM SERPL-SCNC: 5.1 MMOL/L (ref 3.4–5.3)
RBC # BLD AUTO: 4.26 10E6/UL (ref 3.8–5.2)
SODIUM SERPL-SCNC: 141 MMOL/L (ref 135–145)
WBC # BLD AUTO: 8.8 10E3/UL (ref 4–11)

## 2023-11-08 PROCEDURE — 83735 ASSAY OF MAGNESIUM: CPT

## 2023-11-08 PROCEDURE — 80048 BASIC METABOLIC PNL TOTAL CA: CPT

## 2023-11-08 PROCEDURE — 84100 ASSAY OF PHOSPHORUS: CPT

## 2023-11-08 PROCEDURE — 250N000013 HC RX MED GY IP 250 OP 250 PS 637

## 2023-11-08 PROCEDURE — 97116 GAIT TRAINING THERAPY: CPT | Mod: GP | Performed by: REHABILITATION PRACTITIONER

## 2023-11-08 PROCEDURE — 85027 COMPLETE CBC AUTOMATED: CPT

## 2023-11-08 PROCEDURE — 120N000002 HC R&B MED SURG/OB UMMC

## 2023-11-08 PROCEDURE — 36415 COLL VENOUS BLD VENIPUNCTURE: CPT

## 2023-11-08 PROCEDURE — 97530 THERAPEUTIC ACTIVITIES: CPT | Mod: GP | Performed by: REHABILITATION PRACTITIONER

## 2023-11-08 PROCEDURE — 250N000013 HC RX MED GY IP 250 OP 250 PS 637: Performed by: NURSE PRACTITIONER

## 2023-11-08 PROCEDURE — 97535 SELF CARE MNGMENT TRAINING: CPT | Mod: GO | Performed by: OCCUPATIONAL THERAPIST

## 2023-11-08 PROCEDURE — 97165 OT EVAL LOW COMPLEX 30 MIN: CPT | Mod: GO | Performed by: OCCUPATIONAL THERAPIST

## 2023-11-08 PROCEDURE — 250N000013 HC RX MED GY IP 250 OP 250 PS 637: Performed by: NEUROLOGICAL SURGERY

## 2023-11-08 PROCEDURE — 250N000011 HC RX IP 250 OP 636

## 2023-11-08 RX ORDER — OXYCODONE HYDROCHLORIDE 5 MG/1
5-10 TABLET ORAL EVERY 4 HOURS PRN
Qty: 8 TABLET | Refills: 0 | Status: SHIPPED | OUTPATIENT
Start: 2023-11-08 | End: 2023-11-11

## 2023-11-08 RX ORDER — NALOXONE HYDROCHLORIDE 0.4 MG/ML
0.4 INJECTION, SOLUTION INTRAMUSCULAR; INTRAVENOUS; SUBCUTANEOUS
Status: DISCONTINUED | OUTPATIENT
Start: 2023-11-08 | End: 2023-11-11 | Stop reason: HOSPADM

## 2023-11-08 RX ORDER — PROCHLORPERAZINE 25 MG
25 SUPPOSITORY, RECTAL RECTAL EVERY 12 HOURS PRN
Status: DISCONTINUED | OUTPATIENT
Start: 2023-11-08 | End: 2023-11-11 | Stop reason: HOSPADM

## 2023-11-08 RX ORDER — ASPIRIN 81 MG/1
TABLET ORAL
COMMUNITY
Start: 2023-11-08

## 2023-11-08 RX ORDER — CAFFEINE 200 MG
200 TABLET ORAL ONCE
Status: COMPLETED | OUTPATIENT
Start: 2023-11-08 | End: 2023-11-08

## 2023-11-08 RX ORDER — OFLOXACIN 3 MG/ML
5 SOLUTION AURICULAR (OTIC) 2 TIMES DAILY
Qty: 10 ML | Refills: 0 | Status: SHIPPED | OUTPATIENT
Start: 2023-11-08 | End: 2023-11-11

## 2023-11-08 RX ORDER — PROCHLORPERAZINE MALEATE 5 MG
5 TABLET ORAL EVERY 6 HOURS PRN
Status: DISCONTINUED | OUTPATIENT
Start: 2023-11-08 | End: 2023-11-11 | Stop reason: HOSPADM

## 2023-11-08 RX ORDER — ACETAMINOPHEN 325 MG/1
975 TABLET ORAL EVERY 6 HOURS PRN
Qty: 30 TABLET | Refills: 0 | Status: SHIPPED | OUTPATIENT
Start: 2023-11-08 | End: 2023-11-20

## 2023-11-08 RX ORDER — CYCLOBENZAPRINE HCL 10 MG
10 TABLET ORAL EVERY 8 HOURS PRN
Qty: 12 TABLET | Refills: 0 | Status: SHIPPED | OUTPATIENT
Start: 2023-11-08 | End: 2023-11-20

## 2023-11-08 RX ORDER — OXYCODONE HYDROCHLORIDE 5 MG/1
5-10 TABLET ORAL EVERY 4 HOURS PRN
Status: DISCONTINUED | OUTPATIENT
Start: 2023-11-08 | End: 2023-11-09

## 2023-11-08 RX ORDER — LEVETIRACETAM 500 MG/1
500 TABLET ORAL 2 TIMES DAILY
Qty: 10 TABLET | Refills: 0 | Status: SHIPPED | OUTPATIENT
Start: 2023-11-08 | End: 2023-11-11

## 2023-11-08 RX ORDER — AMOXICILLIN 250 MG
1 CAPSULE ORAL 2 TIMES DAILY
Qty: 28 TABLET | Refills: 0 | Status: SHIPPED | OUTPATIENT
Start: 2023-11-08 | End: 2023-11-22

## 2023-11-08 RX ORDER — NALOXONE HYDROCHLORIDE 0.4 MG/ML
0.2 INJECTION, SOLUTION INTRAMUSCULAR; INTRAVENOUS; SUBCUTANEOUS
Status: DISCONTINUED | OUTPATIENT
Start: 2023-11-08 | End: 2023-11-11 | Stop reason: HOSPADM

## 2023-11-08 RX ORDER — FERROUS SULFATE 324(65)MG
1 TABLET, DELAYED RELEASE (ENTERIC COATED) ORAL DAILY
COMMUNITY

## 2023-11-08 RX ORDER — POLYETHYLENE GLYCOL 3350 17 G/17G
17 POWDER, FOR SOLUTION ORAL DAILY
Qty: 510 G | Refills: 0 | Status: SHIPPED | OUTPATIENT
Start: 2023-11-08 | End: 2023-11-20

## 2023-11-08 RX ORDER — CAFFEINE 200 MG
200 TABLET ORAL DAILY PRN
Qty: 8 TABLET | Refills: 0 | Status: SHIPPED | OUTPATIENT
Start: 2023-11-08 | End: 2023-11-14

## 2023-11-08 RX ORDER — ONDANSETRON 2 MG/ML
4 INJECTION INTRAMUSCULAR; INTRAVENOUS EVERY 6 HOURS PRN
Status: DISCONTINUED | OUTPATIENT
Start: 2023-11-08 | End: 2023-11-09

## 2023-11-08 RX ORDER — ONDANSETRON 4 MG/1
4-8 TABLET, ORALLY DISINTEGRATING ORAL EVERY 6 HOURS PRN
Status: DISCONTINUED | OUTPATIENT
Start: 2023-11-08 | End: 2023-11-09

## 2023-11-08 RX ADMIN — CAFFEINE 200 MG: 200 TABLET ORAL at 16:01

## 2023-11-08 RX ADMIN — OFLOXACIN 5 DROP: 3 SOLUTION AURICULAR (OTIC) at 12:17

## 2023-11-08 RX ADMIN — ACETAMINOPHEN 975 MG: 325 TABLET, FILM COATED ORAL at 23:51

## 2023-11-08 RX ADMIN — LISINOPRIL 20 MG: 20 TABLET ORAL at 08:12

## 2023-11-08 RX ADMIN — OFLOXACIN 5 DROP: 3 SOLUTION AURICULAR (OTIC) at 20:10

## 2023-11-08 RX ADMIN — LEVOTHYROXINE SODIUM 125 MCG: 125 TABLET ORAL at 08:12

## 2023-11-08 RX ADMIN — LEVETIRACETAM 500 MG: 500 TABLET, FILM COATED ORAL at 12:14

## 2023-11-08 RX ADMIN — POTASSIUM & SODIUM PHOSPHATES POWDER PACK 280-160-250 MG 1 PACKET: 280-160-250 PACK at 20:10

## 2023-11-08 RX ADMIN — ACETAMINOPHEN 975 MG: 325 TABLET, FILM COATED ORAL at 08:12

## 2023-11-08 RX ADMIN — SENNOSIDES AND DOCUSATE SODIUM 1 TABLET: 8.6; 5 TABLET ORAL at 20:10

## 2023-11-08 RX ADMIN — PROCHLORPERAZINE MALEATE 5 MG: 5 TABLET ORAL at 11:28

## 2023-11-08 RX ADMIN — OXYCODONE HYDROCHLORIDE 10 MG: 5 TABLET ORAL at 09:11

## 2023-11-08 RX ADMIN — POTASSIUM & SODIUM PHOSPHATES POWDER PACK 280-160-250 MG 1 PACKET: 280-160-250 PACK at 15:59

## 2023-11-08 RX ADMIN — ACETAMINOPHEN 975 MG: 325 TABLET, FILM COATED ORAL at 15:58

## 2023-11-08 RX ADMIN — OXYCODONE HYDROCHLORIDE 5 MG: 5 TABLET ORAL at 04:18

## 2023-11-08 RX ADMIN — INSULIN ASPART 1 UNITS: 100 INJECTION, SOLUTION INTRAVENOUS; SUBCUTANEOUS at 09:13

## 2023-11-08 RX ADMIN — ACETAMINOPHEN 975 MG: 325 TABLET, FILM COATED ORAL at 00:01

## 2023-11-08 RX ADMIN — INSULIN ASPART 1 UNITS: 100 INJECTION, SOLUTION INTRAVENOUS; SUBCUTANEOUS at 12:13

## 2023-11-08 RX ADMIN — ONDANSETRON 4 MG: 4 TABLET, ORALLY DISINTEGRATING ORAL at 09:06

## 2023-11-08 RX ADMIN — LEVETIRACETAM 500 MG: 500 TABLET, FILM COATED ORAL at 20:10

## 2023-11-08 RX ADMIN — OXYCODONE HYDROCHLORIDE 10 MG: 5 TABLET ORAL at 22:00

## 2023-11-08 ASSESSMENT — ACTIVITIES OF DAILY LIVING (ADL)
ADLS_ACUITY_SCORE: 27
PREVIOUS_RESPONSIBILITIES: MEAL PREP;HOUSEKEEPING;LAUNDRY;SHOPPING;MEDICATION MANAGEMENT;DRIVING;WORK
ADLS_ACUITY_SCORE: 27
IADL_COMMENTS: IND AT BASELINE
ADLS_ACUITY_SCORE: 27

## 2023-11-08 NOTE — PLAN OF CARE
Goal Outcome Evaluation:      Plan of Care Reviewed With: patient    Overall Patient Progress: improvingOverall Patient Progress: improving    Status: POD #2  s/p from Right middle fossa approach for repair of encephalocele and CSF leak and placement of lumbar drain   Vitals: VSS, on RA  Neuros: Intact ex diminished hearing to R ear and no drainage from R ear this shift.  IV: PIV SL   Labs/Electrolytes: WNL, BG checks ACHS   Resp/trach: WNL   Diet: Regular diet, Nasal sinus precaution   Bowel status: LBM 11/5,passing gas  : Voids spontaneously to bathroom x1   Skin: Right head/ear incision BRIDGET, LD in place drain 10 ml q1hr   Pain: pain well managed with schedule tylenol, PRN oxy 5 mg x1 this shift.  Activity: SBA, GB, HOB>30, ambulated to bathroom x1.  SCDs in place   Plan: PT/OT following, recommending home with assist, continue to monitor and follow POC   Shift Updates: No new R ear drainage observed this shift. Slept between cares. Pain managed with scheduled Tylenol, PRN oxy 5mg x1, and cold cloth on forehead and hot pack applied to posterior neck. Continue to monitor.

## 2023-11-08 NOTE — PROGRESS NOTES
"[facility], [place]     Neurosurgery Progress Note:  11/08/2023      Interval History: Patient reports mild headache.  Denies vision changes or dizziness.  Denies salty taste. No signs of leak.      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     She is POD 2 from Right middle fossa approach for repair of encephalocele and CSF leak and placement of lumbar drain    Clinically Significant Risk Factors                  # Hypertension: Noted on problem list       # DMII: A1C = 6.6 % (Ref range: 0.0 - 5.6 %) within past 6 months, PRESENT ON ADMISSION  # Obesity: Estimated body mass index is 31.25 kg/m  as calculated from the following:    Height as of this encounter: 1.702 m (5' 7\").    Weight as of this encounter: 90.5 kg (199 lb 8.3 oz)., PRESENT ON ADMISSION          Plan:  Serial neuro exams  LD at 10, drain for now, plan to clamp later today  Pain control  HOB > 30 degrees  SBP <140  Resume PTA diltiazem and lisinopril  Keppra 7days  Continue to hold ASA  Advance diet as tolerated  Bowel regimen  PRN antiemetics  IVF until taking adequate PO  PT/OT  SCDs for DVT proph    -----------------------------------  ALINA Sanches, CNP  Neurosurgery  Pager [phone]  --------    Gen: Appears comfortable, NAD  Wound: clean, dry, intact  Neurologic:  Alert & Oriented to person, place, time, and situation  Follows commands briskly  Speech fluent, spontaneous. No aphasia or dysarthria.  No gaze preference. No apparent hemineglect.  PERRL, EOMI, Dysconjugate gaze- baseline  Face symmetric with sensation intact to light touch  Right hearing loss  Palate elevates symmetrically, uvula midline, tongue protrudes midline  Trapezii muscles 5/5 bilaterally  No pronator drift     Del Tr Bi WE WF Gr   R 5 5 5 5 5 5   L 5 5 5 5 5 5    HF KE KF DF PF EHL   R 5 5 5 5 5 5   L 5 5 5 5 5 5     Reflexes 2+ throughout    Sensation " intact and symmetric to light touch throughout    Objective:   Temp:  [97.4  F (36.3  C)-98.7  F (37.1  C)] 98.1  F (36.7  C)  Pulse:  [58-78] 58  Resp:  [15-22] 18  BP: (119-146)/(66-87) 138/79  SpO2:  [91 %-100 %] 91 %  I/O last 3 completed shifts:  In: -   Out: 645 [Urine:425; Drains:220]        LABS:  Recent Labs   Lab 11/08/23 0723 11/08/23 0512 11/07/23 2209 11/07/23  0602 11/07/23  0415     --   --   --  143   POTASSIUM 5.1  --   --   --  3.8   CHLORIDE 107  --   --   --  110*   CO2 26  --   --   --  24   ANIONGAP 8  --   --   --  9   * 193* 141*   < > 166*   BUN 9.7  --   --   --  10.4   CR 0.62  --   --   --  0.50*   JAYCOB 9.9  --   --   --  9.9    < > = values in this interval not displayed.       Recent Labs   Lab 11/08/23 0723   WBC 8.8   RBC 4.26   HGB 12.4   HCT 38.6   MCV 91   MCH 29.1   MCHC 32.1   RDW 13.2          IMAGING:  No results found for this or any previous visit (from the past 24 hour(s)).

## 2023-11-08 NOTE — PLAN OF CARE
Vitals: VSS  Neuros: Right hearing diminished. Nasal precautions   IV: PIV SL   Labs/Electrolytes: Phos needs to be replaced, pushed to this afternoon d/t nausea.   Resp/trach: WNL   Diet: Regular diet - Fair po. Was nauseated this am and had 1 emesis. Zofran and compazine given with good relief.   Bowel status: LBM 11/5,passing gas  : Voids spontaneously   Skin: Right head/ear incision BRIDGET. Scant amount of drainage from ear after ear drops were given, has since stopped. LD clamped - No s/s of leak  Pain: Headache managed with tylenol and oxycodone.   Activity: SBA, GB . Walked halls with therapy. Positions self indep in bed   Plan: LD clamped. Encourage po and activity. Family at bedside and helpful

## 2023-11-08 NOTE — PLAN OF CARE
Arrived from:   Belongings/meds: Remains with patient, no meds   2 RN Skin Assessment Completed by: Aida Jackson  Non-intact findings documented (yes/no/NA): Right head/ear incision BRIDGET with intermittent drainage from R ear-team aware    Status: POD 1 s/p from Right middle fossa approach for repair of encephalocele and CSF leak and placement of lumbar drain   Vitals: VSS, on RA  Neuros: Intact ex diminished hearing to R ear and drainage from R ear-NSG was notified   IV: PIV SL   Labs/Electrolytes: WNL, BG checks ACHS   Resp/trach: WNL   Diet: Regular diet, Nasal sinus precaution   Bowel status: LBM 11/5   : Voids spontaneously   Skin: Right head/ear incision BRIDGET with intermittent drainage from R ear-team aware. LD in place drain 10 ml q1hr   Pain: Denies   Activity: SBA, GB, HOB>30, SCDs in place   Plan: PT/OT following, recommending home with assist, continue to monitor and follow POC

## 2023-11-08 NOTE — PHARMACY-ADMISSION MEDICATION HISTORY
Pharmacist Admission Medication History    Admission medication history is complete. The information provided in this note is only as accurate as the sources available at the time of the update.    Information Source(s): Patient, CareEverywhere/SureScripts and RN med history via phone    Pertinent Information: None    Changes made to PTA medication list:    Added: None    Deleted: None    Changed: None    Medication Affordability:       Allergies reviewed with patient and updates made in EHR: no    Medication History Completed By: Win Martinez [facility] 11/8/2023 11:34 AM    PTA Med List   Medication Sig Note Last Dose     aspirin 81 MG tablet Take 1 tablet (81 mg) by mouth daily 11/2/2023: Hold x 7 days 10/30/2023     atorvastatin (LIPITOR) 10 MG tablet Take 1 tablet (10 mg) by mouth daily  10/30/2023     Cyanocobalamin (B-12 PO) Take 1,000 mcg by mouth daily 11/2/2023: Hold DOS 10/30/2023     diltiazem ER (TIAZAC) 360 MG 24 hr ER beaded capsule TAKE ONE CAPSULE BY MOUTH ONCE DAILY 11/2/2023: Hold DOS per PCP 11/5/2023 at 0600     Ferrous Sulfate 324 (65 Fe) MG TBEC Take 1 tablet by mouth daily  10/30/2023     folic acid 800 MCG TABS Take 800 mcg by mouth daily 11/2/2023: Hold DOS 10/30/2023     levothyroxine (SYNTHROID/LEVOTHROID) 125 MCG tablet Take 1 tablet (125 mcg) by mouth daily  11/6/2023 at 0330     lisinopril (ZESTRIL) 20 MG tablet Take 1 tablet (20 mg) by mouth daily 11/2/2023: Hold DOS per PCP 11/5/2023 at 0600     metFORMIN (GLUCOPHAGE XR) 500 MG 24 hr tablet TAKE TWO TABLETS BY MOUTH TWICE A DAY 11/2/2023: Hold AM of surgery  11/5/2023 at 1800     nystatin (MYCOSTATIN) 858022 UNIT/GM external powder APPLY TO AFFECTED AREA(S) THREE TIMES A DAY AS NEEDED FOR RASH  More than a month

## 2023-11-08 NOTE — PROGRESS NOTES
"Otolaryngology Progress Note     SUBJECTIVE: No acute events overnight. Mild headache at vertex. Denies salty/metallic taste, denies dizziness changes.      OBJECTIVE:   /42 (BP Location: Right arm)   Pulse 73   Temp 98.6  F (37  C) (Oral)   Resp 16   Ht 1.626 m (5' 4\")   Wt 69.3 kg (152 lb 12.5 oz)   SpO2 95%   BMI 26.22 kg/m     General: Alert and oriented x 3, No acute distress   HEENT: right incision covered with open to air. Some dried blood in ear canal.   Pulmonary: Breathing non-labored, no stridor, no accessory muscle use.    LABS:  ROUTINE IP LABS (Last four results)  BMP  Recent Labs   Lab 11/08/23  0512 11/07/23  2209 11/07/23  1720 11/07/23  1132 11/07/23  0602 11/07/23  0415   NA  --   --   --   --   --  143   POTASSIUM  --   --   --   --   --  3.8   CHLORIDE  --   --   --   --   --  110*   JAYCOB  --   --   --   --   --  9.9   CO2  --   --   --   --   --  24   BUN  --   --   --   --   --  10.4   CR  --   --   --   --   --  0.50*   * 141* 173* 177*   < > 166*    < > = values in this interval not displayed.     CBC  Recent Labs   Lab 11/07/23  0415   WBC 12.5*   RBC 4.11   HGB 11.8   HCT 36.0   MCV 88   MCH 28.7   MCHC 32.8   RDW 13.0        INRNo lab results found in last 7 days.    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   - lumbar drain per NSGY   - Ofloxacin drops BID to right ear  - Sinus Precautions:                   - Do not use a straw                  - Do not blow your nose                  - Do not strain (take stool softeners)                  - Open your mouth when you sneeze                  - No lifting greater than 20 lbs                  - Try not to lean forward and allow blood to build up in your head.    - Remainder of cares per primary team     -- Patient and above plan discussed with Dr" Logan Rios MD  Otolaryngology Head and Neck Surgery Resident, PGY-2

## 2023-11-09 ENCOUNTER — TELEPHONE (OUTPATIENT)
Dept: NEUROSURGERY | Facility: CLINIC | Age: 63
End: 2023-11-09

## 2023-11-09 ENCOUNTER — APPOINTMENT (OUTPATIENT)
Dept: CT IMAGING | Facility: CLINIC | Age: 63
DRG: 025 | End: 2023-11-09
Payer: COMMERCIAL

## 2023-11-09 LAB
ANION GAP SERPL CALCULATED.3IONS-SCNC: 9 MMOL/L (ref 7–15)
BUN SERPL-MCNC: 8.7 MG/DL (ref 8–23)
CALCIUM SERPL-MCNC: 9.8 MG/DL (ref 8.8–10.2)
CHLORIDE SERPL-SCNC: 105 MMOL/L (ref 98–107)
CREAT SERPL-MCNC: 0.71 MG/DL (ref 0.51–0.95)
DEPRECATED HCO3 PLAS-SCNC: 26 MMOL/L (ref 22–29)
EGFRCR SERPLBLD CKD-EPI 2021: >90 ML/MIN/1.73M2
ERYTHROCYTE [DISTWIDTH] IN BLOOD BY AUTOMATED COUNT: 13.1 % (ref 10–15)
GLUCOSE BLDC GLUCOMTR-MCNC: 174 MG/DL (ref 70–99)
GLUCOSE BLDC GLUCOMTR-MCNC: 178 MG/DL (ref 70–99)
GLUCOSE BLDC GLUCOMTR-MCNC: 184 MG/DL (ref 70–99)
GLUCOSE BLDC GLUCOMTR-MCNC: 198 MG/DL (ref 70–99)
GLUCOSE BLDC GLUCOMTR-MCNC: 216 MG/DL (ref 70–99)
GLUCOSE SERPL-MCNC: 175 MG/DL (ref 70–99)
HCT VFR BLD AUTO: 40.3 % (ref 35–47)
HGB BLD-MCNC: 12.7 G/DL (ref 11.7–15.7)
MAGNESIUM SERPL-MCNC: 2 MG/DL (ref 1.7–2.3)
MCH RBC QN AUTO: 28.9 PG (ref 26.5–33)
MCHC RBC AUTO-ENTMCNC: 31.5 G/DL (ref 31.5–36.5)
MCV RBC AUTO: 92 FL (ref 78–100)
PHOSPHATE SERPL-MCNC: 3.1 MG/DL (ref 2.5–4.5)
PLATELET # BLD AUTO: 236 10E3/UL (ref 150–450)
POTASSIUM SERPL-SCNC: 4.4 MMOL/L (ref 3.4–5.3)
RADIOLOGIST FLAGS: ABNORMAL
RBC # BLD AUTO: 4.39 10E6/UL (ref 3.8–5.2)
SODIUM SERPL-SCNC: 140 MMOL/L (ref 135–145)
WBC # BLD AUTO: 8 10E3/UL (ref 4–11)

## 2023-11-09 PROCEDURE — 83735 ASSAY OF MAGNESIUM: CPT

## 2023-11-09 PROCEDURE — 250N000009 HC RX 250: Performed by: NURSE PRACTITIONER

## 2023-11-09 PROCEDURE — 70450 CT HEAD/BRAIN W/O DYE: CPT

## 2023-11-09 PROCEDURE — 250N000013 HC RX MED GY IP 250 OP 250 PS 637

## 2023-11-09 PROCEDURE — 85027 COMPLETE CBC AUTOMATED: CPT

## 2023-11-09 PROCEDURE — 250N000011 HC RX IP 250 OP 636: Mod: JZ | Performed by: NURSE PRACTITIONER

## 2023-11-09 PROCEDURE — 84100 ASSAY OF PHOSPHORUS: CPT

## 2023-11-09 PROCEDURE — 250N000013 HC RX MED GY IP 250 OP 250 PS 637: Performed by: NURSE PRACTITIONER

## 2023-11-09 PROCEDURE — 80048 BASIC METABOLIC PNL TOTAL CA: CPT

## 2023-11-09 PROCEDURE — 36415 COLL VENOUS BLD VENIPUNCTURE: CPT

## 2023-11-09 PROCEDURE — 120N000002 HC R&B MED SURG/OB UMMC

## 2023-11-09 PROCEDURE — 999N000127 HC STATISTIC PERIPHERAL IV START W US GUIDANCE

## 2023-11-09 PROCEDURE — 70450 CT HEAD/BRAIN W/O DYE: CPT | Mod: 26 | Performed by: RADIOLOGY

## 2023-11-09 RX ORDER — OXYCODONE HYDROCHLORIDE 5 MG/1
5-10 TABLET ORAL
Status: DISCONTINUED | OUTPATIENT
Start: 2023-11-09 | End: 2023-11-11 | Stop reason: HOSPADM

## 2023-11-09 RX ORDER — HEPARIN SODIUM 5000 [USP'U]/.5ML
5000 INJECTION, SOLUTION INTRAVENOUS; SUBCUTANEOUS EVERY 8 HOURS
Status: DISCONTINUED | OUTPATIENT
Start: 2023-11-09 | End: 2023-11-09

## 2023-11-09 RX ORDER — ONDANSETRON 4 MG/1
4-8 TABLET, ORALLY DISINTEGRATING ORAL EVERY 6 HOURS PRN
Status: DISCONTINUED | OUTPATIENT
Start: 2023-11-09 | End: 2023-11-11 | Stop reason: HOSPADM

## 2023-11-09 RX ORDER — SCOLOPAMINE TRANSDERMAL SYSTEM 1 MG/1
1 PATCH, EXTENDED RELEASE TRANSDERMAL
Status: DISCONTINUED | OUTPATIENT
Start: 2023-11-09 | End: 2023-11-11 | Stop reason: HOSPADM

## 2023-11-09 RX ORDER — ONDANSETRON 2 MG/ML
4-8 INJECTION INTRAMUSCULAR; INTRAVENOUS EVERY 6 HOURS PRN
Status: DISCONTINUED | OUTPATIENT
Start: 2023-11-09 | End: 2023-11-11 | Stop reason: HOSPADM

## 2023-11-09 RX ORDER — ONDANSETRON 2 MG/ML
4 INJECTION INTRAMUSCULAR; INTRAVENOUS EVERY 6 HOURS
Qty: 8 ML | Refills: 0 | Status: DISCONTINUED | OUTPATIENT
Start: 2023-11-09 | End: 2023-11-10

## 2023-11-09 RX ORDER — HEPARIN SODIUM 5000 [USP'U]/.5ML
5000 INJECTION, SOLUTION INTRAVENOUS; SUBCUTANEOUS EVERY 8 HOURS
Status: DISCONTINUED | OUTPATIENT
Start: 2023-11-09 | End: 2023-11-11 | Stop reason: HOSPADM

## 2023-11-09 RX ADMIN — OXYCODONE HYDROCHLORIDE 10 MG: 5 TABLET ORAL at 04:27

## 2023-11-09 RX ADMIN — LEVETIRACETAM 500 MG: 500 TABLET, FILM COATED ORAL at 07:57

## 2023-11-09 RX ADMIN — OFLOXACIN 5 DROP: 3 SOLUTION AURICULAR (OTIC) at 21:15

## 2023-11-09 RX ADMIN — OXYCODONE HYDROCHLORIDE 10 MG: 5 TABLET ORAL at 14:40

## 2023-11-09 RX ADMIN — OFLOXACIN 5 DROP: 3 SOLUTION AURICULAR (OTIC) at 07:57

## 2023-11-09 RX ADMIN — ACETAMINOPHEN 975 MG: 325 TABLET, FILM COATED ORAL at 07:56

## 2023-11-09 RX ADMIN — HEPARIN SODIUM 5000 UNITS: 5000 INJECTION, SOLUTION INTRAVENOUS; SUBCUTANEOUS at 21:15

## 2023-11-09 RX ADMIN — PROCHLORPERAZINE EDISYLATE 5 MG: 5 INJECTION INTRAMUSCULAR; INTRAVENOUS at 20:41

## 2023-11-09 RX ADMIN — LEVOTHYROXINE SODIUM 125 MCG: 125 TABLET ORAL at 07:57

## 2023-11-09 RX ADMIN — ONDANSETRON 4 MG: 2 INJECTION INTRAMUSCULAR; INTRAVENOUS at 10:59

## 2023-11-09 RX ADMIN — INSULIN ASPART 2 UNITS: 100 INJECTION, SOLUTION INTRAVENOUS; SUBCUTANEOUS at 19:41

## 2023-11-09 RX ADMIN — LISINOPRIL 20 MG: 20 TABLET ORAL at 07:57

## 2023-11-09 RX ADMIN — SENNOSIDES AND DOCUSATE SODIUM 1 TABLET: 8.6; 5 TABLET ORAL at 19:43

## 2023-11-09 RX ADMIN — SCOPALAMINE 1 PATCH: 1 PATCH, EXTENDED RELEASE TRANSDERMAL at 15:28

## 2023-11-09 RX ADMIN — OXYCODONE HYDROCHLORIDE 5 MG: 5 TABLET ORAL at 18:02

## 2023-11-09 RX ADMIN — ACETAMINOPHEN 650 MG: 325 TABLET, FILM COATED ORAL at 14:40

## 2023-11-09 RX ADMIN — INSULIN ASPART 1 UNITS: 100 INJECTION, SOLUTION INTRAVENOUS; SUBCUTANEOUS at 07:57

## 2023-11-09 RX ADMIN — ATORVASTATIN CALCIUM 10 MG: 10 TABLET, FILM COATED ORAL at 07:57

## 2023-11-09 RX ADMIN — SENNOSIDES AND DOCUSATE SODIUM 1 TABLET: 8.6; 5 TABLET ORAL at 07:57

## 2023-11-09 RX ADMIN — LEVETIRACETAM 500 MG: 500 TABLET, FILM COATED ORAL at 19:43

## 2023-11-09 RX ADMIN — ONDANSETRON 4 MG: 2 INJECTION INTRAMUSCULAR; INTRAVENOUS at 17:21

## 2023-11-09 RX ADMIN — POLYETHYLENE GLYCOL 3350 17 G: 17 POWDER, FOR SOLUTION ORAL at 07:58

## 2023-11-09 RX ADMIN — ACETAMINOPHEN 650 MG: 325 TABLET, FILM COATED ORAL at 21:20

## 2023-11-09 ASSESSMENT — ACTIVITIES OF DAILY LIVING (ADL)
ADLS_ACUITY_SCORE: 27
ADLS_ACUITY_SCORE: 27
ADLS_ACUITY_SCORE: 24
ADLS_ACUITY_SCORE: 27
ADLS_ACUITY_SCORE: 24
ADLS_ACUITY_SCORE: 27

## 2023-11-09 NOTE — PLAN OF CARE
Goal Outcome Evaluation:      Plan of Care Reviewed With: patient    Overall Patient Progress: improvingOverall Patient Progress: improving    Status: POD # 3 R middle fossa approach for repair of encephalocele and CSF leak and placement of lumbar drain  Vitals: VSS on RA  Neuros: A&Ox4, strength 5/5 t/o, denied numbness and tingling, R hearing diminished, On Nasal Precautions  IV: L PIV SL  Labs/Electrolytes: Phos[hours redraw done this AM, BG Checks ACHS  Resp/trach: LSC on RA  Diet: Regular  Bowel status: BS+, LBM 11/5  : Voids spontaneously  Skin: R head/ ear incision BRIDGET, Lumbar drain removed on previous shift. Scant serosanguinous drainage observed on pillow from R side ear. Drainage stopped-- MD aware.    Pain: HA managed with scheduled tylenol, PRN 10mg oxy x1, some cold washcloths applied on forehead  Activity: SBA/ GB  Social: no visitors   Plan: possible discharge this morning  Updates this shift: pain management done this shift. slight episodes of dizziness reported upon waking up to the bathroom, dizziness resolved when patient was back in bed. Patient slept between cares, otherwise no new concerns--vitally stable. Continue to monitor.

## 2023-11-09 NOTE — PROVIDER NOTIFICATION
Dr. Champion paged regarding patients increased pain and nausea. Pt unable to get to bathroom d/t symptoms and is using bedpan. Family at bedside also has questions for provider.

## 2023-11-09 NOTE — TELEPHONE ENCOUNTER
[facility] Call Center    Phone Message    May a detailed message be left on voicemail: yes     Reason for Call: You should follow up in Neurosurgery and ENT clinics for wound check and general post-operative care in about 2 weeks     Action Taken: Other: Routed to [facility] Neurosurgery Adult [facility]    Travel Screening: Not Applicable

## 2023-11-09 NOTE — PROGRESS NOTES
Neurosurgery Brief Progress Note    LD drain removal    Drain not deemed to be necessary. Drain site was prepped in usual sterile fashion with chloraprep. The drain was taken off suction. The sutures securing the drain were cut and the site was re-prepped. The drain was removed with tip intact. A single figure of 8 stitch with 3-0 monocryl was placed with adequate hemostasis. No immediate complication was observed and the patient tolerated the procedure well.     Melvin Huntley MD  Neurosurgery Resident  Pager [phone]    Please contact neurosurgery resident on call with questions.    Dial * * [phone], enter [phone] when prompted.

## 2023-11-09 NOTE — PLAN OF CARE
Status: POD #2 R middle fossa approach for repair of encephalocele and CSF leak and placement of lumbar drain   Vitals: VSS  Neuros: A&Ox4, strengths 5/5 throughout, denies N/T, R hearing diminished, nasal precautions   IV: PIV SL   Labs/Electrolytes: Phosphorus replaced this shift, redraw in AM. BG Checks ACHS   Resp/trach: LSC on RA  Diet: Regular diet, tolerating well   Bowel status: LBM 11/5, BS+   : Voiding spontaneously   Skin: R head/ear incision BRIDGET. Lumbar drain removed this shift.   Pain: Headache managed with tylenol and oxycodone.   Activity: Bedrest   Social: Family visited this shift, supportive   Plan/Updates this shift: Lumbar drain removed at 9:45pm. Possible discharge tomorrow.

## 2023-11-09 NOTE — PLAN OF CARE
Status: s/p 11/6 R middle fossa approach for repair of encephalocele and CSF leak and placement of lumbar drain  Vitals: VSS on RA  Neuros: A&Ox4, strength 5/5 t/o, denied numbness and tingling, R hearing diminished, On Nasal Precautions  IV: L PIV SL  Labs/Electrolytes: BG Checks ACHS  Resp/trach: LSC on RA  Diet: Regular  Bowel status: BS+, LBM 11/5  : Voids spontaneously  Skin: R head/ ear incision BRIDGET, Lumbar drain removed yesterday. Scant serosanguinous drainage observed from R side ear. Drainage stopped -- MD aware.    Pain: cold washcloths applied on forehead, denied pain  Activity: SBA/ GB  Social: no visitors   Plan: possible discharge tomorrow morning  Updates this shift: patient activity and HOB limited by nausea, zofran effective for a brief period of time. Declined all offered walks. Continue to monitor.

## 2023-11-09 NOTE — PROVIDER NOTIFICATION
"  MD Huntley notified at [id number] re:    \"Yousif rm [id number] on 6A: FYI small amt of serosang drainage noted  from MELQUIADES paredes. Jasmyne [phone]\"  "

## 2023-11-09 NOTE — PROVIDER NOTIFICATION
Notified provider that patient was experiencing nausea reportedly not successfully treated with anti-emetics when HOB >30.

## 2023-11-09 NOTE — PROGRESS NOTES
"Otolaryngology Progress Note     SUBJECTIVE: No acute events overnight. Minimal intermittent drainage from ear, some overnight on pillow around 2am. Denies salty/metallic taste, denies dizziness changes.      OBJECTIVE:   /42 (BP Location: Right arm)   Pulse 73   Temp 98.6  F (37  C) (Oral)   Resp 16   Ht 1.626 m (5' 4\")   Wt 69.3 kg (152 lb 12.5 oz)   SpO2 95%   BMI 26.22 kg/m     General: Alert and oriented x 3, No acute distress   HEENT: right incision covered with open to air. Some dried blood in ear canal. No drainage from right ear with head turned to the right.   Pulmonary: Breathing non-labored, no stridor, no accessory muscle use.    LABS:  ROUTINE IP LABS (Last four results)  BMP  Recent Labs   Lab 11/09/23  0528 11/08/23  2155 11/08/23  1156 11/08/23  0818 11/08/23  0723 11/07/23  0602 11/07/23  0415     --   --   --  141  --  143   POTASSIUM 4.4  --   --   --  5.1  --  3.8   CHLORIDE 105  --   --   --  107  --  110*   JAYCOB 9.8  --   --   --  9.9  --  9.9   CO2 26  --   --   --  26  --  24   BUN 8.7  --   --   --  9.7  --  10.4   CR 0.71  --   --   --  0.62  --  0.50*   * 140* 171* 161* 172*   < > 166*    < > = values in this interval not displayed.     CBC  Recent Labs   Lab 11/09/23  0528 11/08/23  0723 11/07/23  0415   WBC 8.0 8.8 12.5*   RBC 4.39 4.26 4.11   HGB 12.7 12.4 11.8   HCT 40.3 38.6 36.0   MCV 92 91 88   MCH 28.9 29.1 28.7   MCHC 31.5 32.1 32.8   RDW 13.1 13.2 13.0    222 233     INRNo lab results found in last 7 days.    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   - lumbar drain per NSGY   - Ofloxacin drops BID to right ear  - Sinus Precautions:                   - Do not use a straw                  - Do not blow your nose                  - Do not strain (take stool softeners)                "   - Open your mouth when you sneeze                  - No lifting greater than 20 lbs                  - Try not to lean forward and allow blood to build up in your head.    - Remainder of cares per primary team     -- Patient and above plan discussed with Dr Logan Rios MD  Otolaryngology Head and Neck Surgery Resident, PGY-2

## 2023-11-09 NOTE — PROGRESS NOTES
"[facility], [place]     Neurosurgery Progress Note:  11/09/2023      Interval History: Lumbar drain removed last night. Patient denies headache, vision changes or dizziness.  Denies salty taste. No signs of active leaking from ear.  Will plan to watch for drainage closely this AM. Incision clean and dry.     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     She is POD 2 from Right middle fossa approach for repair of encephalocele and CSF leak and placement of lumbar drain    Clinically Significant Risk Factors                  # Hypertension: Noted on problem list       # DMII: A1C = 6.6 % (Ref range: 0.0 - 5.6 %) within past 6 months, PRESENT ON ADMISSION  # Obesity: Estimated body mass index is 31.25 kg/m  as calculated from the following:    Height as of this encounter: 1.702 m (5' 7\").    Weight as of this encounter: 90.5 kg (199 lb 8.3 oz)., PRESENT ON ADMISSION          Plan:  Serial neuro exams  Pain control  HOB > 30 degrees  SBP <140  Resume PTA diltiazem and lisinopril  Keppra 7days  Continue to hold ASA  Advance diet as tolerated  Bowel regimen  PRN antiemetics  PT/OT  SCDs for DVT proph    -----------------------------------  ALINA Sanches, CNP  Neurosurgery  Pager [phone]  --------    Gen: Appears comfortable, NAD  Wound: clean, dry, intact  Neurologic:  Alert & Oriented to person, place, time, and situation  Follows commands briskly  Speech fluent, spontaneous. No aphasia or dysarthria.  No gaze preference. No apparent hemineglect.  PERRL, EOMI, Dysconjugate gaze- baseline  Face symmetric with sensation intact to light touch  Right hearing loss  Palate elevates symmetrically, uvula midline, tongue protrudes midline  Trapezii muscles 5/5 bilaterally  No pronator drift     Del Tr Bi WE WF Gr   R 5 5 5 5 5 5   L 5 5 5 5 5 5    HF KE KF DF PF EHL   R 5 5 5 5 5 5   L 5 5 5 5 5 5     Reflexes " 2+ throughout    Sensation intact and symmetric to light touch throughout    Objective:   Temp:  [97.4  F (36.3  C)-98.7  F (37.1  C)] 97.4  F (36.3  C)  Pulse:  [51-59] 52  Resp:  [16] 16  BP: (112-144)/(72-85) 129/81  SpO2:  [92 %-94 %] 93 %  I/O last 3 completed shifts:  In: -   Out: 40 [Drains:40]        LABS:  Recent Labs   Lab 11/09/23 0737 11/09/23 0528 11/08/23  2155 11/08/23  0818 11/08/23  0723 11/07/23  0602 11/07/23  0415   NA  --  140  --   --  141  --  143   POTASSIUM  --  4.4  --   --  5.1  --  3.8   CHLORIDE  --  105  --   --  107  --  110*   CO2  --  26  --   --  26  --  24   ANIONGAP  --  9  --   --  8  --  9   * 175* 140*   < > 172*   < > 166*   BUN  --  8.7  --   --  9.7  --  10.4   CR  --  0.71  --   --  0.62  --  0.50*   JAYCOB  --  9.8  --   --  9.9  --  9.9    < > = values in this interval not displayed.       Recent Labs   Lab 11/09/23 0528   WBC 8.0   RBC 4.39   HGB 12.7   HCT 40.3   MCV 92   MCH 28.9   MCHC 31.5   RDW 13.1          IMAGING:  No results found for this or any previous visit (from the past 24 hour(s)).

## 2023-11-10 ENCOUNTER — ANESTHESIA EVENT (OUTPATIENT)
Dept: SURGERY | Facility: CLINIC | Age: 63
DRG: 025 | End: 2023-11-10
Payer: COMMERCIAL

## 2023-11-10 ENCOUNTER — APPOINTMENT (OUTPATIENT)
Dept: OCCUPATIONAL THERAPY | Facility: CLINIC | Age: 63
DRG: 025 | End: 2023-11-10
Attending: NEUROLOGICAL SURGERY
Payer: COMMERCIAL

## 2023-11-10 ENCOUNTER — ANESTHESIA (OUTPATIENT)
Dept: SURGERY | Facility: CLINIC | Age: 63
DRG: 025 | End: 2023-11-10
Payer: COMMERCIAL

## 2023-11-10 ENCOUNTER — APPOINTMENT (OUTPATIENT)
Dept: CT IMAGING | Facility: CLINIC | Age: 63
DRG: 025 | End: 2023-11-10
Payer: COMMERCIAL

## 2023-11-10 LAB
ANION GAP SERPL CALCULATED.3IONS-SCNC: 9 MMOL/L (ref 7–15)
ATRIAL RATE - MUSE: 42 BPM
BUN SERPL-MCNC: 9.4 MG/DL (ref 8–23)
CALCIUM SERPL-MCNC: 10.6 MG/DL (ref 8.8–10.2)
CHLORIDE SERPL-SCNC: 105 MMOL/L (ref 98–107)
CREAT SERPL-MCNC: 0.64 MG/DL (ref 0.51–0.95)
DEPRECATED HCO3 PLAS-SCNC: 25 MMOL/L (ref 22–29)
DIASTOLIC BLOOD PRESSURE - MUSE: NORMAL MMHG
EGFRCR SERPLBLD CKD-EPI 2021: >90 ML/MIN/1.73M2
ERYTHROCYTE [DISTWIDTH] IN BLOOD BY AUTOMATED COUNT: 13.1 % (ref 10–15)
GLUCOSE BLDC GLUCOMTR-MCNC: 125 MG/DL (ref 70–99)
GLUCOSE BLDC GLUCOMTR-MCNC: 138 MG/DL (ref 70–99)
GLUCOSE BLDC GLUCOMTR-MCNC: 142 MG/DL (ref 70–99)
GLUCOSE BLDC GLUCOMTR-MCNC: 167 MG/DL (ref 70–99)
GLUCOSE BLDC GLUCOMTR-MCNC: 173 MG/DL (ref 70–99)
GLUCOSE SERPL-MCNC: 185 MG/DL (ref 70–99)
HCT VFR BLD AUTO: 41.7 % (ref 35–47)
HGB BLD-MCNC: 13.4 G/DL (ref 11.7–15.7)
INTERPRETATION ECG - MUSE: NORMAL
MAGNESIUM SERPL-MCNC: 2.1 MG/DL (ref 1.7–2.3)
MCH RBC QN AUTO: 28.6 PG (ref 26.5–33)
MCHC RBC AUTO-ENTMCNC: 32.1 G/DL (ref 31.5–36.5)
MCV RBC AUTO: 89 FL (ref 78–100)
P AXIS - MUSE: 31 DEGREES
PHOSPHATE SERPL-MCNC: 2.9 MG/DL (ref 2.5–4.5)
PLATELET # BLD AUTO: 282 10E3/UL (ref 150–450)
POTASSIUM SERPL-SCNC: 5 MMOL/L (ref 3.4–5.3)
PR INTERVAL - MUSE: 170 MS
QRS DURATION - MUSE: 114 MS
QT - MUSE: 442 MS
QTC - MUSE: 369 MS
R AXIS - MUSE: -56 DEGREES
RBC # BLD AUTO: 4.68 10E6/UL (ref 3.8–5.2)
SODIUM SERPL-SCNC: 139 MMOL/L (ref 135–145)
SYSTOLIC BLOOD PRESSURE - MUSE: NORMAL MMHG
T AXIS - MUSE: -11 DEGREES
T4 FREE SERPL-MCNC: 2 NG/DL (ref 0.9–1.7)
TSH SERPL DL<=0.005 MIU/L-ACNC: 0.16 UIU/ML (ref 0.3–4.2)
VENTRICULAR RATE- MUSE: 42 BPM
WBC # BLD AUTO: 8.1 10E3/UL (ref 4–11)

## 2023-11-10 PROCEDURE — 84100 ASSAY OF PHOSPHORUS: CPT

## 2023-11-10 PROCEDURE — 97110 THERAPEUTIC EXERCISES: CPT | Mod: GO | Performed by: OCCUPATIONAL THERAPIST

## 2023-11-10 PROCEDURE — 36415 COLL VENOUS BLD VENIPUNCTURE: CPT

## 2023-11-10 PROCEDURE — 93010 ELECTROCARDIOGRAM REPORT: CPT | Performed by: INTERNAL MEDICINE

## 2023-11-10 PROCEDURE — 120N000002 HC R&B MED SURG/OB UMMC

## 2023-11-10 PROCEDURE — 999N000141 HC STATISTIC PRE-PROCEDURE NURSING ASSESSMENT

## 2023-11-10 PROCEDURE — 250N000011 HC RX IP 250 OP 636: Performed by: STUDENT IN AN ORGANIZED HEALTH CARE EDUCATION/TRAINING PROGRAM

## 2023-11-10 PROCEDURE — 93010 ELECTROCARDIOGRAM REPORT: CPT | Mod: XP | Performed by: INTERNAL MEDICINE

## 2023-11-10 PROCEDURE — 70450 CT HEAD/BRAIN W/O DYE: CPT | Mod: 26 | Performed by: RADIOLOGY

## 2023-11-10 PROCEDURE — 85027 COMPLETE CBC AUTOMATED: CPT

## 2023-11-10 PROCEDURE — 250N000013 HC RX MED GY IP 250 OP 250 PS 637

## 2023-11-10 PROCEDURE — 83735 ASSAY OF MAGNESIUM: CPT

## 2023-11-10 PROCEDURE — 250N000011 HC RX IP 250 OP 636: Mod: JZ

## 2023-11-10 PROCEDURE — 250N000013 HC RX MED GY IP 250 OP 250 PS 637: Performed by: NURSE PRACTITIONER

## 2023-11-10 PROCEDURE — 99253 IP/OBS CNSLTJ NEW/EST LOW 45: CPT | Mod: GC | Performed by: ANESTHESIOLOGY

## 2023-11-10 PROCEDURE — 370N000017 HC ANESTHESIA TECHNICAL FEE, PER MIN

## 2023-11-10 PROCEDURE — 258N000003 HC RX IP 258 OP 636: Performed by: NURSE PRACTITIONER

## 2023-11-10 PROCEDURE — 80048 BASIC METABOLIC PNL TOTAL CA: CPT

## 2023-11-10 PROCEDURE — 70450 CT HEAD/BRAIN W/O DYE: CPT

## 2023-11-10 PROCEDURE — 84443 ASSAY THYROID STIM HORMONE: CPT | Performed by: NURSE PRACTITIONER

## 2023-11-10 PROCEDURE — 93005 ELECTROCARDIOGRAM TRACING: CPT

## 2023-11-10 PROCEDURE — 84439 ASSAY OF FREE THYROXINE: CPT | Performed by: NURSE PRACTITIONER

## 2023-11-10 PROCEDURE — 250N000011 HC RX IP 250 OP 636: Performed by: NURSE PRACTITIONER

## 2023-11-10 RX ORDER — NALOXONE HYDROCHLORIDE 0.4 MG/ML
0.2 INJECTION, SOLUTION INTRAMUSCULAR; INTRAVENOUS; SUBCUTANEOUS
Status: DISCONTINUED | OUTPATIENT
Start: 2023-11-10 | End: 2023-11-10 | Stop reason: HOSPADM

## 2023-11-10 RX ORDER — NALOXONE HYDROCHLORIDE 0.4 MG/ML
0.4 INJECTION, SOLUTION INTRAMUSCULAR; INTRAVENOUS; SUBCUTANEOUS
Status: DISCONTINUED | OUTPATIENT
Start: 2023-11-10 | End: 2023-11-10 | Stop reason: HOSPADM

## 2023-11-10 RX ORDER — FENTANYL CITRATE 50 UG/ML
25-50 INJECTION, SOLUTION INTRAMUSCULAR; INTRAVENOUS
Status: DISCONTINUED | OUTPATIENT
Start: 2023-11-10 | End: 2023-11-10 | Stop reason: HOSPADM

## 2023-11-10 RX ORDER — SODIUM CHLORIDE 9 MG/ML
INJECTION, SOLUTION INTRAVENOUS CONTINUOUS
Status: DISCONTINUED | OUTPATIENT
Start: 2023-11-10 | End: 2023-11-11 | Stop reason: HOSPADM

## 2023-11-10 RX ORDER — FLUMAZENIL 0.1 MG/ML
0.2 INJECTION, SOLUTION INTRAVENOUS
Status: DISCONTINUED | OUTPATIENT
Start: 2023-11-10 | End: 2023-11-10 | Stop reason: HOSPADM

## 2023-11-10 RX ADMIN — HYDRALAZINE HYDROCHLORIDE 20 MG: 20 INJECTION INTRAMUSCULAR; INTRAVENOUS at 03:51

## 2023-11-10 RX ADMIN — SODIUM CHLORIDE 500 ML: 9 INJECTION, SOLUTION INTRAVENOUS at 09:02

## 2023-11-10 RX ADMIN — LEVETIRACETAM 500 MG: 500 TABLET, FILM COATED ORAL at 09:05

## 2023-11-10 RX ADMIN — FENTANYL CITRATE 50 MCG: 50 INJECTION, SOLUTION INTRAMUSCULAR; INTRAVENOUS at 14:12

## 2023-11-10 RX ADMIN — LEVOTHYROXINE SODIUM 125 MCG: 125 TABLET ORAL at 09:04

## 2023-11-10 RX ADMIN — ONDANSETRON 4 MG: 2 INJECTION INTRAMUSCULAR; INTRAVENOUS at 05:07

## 2023-11-10 RX ADMIN — ACETAMINOPHEN 650 MG: 325 TABLET, FILM COATED ORAL at 16:10

## 2023-11-10 RX ADMIN — OFLOXACIN 5 DROP: 3 SOLUTION AURICULAR (OTIC) at 09:05

## 2023-11-10 RX ADMIN — PROCHLORPERAZINE MALEATE 5 MG: 5 TABLET ORAL at 09:04

## 2023-11-10 RX ADMIN — LEVETIRACETAM 500 MG: 500 TABLET, FILM COATED ORAL at 20:26

## 2023-11-10 RX ADMIN — OXYCODONE HYDROCHLORIDE 5 MG: 5 TABLET ORAL at 05:04

## 2023-11-10 RX ADMIN — HEPARIN SODIUM 5000 UNITS: 5000 INJECTION, SOLUTION INTRAVENOUS; SUBCUTANEOUS at 03:58

## 2023-11-10 RX ADMIN — OXYCODONE HYDROCHLORIDE 5 MG: 5 TABLET ORAL at 00:09

## 2023-11-10 RX ADMIN — SENNOSIDES AND DOCUSATE SODIUM 1 TABLET: 8.6; 5 TABLET ORAL at 09:04

## 2023-11-10 RX ADMIN — ATORVASTATIN CALCIUM 10 MG: 10 TABLET, FILM COATED ORAL at 09:04

## 2023-11-10 RX ADMIN — SODIUM CHLORIDE: 9 INJECTION, SOLUTION INTRAVENOUS at 11:08

## 2023-11-10 RX ADMIN — PROCHLORPERAZINE EDISYLATE 5 MG: 5 INJECTION INTRAMUSCULAR; INTRAVENOUS at 16:10

## 2023-11-10 RX ADMIN — OXYCODONE HYDROCHLORIDE 5 MG: 5 TABLET ORAL at 09:05

## 2023-11-10 RX ADMIN — SENNOSIDES AND DOCUSATE SODIUM 1 TABLET: 8.6; 5 TABLET ORAL at 20:26

## 2023-11-10 ASSESSMENT — ACTIVITIES OF DAILY LIVING (ADL)
ADLS_ACUITY_SCORE: 24

## 2023-11-10 ASSESSMENT — LIFESTYLE VARIABLES: TOBACCO_USE: 1

## 2023-11-10 NOTE — ANESTHESIA CARE TRANSFER NOTE
Patient: Wendy Dodd    Procedure: Procedure(s):  Regional block       Diagnosis: Postural headache [R51.0]  Diagnosis Additional Information: No value filed.    Anesthesia Type:   Other (see Comments)     Note:    Oropharynx: oropharynx clear of all foreign objects  Level of Consciousness: awake  Oxygen Supplementation: room air    Independent Airway: airway patency satisfactory and stable  Dentition: dentition unchanged      Destination: pre operative area.          Vitals:  Vitals Value Taken Time   /75 11/10/23 1410   Temp     Pulse 61 11/10/23 1412   Resp 23 11/10/23 1412   SpO2 97 % 11/10/23 1412   Vitals shown include unfiled device data.    Electronically Signed By: Marlon Downing MD  November 10, 2023  2:13 PM

## 2023-11-10 NOTE — OP NOTE
PATIENT NAME: Wendy Dodd   PATIENT MRN: [id number]   PATIENT ACCOUNT: [id number]  PATIENT YOB: 1960     NEUROSURGERY OPERATIVE REPORT     DATE OF SURGERY: 11/06/23      PREOPERATIVE DIAGNOSIS:  1. 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 Same     PROCEDURES PERFORMED:  1. Right temporal craniotomy  2. Right middle fossa approach for encephalocele repair with vascularized temporalis muscle flap  3. Use of intraoperative microscope  4. Placement of lumbar drain     STAFF SURGEON:   Jennifer Gusman MD      RESIDENT SURGEONS:   Romaine Storey MD (Neurosurgery)     ANESTHESIA: General endotracheal anesthesia     ESTIMATED BLOOD LOSS: 100 mL     IMPLANTS:  Synthes cranial plate chrissy hole cover and 3 mm titanium screws    DRAINS:   Lumbar drain     FINDINGS:    Tegmen tympani and mastoideum defect with encephalocele, repaired intraoperatively with sutures and temporalis muscle/fascia flap.  Skin closure with simple running 3-0 nylon suture.     COMPLICATIONS: None immediate.     INDICATIONS:   Wendy Dodd is a 62 year old woman with history of stroke, hypertension, diabetes, and sleep apnea who presented to clinic with complaint of several years of leakage of clear fluid from her right ear.  She described fluid leakage especially at night, confirmed positive for beta-2-transferrin. She underwent CT and MRI demonstrating right middle fossa encephalocele and dehiscent tegmen tympani/mastoideum.  The patient presents for the above stated procedures to reduce the risk of developing a CNS infection.     DESCRIPTION OF PROCEDURE:  The patient was brought to the operating room and intubated by Anesthesiology. Upon induction of general anesthesia, the bed was turned 180 degrees, and the patient was positioned laterally with the right side up.  Lumbar drain was placed as follows: The patient's lower back was prepared and draped in sterile fashion.  A  time-out was conducted.  A lower lumbar interspinous space was localized in the midline of the back between the iliac crests.  A 14g Tuohy needle was inserted through the midline and advanced until there was brisk return of CSF. The needle was then turned 90 degrees and directed cranially, and a lumbar drain catheter was passed over a wire, without resistance. The Tuohy needle and wire were removed. The lumbar drain catheter was then connected to a primed Hobbs drain, and all connection points were secured with silk ties.  The lumbar drain catheter was secured at its exit site from the skin with 3-0 nylon purse string stitch and staples to relieve pull tension.  The catheter was then secured on the patient's back with Ioban and foam tape.    The patient's positioning was then finalized. She was kept in the lateral position with the right side up. Dean head clamp was applied. the head was slightly flexed laterally to bring the left ear toward the left shoulder. The dependent arm was supported on an armboard.  All pressure points were padded, and the patient was secured to the bed with foam, bed straps, and tape. A trapdoor incision was planned over the patient's right pinna. This area was shaved, prepared and draped in standard fashion.  Local anesthetic was injected subcutaneously at the site of planned incision.  Time-out was conducted.    Incision was made through the skin with #10 blade.  Monopolar cautery was used to dissect to the depth of the galea, and the cutaneous flap was elevated inferiorly off of the superficial temporalis fascia and held in place with fish hooks.  The temporalis was elevated off of the bone using periosteal elevator, and the inner muscle layer was  from the main belly of the muscle using sharp dissection with #10 blade. This resulted in a vascularized temporalis muscle flap.    Two chrissy holes were then created using 5 mm cutting chrissy just superior to the root of the zygoma  and at the superior-posterior aspect of the planned craniotomy.  The periosteal dura was  from the inner table of the bone using Winkler dental and Penfield #3.  B1 drill with footplate was used to turn a craniotomy along the margins of the chrissy holes; the bone flap was set aside for replacement later in the case.  A small dural defect created by the drill was secured with 4-0 nurolon stitch.  Leksell rongeur was then used to remove squamous temporal bone down to the level of the middle fossa.  Drill with 5 mm cutting chrissy was then used to smooth the bony edges flat with the floor of the middle fossa.      ENT then performed an epidural dissection along the floor of the middle fossa to the tegmen tympani/mastoideum defect; this will be dictated separately.    Neurosurgery was called into the room for repair of the encephalocele and the tegmen defect. The microscope was already in the field. The small defect in the basal temporal dura was repaired with 6-0 Prolene. The dura was very thin. A Synthes titanium bur hole cover was positioned over the tegmen defect. The plate was secured to the middle fossa floor with 3 mm Synthes screws.  The vascularized temporalis muscle/fascial graft was brought into the field and applied over the basal temporal dura and secured in place with a small amount of dural sealant.     Hemostasis was achieved. Thereafter, we closed in layers with interrupted 2-0 vicryl for reapproximating the remaining temporalis muscle, inverted interrupted 2-0 vicryl for galea, and 3-0 running nylon for the skin. The wound was cleaned with wet and dry gauze, and Bacitracin was applied. Sterile dressings were applied. The patient was extubated and returned to the PACU without incident. At the end of the procedure, sponge and needle counts were correct. Estimated blood loss totaled 100 ml.     Dr. Gusman was present for the entire neurosurgical portion and  immediately available for the entire  operation.     Operative note prepared by Romaine Storey MD, Neurosurgery, PGY-5.      ATTESTATION: My signature attests that I was present for the entire neurosurgical portion and immediately available for the entire operation described above. I agree with the above findings.    RONAK BAI MD

## 2023-11-10 NOTE — CONSULTS
Pain Service Consultation Note  [facility]      Patient Name: Wendy Dodd  MRN: [id number]   Age: 62 year old  Sex: female  Date: November 10, 2023                                      Reviewed: No    Referring Provider:  Gurpreet Fuchs APRN CNP  Referring Service:  Neurosurgery  Reason for Consultation: C/f PDPH after lumbar drain removal     Assessment/Recommendations:  62 year old female with a past medical history of right temporal bone encephalocele and CSF leak , now s/p right middle fossa approach for repair leak, and lumbar drain placement, now removed   -C/f PDPH after lumbar drain removal     Plan:   -Discussed risks and benefits for epidural blood patch and patient would like to proceed to help with her ongoing headache suspected to be related to PDPH. Informed patient of risk of PDPH that can recur during procedure, and sometimes the time of relief is variable and may require repeat procedure or further work up for ongoing headache.     Thank you for the opportunity to participate in the care of Wendy Dodd  Pain Service will sign off. After epidural blood patch    Discussed with attending anesthesiologist  Primary Service Contacted with Recommendations? Yes    Marlon Downing MD  11/10/2023      Chief Complaint:  C/f PDPH after lumbar drain removal       History of Present Illness:  62 year old female with a past medical history of right temporal bone encephalocele and CSF leak , now s/p right middle fossa approach for repair leak, and lumbar drain placement, now removed   -C/f PDPH after lumbar drain removal. Patient frontal headache pain is positional (improved lying down, worse sitting up), associated nausea, no additional cranial nerve deficits noted per team or during encounter.    Pain Assessment:   Description of Pain: headache, frontal.   Current Pain: 10/10  Goal: 3/10  Baseline Pain Level: 0/10  Onset:2 days ago.   Relieving/exacerbating factors:  worse sitting up, better lying down   Radiating: no   Localized: frontal headache  Constant: yes        Past Medical History:  Past Medical History:   Diagnosis Date    Cancer (H)     Cerebral infarction (H)     Diabetes (H)     Hypertension     Sleep apnea     Thyroid disease     Type 2 diabetes mellitus without complication, without long-term current use of insulin (H)          Family History:    Family History   Problem Relation Age of Onset    Colon Cancer Father     Other Cancer Father         liver    Other Cancer Mother     Other Cancer Brother     Skin Cancer Sister     Asthma No family hx of     Allergic rhinitis No family hx of        Social History:  Social History     Tobacco Use    Smoking status: Former     Packs/day: 0.00     Years: 0.00     Additional pack years: 0.00     Total pack years: 0.00     Types: Cigarettes, Other     Start date: 1975     Quit date: 2015     Years since quittin.8     Passive exposure: Past    Smokeless tobacco: Never    Tobacco comments:     I use Ecig   Substance Use Topics    Alcohol use: Yes             Review of Systems:  Complete ROS reviewed. Unless otherwise noted, all other systems found to be negative.        Laboratory Results:  Recent Labs   Lab Test 11/10/23  0620      BUN 9.4       Allergies:  Allergies   Allergen Reactions    Penicillins     Sulfa Antibiotics Unknown         Current Pain Related Medications:  Medications related to Pain Management (From now, onward)      Start     Dose/Rate Route Frequency Ordered Stop    23 1745  oxyCODONE (ROXICODONE) tablet 5-10 mg         5-10 mg Oral EVERY 3 HOURS PRN 23 1516      23 0000  acetaminophen (TYLENOL) tablet 650 mg         650 mg Oral EVERY 4 HOURS PRN 23 1533      23 0000  acetaminophen (TYLENOL) 325 MG tablet         975 mg Oral EVERY 6 HOURS PRN 23 1539      23 0000  cyclobenzaprine (FLEXERIL) 10 MG tablet         10 mg Oral EVERY 8 HOURS PRN  "11/08/23 1539      11/08/23 0000  levETIRAcetam (KEPPRA) 500 MG tablet         500 mg Oral 2 TIMES DAILY 11/08/23 1539 11/13/23 2359 11/08/23 0000  oxyCODONE (ROXICODONE) 5 MG tablet         5-10 mg Oral EVERY 4 HOURS PRN 11/08/23 1539      11/08/23 0000  polyethylene glycol (MIRALAX) 17 GM/Dose powder         17 g Oral DAILY 11/08/23 1539      11/08/23 0000  senna-docusate (SENOKOT-S/PERICOLACE) 8.6-50 MG tablet         1 tablet Oral 2 TIMES DAILY 11/08/23 1539 11/22/23 2359 11/07/23 0800  polyethylene glycol (MIRALAX) Packet 17 g         17 g Oral DAILY 11/06/23 1533      11/06/23 2000  senna-docusate (SENOKOT-S/PERICOLACE) 8.6-50 MG per tablet 1 tablet         1 tablet Oral 2 TIMES DAILY 11/06/23 1533      11/06/23 2000  levETIRAcetam (KEPPRA) tablet 500 mg         500 mg Oral 2 TIMES DAILY 11/06/23 1534 11/13/23 1959 11/06/23 1533  lidocaine 1 % 0.1-1 mL         0.1-1 mL Other EVERY 1 HOUR PRN 11/06/23 1533      11/06/23 1533  lidocaine (LMX4) cream          Topical EVERY 1 HOUR PRN 11/06/23 1533      11/06/23 1533  magnesium hydroxide (MILK OF MAGNESIA) suspension 30 mL         30 mL Oral DAILY PRN 11/06/23 1533      11/06/23 1533  bisacodyl (DULCOLAX) suppository 10 mg         10 mg Rectal DAILY PRN 11/06/23 1533      11/06/23 1533  cyclobenzaprine (FLEXERIL) tablet 10 mg         10 mg Oral EVERY 8 HOURS PRN 11/06/23 1533                Physical Exam:  Vitals: /76 (BP Location: Left arm)   Pulse 54   Temp 36.9  C (98.4  F) (Axillary)   Resp 18   Ht 1.702 m (5' 7\")   Wt 90.5 kg (199 lb 8.3 oz)   SpO2 96%   BMI 31.25 kg/m      Physical Exam:   CONSTITUTIONAL/GENERAL APPEARANCE:  NAD  EYES: EOMI, sclera anicteric, PERRLA  ENT/NECK: atraumatic, lips and oral mucous membranes dry  RESPIRATORY: non-labored breathing. No cough, wheeze  CV: RRR  ABDOMEN: Soft, non-tender, non-distended  MUSCULOSKELETAL/BACK/SPINE/EXTREMITIES: Moves all extremities purposefully.  No edema or obvious joint " "deformities   NEURO: Alert and Oriented x3. Answers questions appropriately  SKIN/VASCULAR EXAM:  No jaundice, no visible rashes or lesions      Please see A&P for additional details of medical decision making.      Acute Inpatient Pain Service [facility]  Hours of pain coverage 24/7   Page via [employer]- Please Page the Pain Team Via [facility]: \"PAIN MANAGEMENT ACUTE INPATIENT/ [facility]\"            "

## 2023-11-10 NOTE — PROGRESS NOTES
"[facility], [place]     Neurosurgery Progress Note:  11/10/2023      Interval History: Patient continues to report headaches, which worsen with activity.  Patient reports nausea improved. Denies salty taste. No signs of active leaking from incision. Small amount of serosanguinous drainage from right ear.   Head CT done this AM, reveals slight increase in subdural fluid collections.     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     She is POD 4 from Right middle fossa approach for repair of encephalocele and CSF leak and placement of lumbar drain    Clinically Significant Risk Factors           # Hypercalcemia: Highest Ca = 10.6 mg/dL in last 2 days, will monitor as appropriate        # Hypertension: Noted on problem list       # DMII: A1C = 6.6 % (Ref range: 0.0 - 5.6 %) within past 6 months   # Obesity: Estimated body mass index is 31.25 kg/m  as calculated from the following:    Height as of this encounter: 1.702 m (5' 7\").    Weight as of this encounter: 90.5 kg (199 lb 8.3 oz).        # Unexpected Postoperative SDH's, Extradural Hematoma   # Brain Compression     Plan:  Serial neuro exams  Pain control  HOB > 30 degrees  SBP <140  Keppra 7days  Continue to hold ASA  Bowel regimen  PRN antiemetics and scopolamine patch   PT/OT  SCDs for DVT prophylaxis   IV bolus x 1 this AM  Anesthesia consult for possible blood patch placement.     -----------------------------------  ALINA Sanches, CNP  Neurosurgery  Pager [phone]  --------    Gen: Appears comfortable, NAD  Wound: clean, dry, intact  Neurologic:  Alert & Oriented to person, place, time, and situation  Follows commands briskly  Speech fluent, spontaneous. No aphasia or dysarthria.  No gaze preference. No apparent hemineglect.  PERRL, EOMI, Dysconjugate gaze- baseline  Face symmetric with sensation intact to light touch  Right hearing " loss  Palate elevates symmetrically, uvula midline, tongue protrudes midline  Trapezii muscles 5/5 bilaterally  No pronator drift     Del Tr Bi WE WF Gr   R 5 5 5 5 5 5   L 5 5 5 5 5 5    HF KE KF DF PF EHL   R 5 5 5 5 5 5   L 5 5 5 5 5 5     Reflexes 2+ throughout    Sensation intact and symmetric to light touch throughout    Objective:   Temp:  [97.9  F (36.6  C)-98.7  F (37.1  C)] 98.5  F (36.9  C)  Pulse:  [45-68] 51  Resp:  [16-18] 18  BP: (109-170)/(57-89) 109/66  SpO2:  [92 %-98 %] 95 %  I/O last 3 completed shifts:  In: -   Out: 225 [Urine:225]        LABS:  Recent Labs   Lab 11/10/23  0751 11/10/23  0620 11/10/23  0200 11/09/23  0737 11/09/23  0528 11/08/23  0818 11/08/23  0723   NA  --  139  --   --  140  --  141   POTASSIUM  --  5.0  --   --  4.4  --  5.1   CHLORIDE  --  105  --   --  105  --  107   CO2  --  25  --   --  26  --  26   ANIONGAP  --  9  --   --  9  --  8   * 185* 173*   < > 175*   < > 172*   BUN  --  9.4  --   --  8.7  --  9.7   CR  --  0.64  --   --  0.71  --  0.62   JAYCOB  --  10.6*  --   --  9.8  --  9.9    < > = values in this interval not displayed.       Recent Labs   Lab 11/10/23  0620   WBC 8.1   RBC 4.68   HGB 13.4   HCT 41.7   MCV 89   MCH 28.6   MCHC 32.1   RDW 13.1          IMAGING:  Recent Results (from the past 24 hour(s))   CT Head w/o Contrast   Result Value    Radiologist flags Bilateral subdural hemorrhages (Urgent)    Narrative    CT head without contrast 11/9/2023 4:17 PM    History: Worsening nausea, somnolent status post middle fossa  craniotomy.   ICD-10: Craniotomy    Comparison: Brain MR 8/24/2023.    Technique: Using multidetector thin collimation helical acquisition  technique, axial, coronal and sagittal CT images from the skull base  to the vertex were obtained without intravenous contrast.   (topogram) image(s) also obtained and reviewed.    Findings:   Postsurgical right temporal craniotomy changes for repair of CSF leak.  Hypodensity of the  right temporal lobe adjacent to the fluid  collection beneath craniotomy site. Bilateral subdural hemorrhage  measuring 3 mm in maximal thickness on the right and 4 mm in maximal  thickness on the left. Extradural hemorrhage and gas underneath the  craniotomy site measuring 11 mm in maximal thickness. There is 2 mm of  leftward midline shift. Gray/white matter differentiation in both  cerebral hemispheres is preserved. Ventricles are proportionate to the  cerebral sulci. The basal cisterns are clear.    The bony calvaria and the bones of the skull base are normal. The  visualized portions of the paranasal sinuses and mastoid air cells are  clear.      Impression    Impression:  1.  Postsurgical right temporal craniotomy changes with small  bilateral subdural hemorrhages with trace leftward midline shift. No  hydrocephalus.  2.  Extradural hemorrhage/gas underneath the craniotomy site with  adjacent hypodensity within the right temporal lobe. Differential  would be postoperative edema versus potential infarct. Consider MRI  for further evaluation.    [Urgent Result: Bilateral subdural hemorrhages]    Finding was identified on 11/9/2023 4:15 PM.     Dr. Champion was contacted by Dr. Farrell at 11/9/2023 4:21 PM and  verbalized understanding of the urgent finding.          I have personally reviewed the examination and initial interpretation  and I agree with the findings.    MARGARET GIBSON MD         SYSTEM ID:  [id number]   CT Head w/o Contrast    Narrative    CT HEAD W/O CONTRAST 11/10/2023 5:24 AM    History: Eval stability of the subdural collections     Comparison: CT head 11/9/2023    Technique: Using multidetector thin collimation helical acquisition  technique, axial, coronal and sagittal CT images from the skull base  to the vertex were obtained without intravenous contrast.   (topogram) image(s) also obtained and reviewed.    FINDINGS:   Redemonstrated post surgical changes related to right  temporal  craniotomy, for CSF leak repair.  Stable area of hypodensity within the right temporal lobe, subjacent  to the craniotomy defect.  Bilateral subdural collections again noted along the temporal and  parietal lobes with mild effacement of the underlying cerebral sulci,  measuring up to 3 mm in the right and 4 mm on the left.   Small gas and fluid-containing extradural collection subjacent to the  craniotomy defect, again measuring up to 11 mm. Trace leftward midline  shift, up to 1 mm.    There is no intracranial hemorrhage. Gray/white matter differentiation  in both cerebral hemispheres is preserved. Ventricles are  proportionate to the cerebral sulci. The basal cisterns are clear.  Patchy periventricular hypoattenuation, consistent with chronic small  vessel ischemic disease. Bony defect of the right tegmen tympani, with  overlying screw and plate fixation. Right middle ear and mastoid  effusions. The left mastoid air cells and middle ear are clear. The  paranasal sinuses are clear.      Impression    IMPRESSION:  1.  Stable postsurgical changes related to right temporal craniotomy.  2.  Slightly increased thin bilateral cerebral convexity subdural  collections versus dural thickening and mild effacement of the  underlying sulci.  3.  Unchanged extradural fluid and gas containing collection subjacent  to the right temporal lobe craniotomy defect.  4.  Bony defect at the right tegmen tympani, with right mastoid and  middle ear effusions, unchanged.    I have personally reviewed the examination and initial interpretation  and I agree with the findings.    ALEXANDREA MADRID MD         SYSTEM ID:  [id number]

## 2023-11-10 NOTE — ANESTHESIA POSTPROCEDURE EVALUATION
Patient: Wendy Dodd    Procedure: Procedure(s):  Regional block       Anesthesia Type:  Other (see Comments)    Note:  Disposition: Inpatient   Postop Pain Control: Uneventful            Sign Out: Well controlled pain   PONV: No   Neuro/Psych: Uneventful            Sign Out: Acceptable/Baseline neuro status   Airway/Respiratory: Uneventful            Sign Out: Acceptable/Baseline resp. status   CV/Hemodynamics: Uneventful            Sign Out: Acceptable CV status; No obvious hypovolemia; No obvious fluid overload   Other NRE: NONE   DID A NON-ROUTINE EVENT OCCUR? No           Last vitals:  Vitals Value Taken Time   /75 11/10/23 1410   Temp     Pulse 56 11/10/23 1412   Resp 18 11/10/23 1412   SpO2 96 % 11/10/23 1412   Vitals shown include unfiled device data.    Electronically Signed By: Marlon Downing MD  November 10, 2023  2:14 PM

## 2023-11-10 NOTE — ANESTHESIA PROCEDURE NOTES
Blood Patch:    Staff -        Anesthesiologist:  Savage Saldivar MD       Resident/Fellow: Goldberg, Leslie, MD       Other Anesthesia Staff: Marlon Downing MD       Performed By: resident       Start time: 11/10/2023 1:30 PM       End time: 11/10/2023 2:11 PM  Preanesthetic Checklist:        Completed: patient identified, pre-op evaluation, timeout performed, IV checked, risks and benefits discussed, monitors and equipment checked and anesthesia consent given    Procedure Information:        Location of venous blood draw: arm       Volume of blood injected: 20 mL       Blood collected by assistant using sterile technique.       Patient position: L lateral decubitus       Sterile Barriers: gloves, mask and washed/disinfected hands       Approach: midline       Injection technique: PETTY saline       Location: L4-5       Injection method: Touhy needle       Needle gauge: 17 G       Needle length (cm): 8 cm       Loss of resistance depth: 7 cm    Assessment:       Events: well tolerated       Reason for Blood Patch: spinal headache

## 2023-11-10 NOTE — PROGRESS NOTES
"Otolaryngology Progress Note     SUBJECTIVE: No acute events overnight. Minimal intermittent drainage from ear, stable in amount. New increased nausea that worsens with sitting upright and headaches on the top of her head that worsen with activity. No vertigo. She had some bradycardia and hypertension overnight. Head CT with mild increase in subdural collections.      OBJECTIVE:   /42 (BP Location: Right arm)   Pulse 73   Temp 98.6  F (37  C) (Oral)   Resp 16   Ht 1.626 m (5' 4\")   Wt 69.3 kg (152 lb 12.5 oz)   SpO2 95%   BMI 26.22 kg/m     General: Alert and oriented x 3, No acute distress   HEENT: right incision covered with open to air. No drainage from right ear with head turned to the right.   Pulmonary: Breathing non-labored, no stridor, no accessory muscle use.    LABS:  ROUTINE IP LABS (Last four results)  BMP  Recent Labs   Lab 11/10/23  0751 11/10/23  0620 11/10/23  0200 11/09/23 2203 11/09/23 0737 11/09/23  0528 11/08/23  0818 11/08/23  0723 11/07/23  0602 11/07/23  0415   NA  --  139  --   --   --  140  --  141  --  143   POTASSIUM  --  5.0  --   --   --  4.4  --  5.1  --  3.8   CHLORIDE  --  105  --   --   --  105  --  107  --  110*   JAYCOB  --  10.6*  --   --   --  9.8  --  9.9  --  9.9   CO2  --  25  --   --   --  26  --  26  --  24   BUN  --  9.4  --   --   --  8.7  --  9.7  --  10.4   CR  --  0.64  --   --   --  0.71  --  0.62  --  0.50*   * 185* 173* 178*   < > 175*   < > 172*   < > 166*    < > = values in this interval not displayed.     CBC  Recent Labs   Lab 11/10/23  0620 11/09/23  0528 11/08/23  0723 11/07/23  0415   WBC 8.1 8.0 8.8 12.5*   RBC 4.68 4.39 4.26 4.11   HGB 13.4 12.7 12.4 11.8   HCT 41.7 40.3 38.6 36.0   MCV 89 92 91 88   MCH 28.6 28.9 29.1 28.7   MCHC 32.1 31.5 32.1 32.8   RDW 13.1 13.1 13.2 13.0    236 222 233     INRNo lab results found in last 7 days.    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    - Remainder of cares per primary team     -- Patient and above plan discussed with Dr Logan Xie MD  Otolaryngology Head and Neck Surgery Resident, PGY-4

## 2023-11-10 NOTE — ANESTHESIA PREPROCEDURE EVALUATION
Anesthesia Pre-Procedure Evaluation    Patient: Wendy Dodd   MRN: [id number] : 1960        Procedure : Procedure(s):  Regional block          Past Medical History:   Diagnosis Date    Cancer (H)     Cerebral infarction (H)     Diabetes (H)     Hypertension     Sleep apnea     Thyroid disease     Type 2 diabetes mellitus without complication, without long-term current use of insulin (H)       Past Surgical History:   Procedure Laterality Date    ABDOMEN SURGERY      APPENDECTOMY      BIOPSY      COLONOSCOPY      COLONOSCOPY N/A 2022    Procedure: COLONOSCOPY;  Surgeon: Devin Chawla MD;  Location: [facility]    ENT SURGERY      INSERT DRAIN LUMBAR N/A 2023    Procedure: Insert drain lumbar;  Surgeon: Jennifer Gusman MD;  Location: [facility]    OTS CRANIOTOMY, MIDDLE FOSSA N/A 2023    Procedure: Stealth assisted CRANIOTOMY, MIDDLE FOSSA APPROACH, FOR ENCEPHALOCELE REPAIR, WITH TEMPORALIS MUSCLE FLAP;  Surgeon: Jennifer Gusman MD;  Location: [facility]      Allergies   Allergen Reactions    Penicillins     Sulfa Antibiotics Unknown      Social History     Tobacco Use    Smoking status: Former     Packs/day: 0.00     Years: 0.00     Additional pack years: 0.00     Total pack years: 0.00     Types: Cigarettes, Other     Start date: 1975     Quit date: 2015     Years since quittin.8     Passive exposure: Past    Smokeless tobacco: Never    Tobacco comments:     I use Ecig   Substance Use Topics    Alcohol use: Yes      Wt Readings from Last 1 Encounters:   23 90.5 kg (199 lb 8.3 oz)        Anesthesia Evaluation   Pt has had prior anesthetic. Type: MAC and General.    No history of anesthetic complications       ROS/MED HX  ENT/Pulmonary:     (+) sleep apnea,               tobacco use, Past use,                      Neurologic: Comment: Right CSF leak with encephalocele      Cardiovascular:     (+)  hypertension- -   -  - -                                  Previous cardiac testing   Echo: Date: Results:    Stress Test:  Date: Results:    ECG Reviewed:  Date: 4/7/2021 Results:  Sinus  Rhythm   -Nonspecific QRS widening and anterior fascicular block.   Cath:  Date: Results:      METS/Exercise Tolerance:     Hematologic:  - neg hematologic  ROS     Musculoskeletal:  - neg musculoskeletal ROS     GI/Hepatic: Comment: Diverticulosis of large intestine    External hemorrhoids     H/O colon polyps     (+) GERD, Asymptomatic on medication,                  Renal/Genitourinary:       Endo:     (+)  type II DM, Last HgA1c: 6.6, date: 10/31/23, Not using insulin, - not using insulin pump.  not previously admitted for DM/DKA.  thyroid problem, hypothyroidism,    Obesity,       Psychiatric/Substance Use:  - neg psychiatric ROS     Infectious Disease:  - neg infectious disease ROS     Malignancy:  - neg malignancy ROS     Other:  - neg other ROS          Physical Exam    Airway  airway exam normal           Respiratory Devices and Support         Dental           Cardiovascular   cardiovascular exam normal          Pulmonary                   OUTSIDE LABS:  CBC:   Lab Results   Component Value Date    WBC 8.1 11/10/2023    WBC 8.0 11/09/2023    HGB 13.4 11/10/2023    HGB 12.7 11/09/2023    HCT 41.7 11/10/2023    HCT 40.3 11/09/2023     11/10/2023     11/09/2023     BMP:   Lab Results   Component Value Date     11/10/2023     11/09/2023    POTASSIUM 5.0 11/10/2023    POTASSIUM 4.4 11/09/2023    CHLORIDE 105 11/10/2023    CHLORIDE 105 11/09/2023    CO2 25 11/10/2023    CO2 26 11/09/2023    BUN 9.4 11/10/2023    BUN 8.7 11/09/2023    CR 0.64 11/10/2023    CR 0.71 11/09/2023     (H) 11/10/2023     (H) 11/10/2023     COAGS:   Lab Results   Component Value Date    INR 0.86 05/14/2010     POC:   Lab Results   Component Value Date    HCG Negative 05/24/2004     HEPATIC:   Lab Results   Component Value Date    ALBUMIN 4.1 09/27/2022    PROTTOTAL 7.8  09/27/2022    ALT 70 (H) 09/27/2022    AST 39 09/27/2022    ALKPHOS 77 09/27/2022    BILITOTAL 0.5 09/27/2022     OTHER:   Lab Results   Component Value Date    A1C 6.6 (H) 10/31/2023    JAYCOB 10.6 (H) 11/10/2023    PHOS 2.9 11/10/2023    MAG 2.1 11/10/2023    TSH 0.16 (L) 11/10/2023    T4 2.00 (H) 11/10/2023       Anesthesia Plan    ASA Status:  3    - Procedure: Procedure only, no anesthetic delivered      Anesthesia Type:.Anesthesia Type: epidural placement no anesthesia adminsitered.              Consents    Anesthesia Plan(s) and associated risks, benefits, and realistic alternatives discussed. Questions answered and patient/representative(s) expressed understanding.     - Discussed: Risks, Benefits and Alternatives for the PROCEDURE were discussed     - Discussed with:  Patient            Postoperative Care            Comments:                Marlon Downing MD

## 2023-11-10 NOTE — PLAN OF CARE
Goal Outcome Evaluation:      Plan of Care Reviewed With: patient    Overall Patient Progress: improvingOverall Patient Progress: improving    Outcome Evaluation: harini, low as 31. CT scan performed overnight    Status: POD #4 R middle fossa approach for repair of encephalocele and CSF leak and placement of lumbar drain   Vitals: VSS on RA exc HTN SBP< 140. Hydralazine given once overnight. Pt is harini, HR low as 31. Team notified. EKG and CT performed overnight  Neuros: A&Ox4, strengths 5/5 throughout, denies N/T, R hearing diminished, nasal precautions   IV: PIV SL   Labs/Electrolytes: BG Checks ACHS   Resp/trach: LSC on RA  Diet: Regular. Given scheduled zofran for episodes of nausea   Bowel status: LBM 11/5, BS+. Pt has scheduled Senna and PRN MOM  : Voiding spontaneously, using bedpan  Skin: R head/ear incision covered with primapore to monitor drainage. Scopolamine patch in place behind R ear.   Pain: Headache managed with tylenol and oxycodone.   Activity: SBA /GB, not OOB this shift   Plan: Potential discharge today. Continue to monitor and follow POC

## 2023-11-10 NOTE — PROVIDER NOTIFICATION
"  MD Huntley paged at [phone] re:    \"NON URGENT/FYI Rm 6204 on 6A-HR as low as 40 while asleep.  Last /84.  Thanks Lea [phone]\"      MD paged at [phone] re:    \"Dodd in 6204 on 6A-HR now intermittently dropping down to 32 (good wave form, pulse ox changed).  Pt denies symptoms except tired.  /67.  Do you want EKG?  Thanks Lea [phone]\"      "

## 2023-11-10 NOTE — PLAN OF CARE
Status: underwent  R middle fossa approach for repair of encephalocele and CSF leak and placement of lumbar drain on 11/6, LD removed 11/8  Vitals: VSS  Neuros: O x 4, lethargic but arouses to voice. Soft-spoken  IV: PIV at 75 ml/hr  Labs/Electrolytes: WNL  Resp/trach: WNL  Diet: NPO for Blood Patch today  Bowel status: LBM 11/5, passing flatus  : voiding without difficulty, strict I&Os  Skin: R head/ear incision covered with primapore to monitor drainage. Scopolamine patch in place behind R ear.    Pain: HA managed effectively with PO oxycodone  Activity: up with SBA and GB  Social: family at bedside, supportive and helpful  Plan: blood patch this afternoon  Updates this shift: blood patch procedure completed, pt to lay flat until 1600.

## 2023-11-10 NOTE — OR NURSING
Epidural blood patch -block performed without complications.  VSS.  Pt tolerated well.   Patient is to remain flat for 2 hours starting at 2 pm until 4 pm.  Will continue to monitor.   Judah ABREU

## 2023-11-10 NOTE — PLAN OF CARE
Status: POD #3 R middle fossa approach for repair of encephalocele and CSF leak and placement of lumbar drain   Vitals: VSS SBP <140  Neuros: A&Ox4, strengths 5/5 throughout, denies N/T, R hearing diminished, nasal precautions   IV: PIV SL   Labs/Electrolytes: BG Checks ACHS   Resp/trach: LSC on RA  Diet: Regular diet, poor appetite d/t episodes of nausea   Bowel status: LBM 11/5, BS+   : Voiding spontaneously   Skin: R head/ear incision covered with primapore to monitor drainage. Scopolamine patch in place behind R ear. Lumbar drain removed 11/8.  Pain: Headache managed with tylenol and oxycodone.   Activity: SBA with GB, not OOB this shift   Social: Family visited this shift, supportive   Plan/Updates this shift: Zofran changed to scheduled this shift to manage nausea. Compazine given once this shift. Patient not OOB this shift d/t increasing headache and nausea when sitting at the edge of bed. Team aware. Continue to monitor.

## 2023-11-11 ENCOUNTER — APPOINTMENT (OUTPATIENT)
Dept: OCCUPATIONAL THERAPY | Facility: CLINIC | Age: 63
DRG: 025 | End: 2023-11-11
Attending: NEUROLOGICAL SURGERY
Payer: COMMERCIAL

## 2023-11-11 ENCOUNTER — APPOINTMENT (OUTPATIENT)
Dept: CT IMAGING | Facility: CLINIC | Age: 63
DRG: 025 | End: 2023-11-11
Payer: COMMERCIAL

## 2023-11-11 VITALS
TEMPERATURE: 97.6 F | HEART RATE: 51 BPM | BODY MASS INDEX: 31.31 KG/M2 | WEIGHT: 199.52 LBS | RESPIRATION RATE: 16 BRPM | DIASTOLIC BLOOD PRESSURE: 65 MMHG | OXYGEN SATURATION: 97 % | SYSTOLIC BLOOD PRESSURE: 129 MMHG | HEIGHT: 67 IN

## 2023-11-11 LAB
ANION GAP SERPL CALCULATED.3IONS-SCNC: 11 MMOL/L (ref 7–15)
BUN SERPL-MCNC: 12.4 MG/DL (ref 8–23)
CALCIUM SERPL-MCNC: 9.9 MG/DL (ref 8.8–10.2)
CHLORIDE SERPL-SCNC: 109 MMOL/L (ref 98–107)
CREAT SERPL-MCNC: 0.54 MG/DL (ref 0.51–0.95)
DEPRECATED HCO3 PLAS-SCNC: 21 MMOL/L (ref 22–29)
EGFRCR SERPLBLD CKD-EPI 2021: >90 ML/MIN/1.73M2
ERYTHROCYTE [DISTWIDTH] IN BLOOD BY AUTOMATED COUNT: 13 % (ref 10–15)
GLUCOSE BLDC GLUCOMTR-MCNC: 141 MG/DL (ref 70–99)
GLUCOSE BLDC GLUCOMTR-MCNC: 161 MG/DL (ref 70–99)
GLUCOSE BLDC GLUCOMTR-MCNC: 164 MG/DL (ref 70–99)
GLUCOSE SERPL-MCNC: 155 MG/DL (ref 70–99)
HCT VFR BLD AUTO: 38.1 % (ref 35–47)
HGB BLD-MCNC: 12.4 G/DL (ref 11.7–15.7)
MAGNESIUM SERPL-MCNC: 1.9 MG/DL (ref 1.7–2.3)
MCH RBC QN AUTO: 29 PG (ref 26.5–33)
MCHC RBC AUTO-ENTMCNC: 32.5 G/DL (ref 31.5–36.5)
MCV RBC AUTO: 89 FL (ref 78–100)
PHOSPHATE SERPL-MCNC: 3.1 MG/DL (ref 2.5–4.5)
PLATELET # BLD AUTO: 260 10E3/UL (ref 150–450)
POTASSIUM SERPL-SCNC: 3.8 MMOL/L (ref 3.4–5.3)
RBC # BLD AUTO: 4.28 10E6/UL (ref 3.8–5.2)
SODIUM SERPL-SCNC: 141 MMOL/L (ref 135–145)
WBC # BLD AUTO: 7.9 10E3/UL (ref 4–11)

## 2023-11-11 PROCEDURE — 258N000003 HC RX IP 258 OP 636

## 2023-11-11 PROCEDURE — 250N000011 HC RX IP 250 OP 636

## 2023-11-11 PROCEDURE — 70450 CT HEAD/BRAIN W/O DYE: CPT | Mod: 26 | Performed by: RADIOLOGY

## 2023-11-11 PROCEDURE — 70450 CT HEAD/BRAIN W/O DYE: CPT

## 2023-11-11 PROCEDURE — 250N000013 HC RX MED GY IP 250 OP 250 PS 637: Performed by: NEUROLOGICAL SURGERY

## 2023-11-11 PROCEDURE — 250N000011 HC RX IP 250 OP 636: Performed by: NURSE PRACTITIONER

## 2023-11-11 PROCEDURE — 36415 COLL VENOUS BLD VENIPUNCTURE: CPT

## 2023-11-11 PROCEDURE — 250N000013 HC RX MED GY IP 250 OP 250 PS 637

## 2023-11-11 PROCEDURE — 999N000128 HC STATISTIC PERIPHERAL IV START W/O US GUIDANCE

## 2023-11-11 PROCEDURE — 97110 THERAPEUTIC EXERCISES: CPT | Mod: GO

## 2023-11-11 PROCEDURE — 250N000013 HC RX MED GY IP 250 OP 250 PS 637: Performed by: NURSE PRACTITIONER

## 2023-11-11 PROCEDURE — 84100 ASSAY OF PHOSPHORUS: CPT

## 2023-11-11 PROCEDURE — 80048 BASIC METABOLIC PNL TOTAL CA: CPT

## 2023-11-11 PROCEDURE — 97535 SELF CARE MNGMENT TRAINING: CPT | Mod: GO

## 2023-11-11 PROCEDURE — 83735 ASSAY OF MAGNESIUM: CPT

## 2023-11-11 PROCEDURE — 85027 COMPLETE CBC AUTOMATED: CPT

## 2023-11-11 RX ORDER — ONDANSETRON 4 MG/1
4-8 TABLET, ORALLY DISINTEGRATING ORAL EVERY 6 HOURS PRN
Qty: 30 TABLET | Refills: 0 | Status: SHIPPED | OUTPATIENT
Start: 2023-11-11 | End: 2023-11-20

## 2023-11-11 RX ORDER — LEVETIRACETAM 500 MG/1
500 TABLET ORAL 2 TIMES DAILY
Qty: 4 TABLET | Refills: 0 | Status: SHIPPED | OUTPATIENT
Start: 2023-11-11 | End: 2023-11-14

## 2023-11-11 RX ORDER — PROCHLORPERAZINE MALEATE 5 MG
5 TABLET ORAL EVERY 6 HOURS PRN
Qty: 12 TABLET | Refills: 0 | Status: SHIPPED | OUTPATIENT
Start: 2023-11-11 | End: 2023-11-20

## 2023-11-11 RX ORDER — POTASSIUM CHLORIDE 750 MG/1
20 TABLET, EXTENDED RELEASE ORAL ONCE
Status: COMPLETED | OUTPATIENT
Start: 2023-11-11 | End: 2023-11-11

## 2023-11-11 RX ORDER — DILTIAZEM HYDROCHLORIDE 360 MG/1
CAPSULE, EXTENDED RELEASE ORAL
Qty: 90 CAPSULE | Refills: 3 | Status: SHIPPED | OUTPATIENT
Start: 2023-11-20 | End: 2023-11-20

## 2023-11-11 RX ORDER — SCOLOPAMINE TRANSDERMAL SYSTEM 1 MG/1
1 PATCH, EXTENDED RELEASE TRANSDERMAL
Qty: 4 PATCH | Refills: 0 | Status: SHIPPED | OUTPATIENT
Start: 2023-11-12 | End: 2023-11-20

## 2023-11-11 RX ORDER — MAGNESIUM OXIDE 400 MG/1
400 TABLET ORAL EVERY 4 HOURS
Status: COMPLETED | OUTPATIENT
Start: 2023-11-11 | End: 2023-11-11

## 2023-11-11 RX ADMIN — MAGNESIUM OXIDE TAB 400 MG (241.3 MG ELEMENTAL MG) 400 MG: 400 (241.3 MG) TAB at 10:08

## 2023-11-11 RX ADMIN — INSULIN ASPART 1 UNITS: 100 INJECTION, SOLUTION INTRAVENOUS; SUBCUTANEOUS at 08:07

## 2023-11-11 RX ADMIN — SODIUM CHLORIDE: 9 INJECTION, SOLUTION INTRAVENOUS at 01:38

## 2023-11-11 RX ADMIN — INSULIN ASPART 1 UNITS: 100 INJECTION, SOLUTION INTRAVENOUS; SUBCUTANEOUS at 12:30

## 2023-11-11 RX ADMIN — LISINOPRIL 20 MG: 20 TABLET ORAL at 08:07

## 2023-11-11 RX ADMIN — POLYETHYLENE GLYCOL 3350 17 G: 17 POWDER, FOR SOLUTION ORAL at 08:07

## 2023-11-11 RX ADMIN — POTASSIUM CHLORIDE 20 MEQ: 750 TABLET, EXTENDED RELEASE ORAL at 10:08

## 2023-11-11 RX ADMIN — ONDANSETRON 4 MG: 4 TABLET, ORALLY DISINTEGRATING ORAL at 15:26

## 2023-11-11 RX ADMIN — ATORVASTATIN CALCIUM 10 MG: 10 TABLET, FILM COATED ORAL at 08:07

## 2023-11-11 RX ADMIN — MAGNESIUM OXIDE TAB 400 MG (241.3 MG ELEMENTAL MG) 400 MG: 400 (241.3 MG) TAB at 12:30

## 2023-11-11 RX ADMIN — HEPARIN SODIUM 5000 UNITS: 5000 INJECTION, SOLUTION INTRAVENOUS; SUBCUTANEOUS at 12:30

## 2023-11-11 RX ADMIN — SENNOSIDES AND DOCUSATE SODIUM 1 TABLET: 8.6; 5 TABLET ORAL at 08:06

## 2023-11-11 RX ADMIN — ACETAMINOPHEN 650 MG: 325 TABLET, FILM COATED ORAL at 04:43

## 2023-11-11 RX ADMIN — LEVETIRACETAM 500 MG: 500 TABLET, FILM COATED ORAL at 08:07

## 2023-11-11 RX ADMIN — LEVOTHYROXINE SODIUM 125 MCG: 125 TABLET ORAL at 08:06

## 2023-11-11 RX ADMIN — ONDANSETRON 4 MG: 4 TABLET, ORALLY DISINTEGRATING ORAL at 14:22

## 2023-11-11 ASSESSMENT — ACTIVITIES OF DAILY LIVING (ADL)
ADLS_ACUITY_SCORE: 24

## 2023-11-11 NOTE — PLAN OF CARE
Status: R middle fossa approach for repair of encephalocele and CSF leak and placement of lumbar drain on 11/6, LD removed 11/8. Blood patch 11/10.   Vitals: VSS ex intermittent bradycardia in 40s. Cont pulse ox.   Neuros: A&O x4, denies n/t. Strengths 5/5 t/o. Hearing diminished in R ear, small amount of serosanguinous drainage from R ear, team aware.   IV: PIV infusing NS at 75 ml/hr. SL when PO intake appropriate.    Labs/Electrolytes: BG checks, no insulin needed this shift.   Resp/trach: LSC on RA   Diet: Regular diet, poor intake. Denied nausea.   Bowel status: LBM 11/5, denies abd discomfort. Scheduled senna given, declining further intervention.   : Voiding spontaneously   Skin: R head/ear incision WDL     Pain: HA improved. PRN tylenol given x1, declined oxycodone this shift. Compazine given x1 preemptively for nausea.   Activity: SBA/GB  Social: Family at bedside   Plan: Monitor ear drainage and CSF leak s/s

## 2023-11-11 NOTE — PROGRESS NOTES
"Otolaryngology Progress Note     SUBJECTIVE: No acute events overnight. Minimal drainage from ear, resolving. Headache and nausea improving. No vertigo. She had some hypertension overnight. Head CT with mild increase in subdural collections. Blood patch yesterday.      OBJECTIVE:   /42 (BP Location: Right arm)   Pulse 73   Temp 98.6  F (37  C) (Oral)   Resp 16   Ht 1.626 m (5' 4\")   Wt 69.3 kg (152 lb 12.5 oz)   SpO2 95%   BMI 26.22 kg/m     General: Alert and oriented x 3, No acute distress   HEENT: right incision covered. Gauze and tape over the right ear with minimal bloody drainage on it.    Pulmonary: Breathing non-labored, no stridor, no accessory muscle use.    LABS:  ROUTINE IP LABS (Last four results)  BMP  Recent Labs   Lab 11/11/23 0750 11/11/23 0659 11/11/23  0204 11/10/23  2106 11/10/23  0751 11/10/23  0620 11/09/23  0737 11/09/23 0528 11/08/23 0818 11/08/23  0723   NA  --  141  --   --   --  139  --  140  --  141   POTASSIUM  --  3.8  --   --   --  5.0  --  4.4  --  5.1   CHLORIDE  --  109*  --   --   --  105  --  105  --  107   JAYCOB  --  9.9  --   --   --  10.6*  --  9.8  --  9.9   CO2  --  21*  --   --   --  25  --  26  --  26   BUN  --  12.4  --   --   --  9.4  --  8.7  --  9.7   CR  --  0.54  --   --   --  0.64  --  0.71  --  0.62   * 155* 164* 138*   < > 185*   < > 175*   < > 172*    < > = values in this interval not displayed.     CBC  Recent Labs   Lab 11/11/23 0659 11/10/23  0620 11/09/23  0528 11/08/23  0723   WBC 7.9 8.1 8.0 8.8   RBC 4.28 4.68 4.39 4.26   HGB 12.4 13.4 12.7 12.4   HCT 38.1 41.7 40.3 38.6   MCV 89 89 92 91   MCH 29.0 28.6 28.9 29.1   MCHC 32.5 32.1 31.5 32.1   RDW 13.0 13.1 13.1 13.2    282 236 222     INRNo lab results found in last 7 days.    A/P: Wendy Dodd is a 62 year old female with a past medical history of right temporal bone encephalocele and CSF leak , now s/p right middle fossa approach for repair leak, and lumbar drain " placement, now removed    - Stop Ofloxacin drops  - Sinus Precautions:                   - Do not use a straw                  - Do not blow your nose                  - Do not strain (take stool softeners)                  - Open your mouth when you sneeze                  - No lifting greater than 20 lbs                  - Try not to lean forward and allow blood to build up in your head.  - OK for discharge from an ENT perspective    - Remainder of cares per primary team     -- Patient and above plan discussed with Dr Logan Lopez MD  Otolaryngology Head and Neck Surgery Resident, PGY-1

## 2023-11-11 NOTE — DISCHARGE SUMMARY
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    Weight as of this encounter: 90.5 kg (199 lb 8.3 oz).                       Procedures:   11/6/23:  1.  Right middle fossa approach for repair of encephalocele and CSF leak with lumbar drain placement  2.  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  Risks and benefits of a middle fossa approach for repair were had with the patient and informed consent was obtained in the clinic.           Hospital Course:   Patient underwent her surgery on 11/6/23. The operation was uncomplicated and she was admitted to the surgical ICU for routine post operative cares. On post operative day 1 she was doing well and transferred to the floor. On post operative day 2, her lumbar drain was clamped in the morning and pulled in the evening. Over the next 2 days, she began to feel unwell with significant positional " headache. CT head was obtained and showed bilateral subdural fluid collections that did increase in size slightly over the following day. Right temporal lobe edema was also identified. She therefore underwent an epidural blood patch with anesthesia on 11/10, which significantly improved her symptoms. The following day a CT head was obtained demonstrating improved fluid collections. She was cleared for home discharge by PT/OT and ENT teams. On post-operative day 5 she was ambulating, voiding without a mueller, eating a regular diet, pain was well controlled and therefore she was discharged at her and her family's request.  Of note, the patient had bradycardia during this admission despite holding of her PTA diltiazem. EKG showed sinus bradycardia.     Exam at discharge:  Alert & Oriented to person, place, time, and situation  Follows commands briskly  Speech fluent, spontaneous. No aphasia or dysarthria.  No gaze preference. No apparent hemineglect.  PERRL, EOMI  Face symmetric with sensation intact to light touch  Right hearing loss  Palate elevates symmetrically, uvula midline, tongue protrudes midline  Trapezii muscles 5/5 bilaterally  No pronator drift       Del Tr Bi WE WF Gr   R 5 5 5 5 5 5   L 5 5 5 5 5 5     HF KE KF DF PF EHL   R 5 5 5 5 5 5   L 5 5 5 5 5 5      Reflexes 2+ throughout     Sensation intact and symmetric to light touch throughout         Discharge Medications:     Current Discharge Medication List        START taking these medications    Details   acetaminophen (TYLENOL) 325 MG tablet Take 3 tablets (975 mg) by mouth every 6 hours as needed for mild pain  Qty: 30 tablet, Refills: 0    Associated Diagnoses: Encephalocele (H)      aspirin 81 MG EC tablet Do not resume this medication until discussed with your primary care provider, who should determine whether you need to continue taking it.    Associated Diagnoses: Type 2 diabetes mellitus without complication, without long-term current use of  insulin (H)      caffeine (NO-DOZE) 200 MG TABS tablet Take 1 tablet (200 mg) by mouth daily as needed (For headache. Do not take after 2pm.)  Qty: 8 tablet, Refills: 0    Associated Diagnoses: Encephalocele (H)      cyclobenzaprine (FLEXERIL) 10 MG tablet Take 1 tablet (10 mg) by mouth every 8 hours as needed for muscle spasms  Qty: 12 tablet, Refills: 0    Associated Diagnoses: Encephalocele (H)      levETIRAcetam (KEPPRA) 500 MG tablet Take 1 tablet (500 mg) by mouth 2 times daily for 2 days  Qty: 4 tablet, Refills: 0    Associated Diagnoses: Encephalocele (H)      ondansetron (ZOFRAN ODT) 4 MG ODT tab Take 1-2 tablets (4-8 mg) by mouth every 6 hours as needed for nausea or vomiting  Qty: 30 tablet, Refills: 0    Associated Diagnoses: Encephalocele (H); Postoperative nausea      polyethylene glycol (MIRALAX) 17 GM/Dose powder Take 17 g by mouth daily  Qty: 510 g, Refills: 0    Associated Diagnoses: Encephalocele (H)      prochlorperazine (COMPAZINE) 5 MG tablet Take 1 tablet (5 mg) by mouth every 6 hours as needed for nausea or vomiting  Qty: 12 tablet, Refills: 0    Associated Diagnoses: Encephalocele (H); Postoperative nausea      scopolamine (TRANSDERM) 1 MG/3DAYS 72 hr patch Place 1 patch onto the skin every 72 hours  Qty: 4 patch, Refills: 0    Associated Diagnoses: Encephalocele (H); Postoperative nausea      senna-docusate (SENOKOT-S/PERICOLACE) 8.6-50 MG tablet Take 1 tablet by mouth 2 times daily for 14 days  Qty: 28 tablet, Refills: 0    Associated Diagnoses: Encephalocele (H)           CONTINUE these medications which have CHANGED    Details   diltiazem ER (TIAZAC) 360 MG 24 hr ER beaded capsule TAKE ONE CAPSULE BY MOUTH ONCE DAILY ONCE DIRECTED TO RESTART BY YOUR PRIMARY CARE PROVIDER.  Qty: 90 capsule, Refills: 3    Comments: Patient should follow up with their PCP for restarting this medication. She has been bradycardic during her admission and this medication could worsen this.  Associated  Diagnoses: Benign essential hypertension           CONTINUE these medications which have NOT CHANGED    Details   atorvastatin (LIPITOR) 10 MG tablet Take 1 tablet (10 mg) by mouth daily  Qty: 90 tablet, Refills: 3    Associated Diagnoses: Type 2 diabetes mellitus without complication, without long-term current use of insulin (H)      Cyanocobalamin (B-12 PO) Take 1,000 mcg by mouth daily      Ferrous Sulfate 324 (65 Fe) MG TBEC Take 1 tablet by mouth daily      folic acid 800 MCG TABS Take 800 mcg by mouth daily      levothyroxine (SYNTHROID/LEVOTHROID) 125 MCG tablet Take 1 tablet (125 mcg) by mouth daily  Qty: 90 tablet, Refills: 3    Associated Diagnoses: Hypothyroidism, unspecified type      lisinopril (ZESTRIL) 20 MG tablet Take 1 tablet (20 mg) by mouth daily  Qty: 90 tablet, Refills: 3    Associated Diagnoses: Benign essential hypertension      metFORMIN (GLUCOPHAGE XR) 500 MG 24 hr tablet TAKE TWO TABLETS BY MOUTH TWICE A DAY  Qty: 360 tablet, Refills: 3    Associated Diagnoses: Type 2 diabetes mellitus without complication, without long-term current use of insulin (H)      nystatin (MYCOSTATIN) 665386 UNIT/GM external powder APPLY TO AFFECTED AREA(S) THREE TIMES A DAY AS NEEDED FOR RASH  Qty: 60 g, Refills: 3    Associated Diagnoses: Intertrigo      alcohol swab prep pads Use to swab area of injection/shaka as directed.  Qty: 100 each, Refills: 3    Associated Diagnoses: Type 2 diabetes mellitus without complication, without long-term current use of insulin (H)      !! blood glucose (NO BRAND SPECIFIED) test strip Use to test blood sugar 1 times daily or as directed. To accompany: Blood Glucose Monitor Brands: per insurance.  Qty: 100 strip, Refills: 6    Associated Diagnoses: Type 2 diabetes mellitus without complication, without long-term current use of insulin (H)      blood glucose calibration (NO BRAND SPECIFIED) solution To accompany: Blood Glucose Monitor Brands: per insurance.  Qty: 100 each,  Refills: 3    Associated Diagnoses: Type 2 diabetes mellitus without complication, without long-term current use of insulin (H)      !! blood glucose monitoring (ACCU-CHEK RENY PLUS) test strip Use to test blood sugar 2 times daily or as directed.  Qty: 200 strip, Refills: 11    Associated Diagnoses: Type 2 diabetes mellitus without complication, without long-term current use of insulin (H)      !! blood glucose monitoring (ACCU-CHEK FASTCLIX) lancets Use to test blood sugar 2 times daily or as directed.  Qty: 102 each, Refills: 11    Associated Diagnoses: Type 2 diabetes mellitus without complication, without long-term current use of insulin (H)      blood glucose monitoring (NO BRAND SPECIFIED) meter device kit Use to test blood sugar 1 times daily or as directed. Preferred blood glucose meter OR supplies to accompany: Blood Glucose Monitor Brands: per insurance.  Qty: 1 kit, Refills: 0    Associated Diagnoses: Type 2 diabetes mellitus without complication, without long-term current use of insulin (H)      !! thin (NO BRAND SPECIFIED) lancets Use with lanceting device. To accompany: Blood Glucose Monitor Brands: per insurance.  Qty: 100 each, Refills: 6    Associated Diagnoses: Type 2 diabetes mellitus without complication, without long-term current use of insulin (H)       !! - Potential duplicate medications found. Please discuss with provider.        STOP taking these medications       aspirin 81 MG tablet Comments:   Reason for Stopping:                       Discharge Instructions and Follow-Up:     Discharge diet: Regular   Discharge activity: You may advance activity as tolerated. No strenuous exercise or heay lifting greater than 10 lbs for 4 weeks or until seen and cleared in clinic.    Please follow sinus precautions upon discharge until seen by ENT, precautions as follows:   1.  Do not blow nose  2.  Dab gently under the nose to collect drainage  3.  Do not pick nose or insert tissues/cloths inside of  nose  4.  Sneeze with mouth open  5.  Do not strain, please take stool softeners as prescribed     Discharge follow-up: Follow-up with ENT and Neurosurgery clinics in 1-2 weeks as scheduled. You should have a CT scan completed at this visit.    Recommend PCP follow-up within 7 days of discharge to discuss whether you should restart taking aspirin (recommend not restarting until post-op day 14 if your provider feels you should restart) and your diltazem (your heart rate was very low during your hospitalization and this medication could make this worse).     Wound care: Ok to shower, however no scrubbing of the wound and no soaking of the wound, meaning no bathtubs or swimming pools. Pat dry only. Leave wound open to air. You should have your wound checked in about 2 weeks in clinic.     Please call if you have:  1. increased pain, redness, drainage, swelling at your incision  2. fevers > 101.5 F degrees  3. with any questions or concerns.  You may reach the Neurosurgery clinic at [phone] during regular work hours. ER at [phone].    and ask for the Neurosurgery Resident on call at [phone], during off hours or weekends.           Discharge Disposition:     Discharged to home          Kelly Champion MD, PhD  PGY-2 Neurosurgery    Please contact neurosurgery resident on call with questions.    Dial * * [phone], enter [phone] when prompted.

## 2023-11-11 NOTE — PLAN OF CARE
Goal Outcome Evaluation:    Status: POD #5 R middle fossa approach for repair of encephalocele and CSF leak and placement of lumbar drain   Vitals: VSS on RA exc intermittently HTN SBP< 160, and harini in low 40s. Continue pulse ox  Neuros: A&Ox4, denies N/T, R hearing diminished, nasal precautions.strengths 5/5 throughout   IV: PIV infusing NS @ 75 ml/hr  Labs/Electrolytes: BG Checks ACHS   Resp/trach: LSC on RA, denies SOB  Diet: Regular. Denies nausea   Bowel status: LBM 11/5. PRN bowel meds offered, pt refused  : Voiding spontaneously to BR  Skin: R ear incision covered with primapore to monitor drainage. R head incision BRIDGET  Pain: Headache managed with PRN tylenol   Activity: SBA /GB, steady on feet  Plan: Continue to monitor for drainage from R ear and follow POC

## 2023-11-11 NOTE — PROGRESS NOTES
"[facility], [place]     Neurosurgery Progress Note:  11/11/2023      Interval History: Headache and nausea much improved today s/p blood patch w anesthesia. Once again cleared for home by OT.    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     She is POD 5from Right middle fossa approach for repair of encephalocele and CSF leak and placement of lumbar drain    Clinically Significant Risk Factors           # Hypercalcemia: Highest Ca = 10.6 mg/dL in last 2 days, will monitor as appropriate        # Hypertension: Noted on problem list       # DMII: A1C = 6.6 % (Ref range: 0.0 - 5.6 %) within past 6 months   # Obesity: Estimated body mass index is 31.25 kg/m  as calculated from the following:    Height as of this encounter: 1.702 m (5' 7\").    Weight as of this encounter: 90.5 kg (199 lb 8.3 oz).        # Unexpected Postoperative SDH's, Extradural Hematoma   # Brain Compression     Plan:  Serial neuro exams  Pain control  HOB > 30 degrees  SBP <140  Keppra 7days  Continue to hold ASA - Patient should see her PCP to determine if this should be restarted. Recommend waiting 2 weeks post-op if plan is to resume.  Bowel regimen  PRN antiemetics and scopolamine patch   PT/OT  SCDs for DVT prophylaxis   CT 11/12 am, likely home discharge thereafter    -----------------------------------  Kelly Champion MD, PhD  PGY-2 Neurosurgery    Please contact neurosurgery resident on call with questions.    Dial * * [phone], enter [phone] when prompted.       Gen: Appears comfortable, NAD  Wound: clean, dry, intact; small volume serosanguinous drainage on gauze  Neurologic:  Alert & Oriented to person, place, time, and situation  Follows commands briskly  Speech fluent, spontaneous. No aphasia or dysarthria.  No gaze preference. No apparent hemineglect.  PERRL, EOMI  Face symmetric with sensation intact to light touch  Right " hearing loss  Palate elevates symmetrically, uvula midline, tongue protrudes midline  Trapezii muscles 5/5 bilaterally  No pronator drift     Del Tr Bi WE WF Gr   R 5 5 5 5 5 5   L 5 5 5 5 5 5    HF KE KF DF PF EHL   R 5 5 5 5 5 5   L 5 5 5 5 5 5     Reflexes 2+ throughout    Sensation intact and symmetric to light touch throughout    Objective:   Temp:  [97.4  F (36.3  C)-98.8  F (37.1  C)] 97.4  F (36.3  C)  Pulse:  [43-54] 51  Resp:  [14-19] 17  BP: (117-167)/(68-92) 121/74  SpO2:  [94 %-100 %] 96 %  I/O last 3 completed shifts:  In: -   Out: 875 [Urine:875]        LABS:  Recent Labs   Lab 11/11/23  0750 11/11/23  0659 11/11/23  0204 11/10/23  0751 11/10/23  0620 11/09/23  0737 11/09/23  0528   NA  --  141  --   --  139  --  140   POTASSIUM  --  3.8  --   --  5.0  --  4.4   CHLORIDE  --  109*  --   --  105  --  105   CO2  --  21*  --   --  25  --  26   ANIONGAP  --  11  --   --  9  --  9   * 155* 164*   < > 185*   < > 175*   BUN  --  12.4  --   --  9.4  --  8.7   CR  --  0.54  --   --  0.64  --  0.71   JAYCOB  --  9.9  --   --  10.6*  --  9.8    < > = values in this interval not displayed.       Recent Labs   Lab 11/11/23  0659   WBC 7.9   RBC 4.28   HGB 12.4   HCT 38.1   MCV 89   MCH 29.0   MCHC 32.5   RDW 13.0          IMAGING:  No results found for this or any previous visit (from the past 24 hour(s)).

## 2023-11-11 NOTE — DISCHARGE SUMMARY
Discharge time/date: 11/11/23 at 1550  Walked or Wheelchair: Wheelchair  PIV removed: Yes  Reviewed AVS with patient: Yes  Medication due times added to AVS in EPIC: N/A  Verbalized understanding of discharge with teachback: Yes  Medications retrieved from pharmacy: Yes  Supplies sent home: N/A  Belongings from security with patient: N/A

## 2023-11-13 NOTE — PLAN OF CARE
Occupational Therapy Discharge Summary    Reason for therapy discharge:    Discharged to home.    Progress towards therapy goal(s). See goals on Care Plan in [facility] electronic health record for goal details.  Goals met    Therapy recommendation(s):    No further therapy is recommended.

## 2023-11-14 ENCOUNTER — TELEPHONE (OUTPATIENT)
Dept: FAMILY MEDICINE | Facility: CLINIC | Age: 63
End: 2023-11-14
Payer: COMMERCIAL

## 2023-11-14 NOTE — TELEPHONE ENCOUNTER
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    What type of discharge? Inpatient  Risk of Hospital admission or ED visit: 40.2%  Is a TCM episode required? Yes  When should the patient follow up with PCP? 7 days of discharge., per hospital discharge plan.    Hospital/TCU/ED for chronic condition Discharge Protocol  \"Hi, my name is Nilsa Smith RN, a registered nurse, and I am calling from [facility].  I am calling to follow up and see how things are going for you after your recent emergency visit/hospital/TCU stay.\"    Tell me how you are doing now that you are home?\" No concerns for PCP.      Discharge Instructions  \"Let's review your discharge instructions.  What is/are the follow-up recommendations?       Discharge Instructions and Follow-Up:      Discharge diet: Regular   Discharge activity: You may advance activity as tolerated. No strenuous exercise or heay lifting greater than 10 lbs for 4 weeks or until seen and cleared in clinic.     Please follow sinus precautions upon discharge until seen by ENT, precautions as follows:   1.  Do not blow nose  2.  Dab gently under the nose to collect drainage  3.  Do not pick nose or insert tissues/cloths inside of nose  4.  Sneeze with mouth open  5.  Do not strain, please take stool softeners as prescribed      Discharge follow-up: Follow-up with ENT and Neurosurgery clinics in 1-2 weeks as scheduled. You should have a CT scan completed at this visit.     Recommend PCP follow-up within 7 days of discharge to discuss whether you should restart taking aspirin (recommend not restarting until post-op day 14 if your provider feels you should restart) and your diltazem (your heart rate was very low during your hospitalization and this medication could make this worse).      Wound care: Ok to shower, however no scrubbing of the wound and no soaking of the " "wound, meaning no bathtubs or swimming pools. Pat dry only. Leave wound open to air. You should have your wound checked in about 2 weeks in clinic.      Please call if you have:  1. increased pain, redness, drainage, swelling at your incision  2. fevers > 101.5 F degrees  3. with any questions or concerns.  You may reach the Neurosurgery clinic at [phone] during regular work hours. ER at [phone].    and ask for the Neurosurgery Resident on call at [phone], during off hours or weekends.    \"Has an appointment with your primary care provider been scheduled?\"   No (schedule appointment) - scheduled 11/20/23 with 1:45 pm arrival time.      \"When you see the provider, I would recommend that you bring your medications with you.\"    Medications  \"Tell me what changed about your medicines when you discharged?\"    Changes to chronic meds?    0-1    \"What questions do you have about your medications?\"    None     New diagnoses of heart failure, COPD, diabetes, or MI?    No       Post Discharge Medication Reconciliation Status: discharge medications reconciled, continue medications without change.    Was MTM referral placed (*Make sure to put transitions as reason for referral)?   No    Call Summary  \"What questions or concerns do you have about your recent visit and your follow-up care?\"     none    \"If you have questions or things don't continue to improve, we encourage you contact us through the main clinic number (give number).  Even if the clinic is not open, triage nurses are available 24/7 to help you.     We would like you to know that our clinic has extended hours (provide information).  We also have urgent care (provide details on closest location and hours/contact info)\"    \"Thank you for your time and take care!\"    PINO Cardoza, RN         "

## 2023-11-16 ENCOUNTER — OFFICE VISIT (OUTPATIENT)
Dept: OTOLARYNGOLOGY | Facility: CLINIC | Age: 63
End: 2023-11-16
Payer: COMMERCIAL

## 2023-11-16 ENCOUNTER — ANCILLARY PROCEDURE (OUTPATIENT)
Dept: CT IMAGING | Facility: CLINIC | Age: 63
End: 2023-11-16
Payer: COMMERCIAL

## 2023-11-16 ENCOUNTER — ALLIED HEALTH/NURSE VISIT (OUTPATIENT)
Dept: OTOLARYNGOLOGY | Facility: CLINIC | Age: 63
End: 2023-11-16
Payer: COMMERCIAL

## 2023-11-16 DIAGNOSIS — Z98.890 S/P CRANIOTOMY: ICD-10-CM

## 2023-11-16 DIAGNOSIS — Q01.9 ENCEPHALOCELE (H): ICD-10-CM

## 2023-11-16 DIAGNOSIS — Q01.8 TEMPORAL ENCEPHALOCELE (H): Primary | ICD-10-CM

## 2023-11-16 DIAGNOSIS — H90.6 MIXED CONDUCTIVE AND SENSORINEURAL HEARING LOSS OF BOTH EARS: Primary | ICD-10-CM

## 2023-11-16 DIAGNOSIS — Z98.890 STATUS POST CRANIECTOMY: ICD-10-CM

## 2023-11-16 LAB
ATRIAL RATE - MUSE: 47 BPM
DIASTOLIC BLOOD PRESSURE - MUSE: NORMAL MMHG
INTERPRETATION ECG - MUSE: NORMAL
P AXIS - MUSE: 33 DEGREES
PR INTERVAL - MUSE: 164 MS
QRS DURATION - MUSE: 108 MS
QT - MUSE: 428 MS
QTC - MUSE: 378 MS
R AXIS - MUSE: -56 DEGREES
SYSTOLIC BLOOD PRESSURE - MUSE: NORMAL MMHG
T AXIS - MUSE: 0 DEGREES
VENTRICULAR RATE- MUSE: 47 BPM

## 2023-11-16 PROCEDURE — 99024 POSTOP FOLLOW-UP VISIT: CPT | Performed by: OTOLARYNGOLOGY

## 2023-11-16 PROCEDURE — 99024 POSTOP FOLLOW-UP VISIT: CPT | Performed by: NEUROLOGICAL SURGERY

## 2023-11-16 PROCEDURE — 70450 CT HEAD/BRAIN W/O DYE: CPT | Mod: GC | Performed by: RADIOLOGY

## 2023-11-16 PROCEDURE — 99207 PR NO CHARGE NURSE ONLY: CPT

## 2023-11-16 NOTE — LETTER
11/16/2023      RE: Wendy Dodd  [address] [facility] [address]       Wendy Dodd is seen in the [facility] lateral skullbase clinic for a postoperative wound check. She was seen in conjunction with Dr. Gusman. She is s/p right middle fossa encephalocele repair 11/6/23. She was noted to have some temporal lobe edema postoperatively and also required a blood patch for her lumbar drain site. She reports that her headache has improved and she has not had any drainage from her ear or her nose. Her hearing remains down.    Physical examination:  female in no acute distress.  Alert and answering questions appropriately.  HB 1/6 bilaterally.  Sutures removed from her scalp and back. Incision healing with no areas of dehiscence, flap flat. Right ear examined. TM still appears intact and there is no otorrhea noted in the canal.    Imaging: CT head done today independently reviewed and compared to her prior studies. Temporal lobe edema is stable, possibly slightly improved. It has overall improved over time.    Assessment and plan:  Healing with improvement in her headache. Still with fatigue which is expected. She has another CT scheduled in 2 weeks and we will see her back in a month with an audiogram. She will continue her activity restrictions until then.    Kim Ford MD

## 2023-11-16 NOTE — NURSING NOTE
Patient presents for a nurse visit today for suture removal.    Procedure date: 11/06/2023  Procedure: Middle Fossa approach for encephalocele  Surgeon(s): Dr. Ford and Dr. Gusman    Patient reports pain level of 1 out of 10. Taking nothing for pain.    Incision appears clean, dry, and intact with no significant redness, swelling, or drainage. Edges are well-approximated. Sutures were removed without difficulty and patient tolerated well.     Provided education on incision care, pain management, and activity restrictions. Patient verbalized understanding of all instructions, and was encouraged to call with any further questions or concerns.     Follow up plan: Obtain CT scan in 2 weeks at your clinic in [place].

## 2023-11-16 NOTE — LETTER
11/16/2023       RE: Wendy Dodd  [address] [facility] [address]     Dear Colleague,    Thank you for referring your patient, Wendy Dodd, to the [facility] EAR NOSE AND THROAT CLINIC [place] at [facility]. Please see a copy of my visit note below.    Service Date: 11/16/2023      Kristen Kehr, PA-C  [address] [facility] [address]     Dear Ms. Kehr:    We saw Ms. 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  She was doing well immediately following surgery but then developed some confusion and increased somnolence.  We identified edema in the right temporal lobe, likely from retraction.  She also developed spinal headaches which responded to a lumbar epidural blood patch.    Currently, she is feeling much better.  She is no longer having the positional headaches.  Her family accompanied her and agree that her cognition seems to be at baseline.  She has the expected degree of fatigue.  She finished a 1 week course of Keppra for seizure prophylaxis.  She has not had any obvious seizures.  She has diminished hearing on the right side following surgery, which is to be expected.  She is not having any obvious otorrhea or rhinorrhea.    On exam, her general appearance is good.  Of note, she had all of her hair shaved.  The incision is healing well.  She is alert and fully oriented.  She follows commands readily.  She moves all extremities with good strength and coordination.  Facial strength is normal.  Sutures were removed today.    CT scan from earlier today shows very small subdural hygroma over the surgical site.  There is unchanged edema in the right anterior-mid temporal lobe.    Ms. Dodd is recovering from the operation reasonably well.  We will repeat a CT scan without contrast of the head in about 2 weeks and this can  be done in [place] for her convenience.  We will follow-up with her after reviewing the images.  We went over potential signs and symptoms of worsening temporal lobe edema.  We will keep informed of her progress.  Please not hesitate contact for questions.    Sincerely,        Jennifer Gusman MD  Department of Neurosurgery

## 2023-11-19 NOTE — PROGRESS NOTES
Service Date: 11/16/2023      Kristen Kehr, DALE  [address] [facility] [address]     Dear Ms. Kehr:    We saw Ms. 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  She was doing well immediately following surgery but then developed some confusion and increased somnolence.  We identified edema in the right temporal lobe, likely from retraction.  She also developed spinal headaches which responded to a lumbar epidural blood patch.    Currently, she is feeling much better.  She is no longer having the positional headaches.  Her family accompanied her and agree that her cognition seems to be at baseline.  She has the expected degree of fatigue.  She finished a 1 week course of Keppra for seizure prophylaxis.  She has not had any obvious seizures.  She has diminished hearing on the right side following surgery, which is to be expected.  She is not having any obvious otorrhea or rhinorrhea.    On exam, her general appearance is good.  Of note, she had all of her hair shaved.  The incision is healing well.  She is alert and fully oriented.  She follows commands readily.  She moves all extremities with good strength and coordination.  Facial strength is normal.  Sutures were removed today.    CT scan from earlier today shows very small subdural hygroma over the surgical site.  There is unchanged edema in the right anterior-mid temporal lobe.    Ms. Dodd is recovering from the operation reasonably well.  We will repeat a CT scan without contrast of the head in about 2 weeks and this can be done in [place] for her convenience.  We will follow-up with her after reviewing the images.  We went over potential signs and symptoms of worsening temporal lobe edema.  We will keep informed of her progress.  Please not hesitate contact for questions.    Sincerely,        Jennifer Gusman MD  Department of Neurosurgery

## 2023-11-20 ENCOUNTER — OFFICE VISIT (OUTPATIENT)
Dept: FAMILY MEDICINE | Facility: CLINIC | Age: 63
End: 2023-11-20
Payer: COMMERCIAL

## 2023-11-20 VITALS
SYSTOLIC BLOOD PRESSURE: 136 MMHG | WEIGHT: 198 LBS | RESPIRATION RATE: 20 BRPM | BODY MASS INDEX: 31.82 KG/M2 | HEART RATE: 89 BPM | HEIGHT: 66 IN | OXYGEN SATURATION: 97 % | TEMPERATURE: 98.3 F | DIASTOLIC BLOOD PRESSURE: 84 MMHG

## 2023-11-20 DIAGNOSIS — I10 BENIGN ESSENTIAL HYPERTENSION: ICD-10-CM

## 2023-11-20 DIAGNOSIS — E11.9 TYPE 2 DIABETES MELLITUS WITHOUT COMPLICATION, WITHOUT LONG-TERM CURRENT USE OF INSULIN (H): ICD-10-CM

## 2023-11-20 DIAGNOSIS — G96.01 CSF OTORRHEA: ICD-10-CM

## 2023-11-20 DIAGNOSIS — Z09 HOSPITAL DISCHARGE FOLLOW-UP: Primary | ICD-10-CM

## 2023-11-20 PROCEDURE — 99495 TRANSJ CARE MGMT MOD F2F 14D: CPT | Performed by: PHYSICIAN ASSISTANT

## 2023-11-20 ASSESSMENT — PAIN SCALES - GENERAL: PAINLEVEL: NO PAIN (0)

## 2023-11-20 NOTE — PATIENT INSTRUCTIONS
Hold the diltiazem    Continue with the lisinopril daily      Monitor blood pressure at home and keep a log of BP and pulse.     Follow up in 1 month

## 2023-11-20 NOTE — PROGRESS NOTES
"  Assessment & Plan     Hospital discharge follow-up  CSF otorrhea  She is doing well. Incision is healing well. She has an appointment for imaging and appropriate follow up with her surgical team    Benign essential hypertension  Well controlled without the diltiazem.   Stopped during her hospitalization due to bradycardia.   Will have her stay off of the medication since BP is well controlled on recheck.   I am going to have her monitor her BP and pulse at home over the next month.   She will return with her log of readings.   Plan to increase lisinopril if needed or add amlodipine.     Type 2 diabetes mellitus without complication, without long-term current use of insulin (H)  Stable, she is taking the metformin and doing well.            MED REC REQUIRED  Post Medication Reconciliation Status: discharge medications reconciled and changed, per note/orders  BMI:   Estimated body mass index is 31.96 kg/m  as calculated from the following:    Height as of this encounter: 1.676 m (5' 6\").    Weight as of this encounter: 89.8 kg (198 lb).   Weight management plan: not discussed today        Kristen M. Kehr, PA-C  [facility]    [place]   Wendy is a 63 year old, presenting for the following health issues:  Hospital F/U        11/20/2023     1:42 PM   Additional Questions   Roomed by Demetrius SOLIS MA       [facility]       Hospital Follow-up Visit:    Hospital/Nursing Home/IP Rehab Facility: [facility]  Date of Admission: 11/06/23  Date of Discharge: 11/11/23  Reason(s) for Admission: Encephalocele    Was your hospitalization related to COVID-19? No   Problems taking medications regularly:  None  Medication changes since discharge: None  Problems adhering to non-medication therapy:      Summary of hospitalization:  [facility] discharge summary reviewed  Diagnostic Tests/Treatments reviewed.  Follow up needed: continue follow up with surgical " "team  Other Healthcare Providers Involved in Patient s Care:         None  Update since discharge: improved.         Plan of care communicated with patient           Diabetes Follow-up    How often are you checking your blood sugar? Not at all  What concerns do you have today about your diabetes? None   Do you have any of these symptoms? (Select all that apply)  No numbness or tingling in feet.  No redness, sores or blisters on feet.  No complaints of excessive thirst.  No reports of blurry vision.  No significant changes to weight.  Have you had a diabetic eye exam in the last 12 months? No        BP Readings from Last 2 Encounters:   11/20/23 136/84   11/11/23 129/65     Hemoglobin A1C (%)   Date Value   10/31/2023 6.6 (H)   07/18/2023 7.2 (H)   04/07/2021 6.6 (H)   12/07/2020 7.1 (H)     LDL Cholesterol Calculated (mg/dL)   Date Value   07/26/2023 36   06/21/2022 42   04/07/2021 64   03/10/2020 74             Hypertension Follow-up    While she was in the hospital, the diltiazem was discontinued due to bradycardia. She is here today to discuss going back on the medication if indicated. She has not been checking her blood pressure at home.   Wendy has been on diltiazem for years. This medication has been prescribed as long as she has been a patient here in [facility].   She is taking the lisinopril 20 mg daily        Review of Systems   Constitutional, HEENT, cardiovascular, pulmonary, GI, , musculoskeletal, neuro, skin, endocrine and psych systems are negative, except as otherwise noted.      Objective    /84   Pulse 89   Temp 98.3  F (36.8  C) (Tympanic)   Resp 20   Ht 1.676 m (5' 6\")   Wt 89.8 kg (198 lb)   LMP  (LMP Unknown)   SpO2 97%   Breastfeeding No   BMI 31.96 kg/m    Body mass index is 31.96 kg/m .  Physical Exam   GENERAL: healthy, alert and no distress  PSYCH: mentation appears normal, affect normal/bright                      "

## 2023-11-20 NOTE — PROGRESS NOTES
I saw Ms. 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 Please see Dr. Ford's notes for complete details. In summary, she is a 62 year old right handed woman with right sided CSF otorrhea. 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 We discussed the risks of surgery including death, brain injury, seizures, infection, failure to improve, hemorrhage, need for reoperation, and she agrees to proceed. We will aim for surgery in November.  IRMA Gusman MD

## 2023-11-24 NOTE — PROGRESS NOTES
Wendy Dodd is seen in the [facility] lateral skullbase clinic for a postoperative wound check. She was seen in conjunction with Dr. Gusman. She is s/p right middle fossa encephalocele repair 11/6/23. She was noted to have some temporal lobe edema postoperatively and also required a blood patch for her lumbar drain site. She reports that her headache has improved and she has not had any drainage from her ear or her nose. Her hearing remains down.    Physical examination:  female in no acute distress.  Alert and answering questions appropriately.  HB 1/6 bilaterally.  Sutures removed from her scalp and back. Incision healing with no areas of dehiscence, flap flat. Right ear examined. TM still appears intact and there is no otorrhea noted in the canal.    Imaging: CT head done today independently reviewed and compared to her prior studies. Temporal lobe edema is stable, possibly slightly improved. It has overall improved over time.    Assessment and plan:  Healing with improvement in her headache. Still with fatigue which is expected. She has another CT scheduled in 2 weeks and we will see her back in a month with an audiogram. She will continue her activity restrictions until then.

## 2023-11-30 ENCOUNTER — HOSPITAL ENCOUNTER (OUTPATIENT)
Dept: CT IMAGING | Facility: CLINIC | Age: 63
Discharge: HOME | End: 2023-11-30
Attending: NEUROLOGICAL SURGERY | Admitting: NEUROLOGICAL SURGERY
Payer: COMMERCIAL

## 2023-11-30 DIAGNOSIS — Z98.890 STATUS POST CRANIECTOMY: ICD-10-CM

## 2023-11-30 DIAGNOSIS — Q01.8 TEMPORAL ENCEPHALOCELE (H): ICD-10-CM

## 2023-11-30 PROCEDURE — 70450 CT HEAD/BRAIN W/O DYE: CPT

## 2023-12-19 ENCOUNTER — OFFICE VISIT (OUTPATIENT)
Dept: FAMILY MEDICINE | Facility: CLINIC | Age: 63
End: 2023-12-19
Payer: COMMERCIAL

## 2023-12-19 VITALS
DIASTOLIC BLOOD PRESSURE: 78 MMHG | HEIGHT: 66 IN | OXYGEN SATURATION: 96 % | SYSTOLIC BLOOD PRESSURE: 146 MMHG | TEMPERATURE: 96 F | HEART RATE: 87 BPM | BODY MASS INDEX: 31.5 KG/M2 | WEIGHT: 196 LBS | RESPIRATION RATE: 16 BRPM

## 2023-12-19 DIAGNOSIS — I10 BENIGN ESSENTIAL HYPERTENSION: Primary | ICD-10-CM

## 2023-12-19 PROCEDURE — 99213 OFFICE O/P EST LOW 20 MIN: CPT | Mod: 24 | Performed by: PHYSICIAN ASSISTANT

## 2023-12-19 RX ORDER — LISINOPRIL 40 MG/1
40 TABLET ORAL DAILY
Qty: 90 TABLET | Refills: 3 | Status: SHIPPED | OUTPATIENT
Start: 2023-12-19 | End: 2024-03-19

## 2023-12-19 ASSESSMENT — PAIN SCALES - GENERAL: PAINLEVEL: WORST PAIN (10)

## 2023-12-19 NOTE — PROGRESS NOTES
"  Assessment & Plan     Benign essential hypertension  BP readings continue to be high at home and also in clinic.   Increase the lisinopril to 40 mg daily. Continue to monitor at home  Recheck in 3-4 months   - lisinopril (ZESTRIL) 40 MG tablet; Take 1 tablet (40 mg) by mouth daily                 Kristen M. Kehr, PA-C [facility]    Soraida Nguyen is a 63 year old, presenting for the following health issues:  Hypertension    She has been off of the diltiazem for the past month. She is checking her blood pressure at home.   Diastolic readings are high at home.     History of Present Illness       Hypertension: She presents for follow up of hypertension.  She does check blood pressure  regularly outside of the clinic. Outside blood pressures have been over 140/90. She does not follow a low salt diet.     She eats 0-1 servings of fruits and vegetables daily.She consumes 0 sweetened beverage(s) daily.She exercises with enough effort to increase her heart rate 9 or less minutes per day.  She exercises with enough effort to increase her heart rate 3 or less days per week.   She is taking medications regularly.                 Review of Systems   Constitutional, HEENT, cardiovascular, pulmonary, GI, , musculoskeletal, neuro, skin, endocrine and psych systems are negative, except as otherwise noted.      Objective    BP (!) 146/78   Pulse 87   Temp (!) 96  F (35.6  C) (Tympanic)   Resp 16   Ht 1.676 m (5' 6\")   Wt 88.9 kg (196 lb)   LMP  (LMP Unknown)   SpO2 96%   BMI 31.64 kg/m    Body mass index is 31.64 kg/m .  Physical Exam   GENERAL: healthy, alert and no distress  PSYCH: mentation appears normal, affect normal/bright                      "

## 2023-12-19 NOTE — PATIENT INSTRUCTIONS
Increase the dose of the lisinopril 20  mg to (2 tablets daily)     The new prescription was sent to the pharmacy for lisinopril 40 mg ( when you  the prescription, you will only need to take 1 tablet daily)      I will see you back in 3 months   Continue to monitor at home

## 2023-12-20 ENCOUNTER — OFFICE VISIT (OUTPATIENT)
Dept: AUDIOLOGY | Facility: OTHER | Age: 63
End: 2023-12-20
Payer: COMMERCIAL

## 2023-12-20 DIAGNOSIS — H90.6 MIXED HEARING LOSS, BILATERAL: Primary | ICD-10-CM

## 2023-12-20 PROCEDURE — 92557 COMPREHENSIVE HEARING TEST: CPT | Performed by: AUDIOLOGIST

## 2023-12-20 PROCEDURE — 92550 TYMPANOMETRY & REFLEX THRESH: CPT | Performed by: AUDIOLOGIST

## 2023-12-20 NOTE — PROGRESS NOTES
AUDIOLOGY REPORT:    Patient was referred from ENT by Dr. Ford for audiology evaluation. The patient has a history of right otorrhea that was determined to be from a CSF leak, which she had surgery to repair on 11/6/2023. The patient has a prior history of ear surgery in both ears. She was last tested on 7/12/2023 and results showed mild to moderately severe mixed hearing loss in the right ear and mild to moderate mixed hearing loss in the left ear. The patient has not noted any recent changes in hearing. She has not had any more ear drainage. The patient reports that she does not have tinnitus, ear pain, or ear pressure.    Testing:    Otoscopy:   Otoscopic exam indicates ears are clear of cerumen bilaterally     Tympanograms:    RIGHT: restricted eardrum mobility (type B)     LEFT:   restricted eardrum mobility (type B)    Reflexes (reported by stimulus ear):  RIGHT: Ipsilateral is absent at frequencies tested  RIGHT: Contralateral is absent at frequencies tested  LEFT:   Ipsilateral is absent at frequencies tested  LEFT:   Contralateral is absent at frequencies tested    Thresholds:   Pure Tone Thresholds assessed using conventional audiometry with good reliability from 250-8000 Hz bilaterally using insert earphones and circumaural headphones     RIGHT:   mild to moderately severe  mixed hearing loss    LEFT:     mild to moderately severe mixed hearing loss    Speech Reception Threshold:    RIGHT: 35 dB HL    LEFT:   40 dB HL  Results are in agreement with pure tone average.     Word Recognition Score:     RIGHT: 100% at 80 dB HL using NU-6 recorded word list.    LEFT:   100% at 80 dB HL using NU-6 recorded word list.    Discussed results with the patient. Thresholds are stable compared to 7/12/2023.    Patient is scheduled to follow up with ENT on 12/21/2023.     Emely Brown, CCC-A  Licensed Audiologist #[id number]  12/20/2023

## 2023-12-21 ENCOUNTER — TELEPHONE (OUTPATIENT)
Dept: OTOLARYNGOLOGY | Facility: CLINIC | Age: 63
End: 2023-12-21

## 2023-12-21 ENCOUNTER — OFFICE VISIT (OUTPATIENT)
Dept: OTOLARYNGOLOGY | Facility: CLINIC | Age: 63
End: 2023-12-21
Payer: COMMERCIAL

## 2023-12-21 VITALS
HEART RATE: 95 BPM | TEMPERATURE: 96.6 F | BODY MASS INDEX: 31.18 KG/M2 | HEIGHT: 66 IN | WEIGHT: 194 LBS | DIASTOLIC BLOOD PRESSURE: 93 MMHG | SYSTOLIC BLOOD PRESSURE: 138 MMHG | OXYGEN SATURATION: 95 %

## 2023-12-21 DIAGNOSIS — Z98.890 STATUS POST CRANIECTOMY: ICD-10-CM

## 2023-12-21 DIAGNOSIS — H81.10 BENIGN PAROXYSMAL POSITIONAL VERTIGO, UNSPECIFIED LATERALITY: ICD-10-CM

## 2023-12-21 DIAGNOSIS — H90.6 MIXED CONDUCTIVE AND SENSORINEURAL HEARING LOSS OF BOTH EARS: Primary | ICD-10-CM

## 2023-12-21 PROCEDURE — 99024 POSTOP FOLLOW-UP VISIT: CPT | Mod: GC | Performed by: OTOLARYNGOLOGY

## 2023-12-21 ASSESSMENT — PAIN SCALES - GENERAL: PAINLEVEL: SEVERE PAIN (7)

## 2023-12-21 NOTE — NURSING NOTE
"Chief Complaint   Patient presents with    RECHECK     5 week follow up      Blood pressure (!) 138/93, pulse 95, temperature (!) 96.6  F (35.9  C), height 1.676 m (5' 6\"), weight 88 kg (194 lb), SpO2 95%, not currently breastfeeding.  Moy Field LPN    "

## 2023-12-21 NOTE — TELEPHONE ENCOUNTER
Spoke with patient. She informed me that she will be going someplace closer to home for her Vestibular Therapy [place]

## 2023-12-21 NOTE — PROGRESS NOTES
"Wendy Dodd is is seen in the [facility] lateral skullbase clinic for follow up. She is a 63 year old female with a history of right ear CSF otorrhea from an unknown perforation site. She is s/p right middle fossa encephalocele repair 11/6/23.     Today she reports that she is doing very well. She is compliant with her activity restrictions. She feels that her hearing has returned to normal. She denies any otorrhea, otalgia, rhinorrhea, salty or metallic taste, or aural fullness.  She complains of new onset dizziness when she lies down. It lasts for about 30 seconds and resolves spontaneously. She also complains of some irritation at her lumbar drain site.     Physical examination:   BP (!) 138/93   Pulse 95   Temp (!) 96.6  F (35.9  C)   Ht 1.676 m (5' 6\")   Wt 88 kg (194 lb)   LMP  (LMP Unknown)   SpO2 95%   BMI 31.31 kg/m    Female in no acute distress.  Alert and answering questions appropriately.  HB 1/6 bilaterally.  Scalp incision is well healed. Her lower back has small extruding suture which was removed. Right ear examined under the microscope. Cerumen was removed with right angle pick and alligator. TM still appears intact with 75% posterior cartilage graft with a step off anterior superior that I cannot visualize around, normal anterior native TM with no evidence of effusion. There is no otorrhea noted in the canal.   The left TM was visualized with the operating otoscope which demonstrates non impacted cerumen and intact TM with cartilage graft.   Positive right Keyshawn-Halpike with torsional nystagmus.     Audiogram:  Audiogram was independently reviewed and compared to her prior test. Right mild downslopint to moderate mixed loss with a mid and high frequency mild sensorineural component and 100% speech discrimination, left mild downsloping to moderate mixed loss with a mild mid and high frequency sensorineural component with 100% speech discrimination, right and left normal " volume flat tympanograms.  This is essentially stable from her preoperative test.      Assessment and plan:  Wendy Dodd is a 63 year old female who is s/p right middle fossa encephalocele repair on 11/6/23.  She has done very well with her recovery and does not have any evidence of recurrent CSF leak. She initially presented with right CSF otorrhea but I have never been able to visualize the TM perforation site from which she was leaking. She still does not have any apparent perforation but fortunately she no longer has otorrhea. We discussed that if she ever develops otorrhea again, she should call and we will send her a vial to collect it for testing.   We have lifted her activity restrictions and she may return to regular activity. She also has new onset right BPPV and we will refer her to physical therapy.   She may follow up as needed if she develops recurrent symptoms or any new concerns.     Liz Gonzalez MD  Fellow Physician  Otology & Neurotology  [facility]     I, Kim Ford MD, saw this patient with the resident/fellow and agree with the resident/fellow s findings and plan of care as documented in the resident s/fellow s note. I was present for the entire procedure.    Kim Ford MD

## 2023-12-21 NOTE — PATIENT INSTRUCTIONS
You were seen in the ENT Clinic today by Dr. Ford. If you have any questions or concerns after your appointment, please contact us (see below)      2.   Please return to clinic as needed.     3.   A referral to vestibular therapy will be placed.         How to Contact Us:  Send a [employer] message to your provider. Our team will respond to you via [employer]. Occasionally, we will need to call you to get further information.  For urgent matters (Monday-Friday), call the ENT Clinic: [phone] and speak with a call center team member - they will route your call appropriately.   If you'd like to speak directly with a nurse, please find our contact information below. We do our best to check voicemail frequently throughout the day, and will work to call you back within 1-2 days. For urgent matters, please use the general clinic phone numbers listed above.      Yesy PERES RN  ENT RN Care Coordinator  Direct: [phone]    Molly CRUZ LPN  Direct: [phone]

## 2024-01-10 ENCOUNTER — THERAPY VISIT (OUTPATIENT)
Dept: PHYSICAL THERAPY | Facility: CLINIC | Age: 64
End: 2024-01-10
Attending: OTOLARYNGOLOGY
Payer: COMMERCIAL

## 2024-01-10 DIAGNOSIS — Z98.890 STATUS POST CRANIECTOMY: ICD-10-CM

## 2024-01-10 DIAGNOSIS — H81.10 BENIGN PAROXYSMAL POSITIONAL VERTIGO, UNSPECIFIED LATERALITY: ICD-10-CM

## 2024-01-10 PROCEDURE — 97112 NEUROMUSCULAR REEDUCATION: CPT | Mod: GP,59 | Performed by: PHYSICAL THERAPIST

## 2024-01-10 PROCEDURE — 95992 CANALITH REPOSITIONING PROC: CPT | Mod: GP | Performed by: PHYSICAL THERAPIST

## 2024-01-10 PROCEDURE — 97161 PT EVAL LOW COMPLEX 20 MIN: CPT | Mod: GP | Performed by: PHYSICAL THERAPIST

## 2024-01-10 NOTE — PROGRESS NOTES
PHYSICAL THERAPY EVALUATION  Type of Visit: Evaluation    See electronic medical record for Abuse and Falls Screening details.    Subjective   Patient reports may 2023 started to have fuild coming out of right ear , was found to have CSF leak and had surgery 11/6/23 and no longer has leak but since surgery when she lays down to her right quickly will have spinning 5-6 seconds , and when she bends forward to set her cat down will a light headed feeling . 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       Presenting condition or subjective complaint: Vertigp  Date of onset: 11/06/23    Relevant medical history:     Dates & types of surgery:      Prior diagnostic imaging/testing results:       Prior therapy history for the same diagnosis, illness or injury: No      Prior Level of Function  Transfers: Independent  Ambulation: Independent  ADL: Independent  IADL:  independent     Living Environment  Social support: With a significant other or spouse   Type of home: House; 1 level; Basement   Stairs to enter the home: Yes 2 Is there a railing: No   Ramp: No   Stairs inside the home: Yes 13 Is there a railing: Yes   Help at home:    Equipment owned:       Employment: Yes   Hobbies/Interests:      Patient goals for therapy: Lay down quickly, stand up quickly    Pain assessment:      Objective      Cognitive Status Examination  Orientation: Oriented to person, place and time   Level of Consciousness: Alert  Follows Commands and Answers Questions: 100% of the time  Personal Safety and Judgement: Intact  Memory:       OBSERVATION: slight forward head   INTEGUMENTARY:   POSTURE:   PALPATION:   RANGE OF MOTION: full AROM in cervical spine and no change in symptoms   STRENGTH: good strength x 4    BED MOBILITY: independent     TRANSFERS: independent     WHEELCHAIR MOBILITY:     GAIT:   Level of Rush:   Assistive Device(s):   Gait Deviations:   Gait Distance:   Stairs:     BALANCE:          SENSATION: normal     REFLEXES:   COORDINATION:   MUSCLE TONE:        VESTIBULAR EVALUATION  ADDITIONAL HISTORY:  Description of symptoms: I feel like the room is spinning  Dizzy attacks:   Start: November 7 after skull surgery   Last attack: 1 day ago   Frequency of occurrences: If i lay in bed too fast every night.   Length of attack: 6 seconds  Difficulty hearing: Both ears  Noise in ears? No    Alleviates symptoms: Moving slowly  Worsens symptoms: Moving fast  Activities that bring on symptoms: Other  Laying down too fast    Pertinent visual history:   Pertinent history of current vestibular problem:   DHI: Total Score: 6    Cervicogenic Screen    Neck ROM Normal   Vertebral Artery Test Normal   Alar Ligament Test    Transverse Ligament Test    Distraction    Neck Torsion Test (head still, body rotating)    Neck Torsion Test (head and body rotating)         Oculomotor Screen    Ocular ROM Normal   Smooth Pursuit Normal   Saccades Normal   VOR    VOR Cancellation    Head Impulse Test    Convergence Testing         Infrared Goggle Exam Vestibular Suppressant in Last 24 Hours?   Exam Completed With:    Spontaneous Nystagmus    Gaze Evoked Nystagmus    Head Shake Horizontal Nystagmus    Positional Testing    Supine Head-Hanging Test     Left Right   [place]-Hallpike Normal  Upbeating R torsional   Sidelying Test     HSCC Supine Roll Test     HSCC Forward Roll Test     [place] and Lean Test -  Sitting Erect    [place] and Lean Test - Seated, Head Bent 60 Degrees Forward    [place] and Lean Test - Seated, Head Bent Backwards       BPPV Canal(s): R Posterior  BPPV Type: Canalithasis    Dynamic Visual Acuity (DVA)    Static Acuity (LogMar)    Horizontal Head Movement at 1 Hz (LogMar)    Horizontal Head Movement at 2 Hz (LogMar)           Assessment & Plan   CLINICAL IMPRESSIONS  Medical Diagnosis: s/p cranietomy Z98.890 BPPV unspecified laterality H81.10    Treatment Diagnosis: dizzy   Impression/Assessment: Patient is a 63  year old female with dizzy  complaints.  The following significant findings have been identified: Dizziness and Disequilibrium . These impairments interfere with their ability to perform self care tasks as compared to previous level of function.     Clinical Decision Making (Complexity):  Clinical Presentation: Stable/Uncomplicated  Clinical Presentation Rationale: based on medical and personal factors listed in PT evaluation  Clinical Decision Making (Complexity): Low complexity    PLAN OF CARE  Treatment Interventions:  Modalities:  as needed   Interventions: Neuromuscular Re-education, Therapeutic Activity, Therapeutic Exercise, Canalith Repositioning    Long Term Goals     PT Goal 1  Goal Identifier: 1  Goal Description: instruction in HEP and compliant with it 5 of 7 days to improve quality of life  Target Date: 04/08/24  PT Goal 2  Goal Identifier: 2  Goal Description: patient to have resolved dizzy feeling currently dizzy score 6  Target Date: 04/08/24      Frequency of Treatment: recheck in 1 week if doing well will call and cancel , 4 Rx over 90 days if needed  Duration of Treatment:      Recommended Referrals to Other Professionals:   Education Assessment:   Learner/Method: Patient;Listening;Reading;Demonstration;Pictures/Video;No Barriers to Learning    Risks and benefits of evaluation/treatment have been explained.   Patient/Family/caregiver agrees with Plan of Care.     Evaluation Time:     PT Eval, Low Complexity Minutes (03219): 15       Signing Clinician: Nancy Moses, PT

## 2024-01-17 NOTE — PROGRESS NOTES
DISCHARGE  Reason for Discharge: Patient has met all goals.  Patient seen for evaluation and Rx and cancelled her follow up Rx as her dizzy was resolved   Equipment Issued: none    Discharge Plan: Patient to continue home program.    Referring Provider:  Kim Ford     01/10/24 0500   Appointment Info   Signing clinician's name / credentials sorin snyder PT   Total/Authorized Visits [email or web address]   Medical Diagnosis s/p cranietomy Z98.890 BPPV unspecified laterality H81.10   PT Tx Diagnosis dizzy   Progress Note/Certification   Onset of illness/injury or Date of Surgery 11/06/23   Therapy Frequency recheck in 1 week if doing well will call and cancel , 4 Rx over 90 days if needed   GOALS   PT Goals 2   PT Goal 1   Goal Identifier 1   Goal Description instruction in HEP and compliant with it 5 of 7 days to improve quality of life   Target Date 04/08/24   PT Goal 2   Goal Identifier 2   Goal Description patient to have resolved dizzy feeling currently dizzy score 6   Target Date 04/08/24   Objective Measures   Objective Measures Objective Measure 1   Objective Measure 1   Objective Measure dizzy score 6   Treatment Interventions (PT)   Interventions Neuromuscular Re-education;Standard Canalith Repositioning   Neuromuscular Re-education   Neuromuscular re-ed of mvmt, balance, coord, kinesthetic sense, posture, proprioception minutes (72739) 15   Neuro Re-ed 1 - Details instruction in HEP and exercises , Rx tonight sleep supine at 45 , also instructed in body mechanics when moving her cat   Skilled Intervention instruction in body mechanics and Rx sleep position   Patient Response/Progress good understanding and tolerance   Standard Canalith Repositioning   Standard canalith repositioning procedure minutes (33352) 15   Skilled Intervention epley to right   Patient Response/Progress good understanding and tolerance   Canalith Repositioning - Details hallpike to left neg to right positive nystagmus completed epley to right  and stayed in each position x 2 minutes   Eval/Assessments   PT Eval, Low Complexity Minutes (49113) 15   Education   Learner/Method Patient;Listening;Reading;Demonstration;Pictures/Video;No Barriers to Learning   Plan   Home program instruction in HEP and exercises , Rx tonight sleep supine at 45 , also instructed in body mechanics when moving her cat   Plan for next session recheck in 1 week if doing well will call and cancel , 4 Rx over 90 days if needed   Total Session Time   Timed Code Treatment Minutes 15   Total Treatment Time (sum of timed and untimed services) 45

## 2024-03-19 ENCOUNTER — OFFICE VISIT (OUTPATIENT)
Dept: FAMILY MEDICINE | Facility: CLINIC | Age: 64
End: 2024-03-19
Payer: COMMERCIAL

## 2024-03-19 VITALS
WEIGHT: 194 LBS | HEART RATE: 80 BPM | RESPIRATION RATE: 16 BRPM | SYSTOLIC BLOOD PRESSURE: 118 MMHG | BODY MASS INDEX: 29.4 KG/M2 | HEIGHT: 68 IN | DIASTOLIC BLOOD PRESSURE: 82 MMHG | TEMPERATURE: 96.2 F | OXYGEN SATURATION: 97 %

## 2024-03-19 DIAGNOSIS — E11.9 TYPE 2 DIABETES MELLITUS WITHOUT COMPLICATION, WITHOUT LONG-TERM CURRENT USE OF INSULIN (H): Primary | ICD-10-CM

## 2024-03-19 DIAGNOSIS — E03.9 HYPOTHYROIDISM, UNSPECIFIED TYPE: ICD-10-CM

## 2024-03-19 DIAGNOSIS — I10 BENIGN ESSENTIAL HYPERTENSION: ICD-10-CM

## 2024-03-19 PROBLEM — E66.01 MORBID OBESITY (H): Status: RESOLVED | Noted: 2021-11-09 | Resolved: 2024-03-19

## 2024-03-19 PROBLEM — Q01.9 ENCEPHALOCELE (H): Status: RESOLVED | Noted: 2023-11-06 | Resolved: 2024-03-19

## 2024-03-19 LAB
ANION GAP SERPL CALCULATED.3IONS-SCNC: 12 MMOL/L (ref 7–15)
BUN SERPL-MCNC: 10.7 MG/DL (ref 8–23)
CALCIUM SERPL-MCNC: 10.3 MG/DL (ref 8.8–10.2)
CHLORIDE SERPL-SCNC: 104 MMOL/L (ref 98–107)
CHOLEST SERPL-MCNC: 119 MG/DL
CREAT SERPL-MCNC: 0.61 MG/DL (ref 0.51–0.95)
CREAT UR-MCNC: 19.3 MG/DL
DEPRECATED HCO3 PLAS-SCNC: 27 MMOL/L (ref 22–29)
EGFRCR SERPLBLD CKD-EPI 2021: >90 ML/MIN/1.73M2
FASTING STATUS PATIENT QL REPORTED: NO
GLUCOSE SERPL-MCNC: 131 MG/DL (ref 70–99)
HBA1C MFR BLD: 6.9 % (ref 0–5.6)
HDLC SERPL-MCNC: 45 MG/DL
LDLC SERPL CALC-MCNC: 45 MG/DL
MICROALBUMIN UR-MCNC: 13.3 MG/L
MICROALBUMIN/CREAT UR: 68.91 MG/G CR (ref 0–25)
NONHDLC SERPL-MCNC: 74 MG/DL
POTASSIUM SERPL-SCNC: 4.1 MMOL/L (ref 3.4–5.3)
SODIUM SERPL-SCNC: 143 MMOL/L (ref 135–145)
TRIGL SERPL-MCNC: 144 MG/DL
TSH SERPL DL<=0.005 MIU/L-ACNC: 0.36 UIU/ML (ref 0.3–4.2)

## 2024-03-19 PROCEDURE — 36415 COLL VENOUS BLD VENIPUNCTURE: CPT | Performed by: PHYSICIAN ASSISTANT

## 2024-03-19 PROCEDURE — 80048 BASIC METABOLIC PNL TOTAL CA: CPT | Performed by: PHYSICIAN ASSISTANT

## 2024-03-19 PROCEDURE — 83036 HEMOGLOBIN GLYCOSYLATED A1C: CPT | Performed by: PHYSICIAN ASSISTANT

## 2024-03-19 PROCEDURE — 82570 ASSAY OF URINE CREATININE: CPT | Performed by: PHYSICIAN ASSISTANT

## 2024-03-19 PROCEDURE — 99214 OFFICE O/P EST MOD 30 MIN: CPT | Performed by: PHYSICIAN ASSISTANT

## 2024-03-19 PROCEDURE — 80061 LIPID PANEL: CPT | Performed by: PHYSICIAN ASSISTANT

## 2024-03-19 PROCEDURE — 82043 UR ALBUMIN QUANTITATIVE: CPT | Performed by: PHYSICIAN ASSISTANT

## 2024-03-19 PROCEDURE — G2211 COMPLEX E/M VISIT ADD ON: HCPCS | Performed by: PHYSICIAN ASSISTANT

## 2024-03-19 PROCEDURE — 84443 ASSAY THYROID STIM HORMONE: CPT | Performed by: PHYSICIAN ASSISTANT

## 2024-03-19 RX ORDER — LISINOPRIL 40 MG/1
40 TABLET ORAL DAILY
Qty: 90 TABLET | Refills: 3 | Status: SHIPPED | OUTPATIENT
Start: 2024-03-19

## 2024-03-19 RX ORDER — LEVOTHYROXINE SODIUM 125 UG/1
125 TABLET ORAL DAILY
Qty: 90 TABLET | Refills: 3 | Status: SHIPPED | OUTPATIENT
Start: 2024-03-19

## 2024-03-19 RX ORDER — ATORVASTATIN CALCIUM 10 MG/1
10 TABLET, FILM COATED ORAL DAILY
Qty: 90 TABLET | Refills: 3 | Status: SHIPPED | OUTPATIENT
Start: 2024-03-19

## 2024-03-19 RX ORDER — METFORMIN HCL 500 MG
TABLET, EXTENDED RELEASE 24 HR ORAL
Qty: 360 TABLET | Refills: 3 | Status: SHIPPED | OUTPATIENT
Start: 2024-03-19

## 2024-03-19 ASSESSMENT — PAIN SCALES - GENERAL: PAINLEVEL: NO PAIN (0)

## 2024-03-19 NOTE — PROGRESS NOTES
Assessment & Plan     Type 2 diabetes mellitus without complication, without long-term current use of insulin (H)  Will get her yearly fasting lab tests today.   - Adult Eye  Referral; Future  - Albumin Random Urine Quantitative with Creat Ratio; Future  - Hemoglobin A1c; Future  - Lipid panel reflex to direct LDL Fasting; Future  - atorvastatin (LIPITOR) 10 MG tablet; Take 1 tablet (10 mg) by mouth daily  - metFORMIN (GLUCOPHAGE XR) 500 MG 24 hr tablet; TAKE TWO TABLETS BY MOUTH TWICE A DAY  - Albumin Random Urine Quantitative with Creat Ratio  - Hemoglobin A1c  - Lipid panel reflex to direct LDL Fasting    Hypothyroidism, unspecified type  Check thyroid levels today.   Refills given  - TSH with free T4 reflex; Future  - levothyroxine (SYNTHROID/LEVOTHROID) 125 MCG tablet; Take 1 tablet (125 mcg) by mouth daily  - TSH with free T4 reflex    Benign essential hypertension  Stable condition   Refill given of the lisinopril   - Basic metabolic panel  (Ca, Cl, CO2, Creat, Gluc, K, Na, BUN); Future  - lisinopril (ZESTRIL) 40 MG tablet; Take 1 tablet (40 mg) by mouth daily  - Basic metabolic panel  (Ca, Cl, CO2, Creat, Gluc, K, Na, BUN)      Since the above chronic conditions have been well controlled and she is monitoring at home.   Plan to recheck her A1C in 6 months with a lab only appointment.   I will see her in the office again in 1 year for fasting lab tests and office visit.    The longitudinal plan of care for the diagnosis(es)/condition(s) as documented were addressed during this visit. Due to the added complexity in care, I will continue to support Wendy in the subsequent management and with ongoing continuity of care.                    Subjective   Wendy is a 63 year old, presenting for the following health issues:  Hypertension      3/19/2024     6:56 AM   Additional Questions   Roomed by Demetrius OSLIS MA     History of Present Illness       Diabetes:   She presents for follow up of diabetes.  She is  "checking home blood glucose one time daily.   She checks blood glucose before meals.  Blood glucose is sometimes over 200 and never under 70. She is aware of hypoglycemia symptoms including none.    She has no concerns regarding her diabetes at this time.   She is not experiencing numbness or burning in feet, excessive thirst, blurry vision, weight changes or redness, sores or blisters on feet. The patient has had a diabetic eye exam in the last 12 months. Eye exam performed on Mar 2023. [facility] eye [facility].        She eats 0-1 servings of fruits and vegetables daily.She consumes 0 sweetened beverage(s) daily.She exercises with enough effort to increase her heart rate 9 or less minutes per day.  She exercises with enough effort to increase her heart rate 3 or less days per week.   She is taking medications regularly.       She has had a busy year with her procedure on her ear.   She is doing well and now starting a new job.     She continues with the metformin, lisinopril, levothyroxine and lipitor.   She does check her blood pressure and blood sugars at home.                 Review of Systems  Constitutional, HEENT, cardiovascular, pulmonary, GI, , musculoskeletal, neuro, skin, endocrine and psych systems are negative, except as otherwise noted.      Objective    /82   Pulse 80   Temp (!) 96.2  F (35.7  C) (Tympanic)   Resp 16   Ht 1.715 m (5' 7.5\")   Wt 88 kg (194 lb)   LMP  (LMP Unknown)   SpO2 97%   Breastfeeding No   BMI 29.94 kg/m    Body mass index is 29.94 kg/m .  Physical Exam   GENERAL: alert and no distress  RESP: lungs clear to auscultation - no rales, rhonchi or wheezes  CV: regular rate and rhythm, normal S1 S2, no S3 or S4, no murmur, click or rub, no peripheral edema  MS: no gross musculoskeletal defects noted, no edema  PSYCH: mentation appears normal, affect normal/bright  Diabetic foot exam: normal DP and PT pulses, no trophic changes or ulcerative " lesions, and normal sensory exam    Admission on 11/06/2023, Discharged on 11/11/2023   Component Date Value Ref Range Status    ABO/RH(D) 11/06/2023 AB POS   Final    Antibody Screen 11/06/2023 Negative  Negative Final    SPECIMEN EXPIRATION DATE 11/06/2023 [id number]   Final    GLUCOSE BY METER POCT 11/06/2023 131 (H)  70 - 99 mg/dL Final    GLUCOSE BY METER POCT 11/06/2023 173 (H)  70 - 99 mg/dL Final    Sodium 11/07/2023 143  135 - 145 mmol/L Final    Reference intervals for this test were updated on 09/26/2023 to more accurately reflect our healthy population. There may be differences in the flagging of prior results with similar values performed with this method. Interpretation of those prior results can be made in the context of the updated reference intervals.     Potassium 11/07/2023 3.8  3.4 - 5.3 mmol/L Final    Chloride 11/07/2023 110 (H)  98 - 107 mmol/L Final    Carbon Dioxide (CO2) 11/07/2023 24  22 - 29 mmol/L Final    Anion Gap 11/07/2023 9  7 - 15 mmol/L Final    Urea Nitrogen 11/07/2023 10.4  8.0 - 23.0 mg/dL Final    Creatinine 11/07/2023 0.50 (L)  0.51 - 0.95 mg/dL Final    GFR Estimate 11/07/2023 >90  >60 mL/min/1.73m2 Final    Calcium 11/07/2023 9.9  8.8 - 10.2 mg/dL Final    Glucose 11/07/2023 166 (H)  70 - 99 mg/dL Final    Magnesium 11/07/2023 1.9  1.7 - 2.3 mg/dL Final    Phosphorus 11/07/2023 3.1  2.5 - 4.5 mg/dL Final    WBC Count 11/07/2023 12.5 (H)  4.0 - 11.0 10e3/uL Final    RBC Count 11/07/2023 4.11  3.80 - 5.20 10e6/uL Final    Hemoglobin 11/07/2023 11.8  11.7 - 15.7 g/dL Final    Hematocrit 11/07/2023 36.0  35.0 - 47.0 % Final    MCV 11/07/2023 88  78 - 100 fL Final    MCH 11/07/2023 28.7  26.5 - 33.0 pg Final    MCHC 11/07/2023 32.8  31.5 - 36.5 g/dL Final    RDW 11/07/2023 13.0  10.0 - 15.0 % Final    Platelet Count 11/07/2023 233  150 - 450 10e3/uL Final    GLUCOSE BY METER POCT 11/06/2023 209 (H)  70 - 99 mg/dL Final    GLUCOSE BY METER POCT 11/07/2023 123 (H)  70 - 99  mg/dL Final    GLUCOSE BY METER POCT 11/07/2023 106 (H)  70 - 99 mg/dL Final    GLUCOSE BY METER POCT 11/07/2023 177 (H)  70 - 99 mg/dL Final    GLUCOSE BY METER POCT 11/07/2023 173 (H)  70 - 99 mg/dL Final    Sodium 11/08/2023 141  135 - 145 mmol/L Final    Reference intervals for this test were updated on 09/26/2023 to more accurately reflect our healthy population. There may be differences in the flagging of prior results with similar values performed with this method. Interpretation of those prior results can be made in the context of the updated reference intervals.     Potassium 11/08/2023 5.1  3.4 - 5.3 mmol/L Final    Chloride 11/08/2023 107  98 - 107 mmol/L Final    Carbon Dioxide (CO2) 11/08/2023 26  22 - 29 mmol/L Final    Anion Gap 11/08/2023 8  7 - 15 mmol/L Final    Urea Nitrogen 11/08/2023 9.7  8.0 - 23.0 mg/dL Final    Creatinine 11/08/2023 0.62  0.51 - 0.95 mg/dL Final    GFR Estimate 11/08/2023 >90  >60 mL/min/1.73m2 Final    Calcium 11/08/2023 9.9  8.8 - 10.2 mg/dL Final    Glucose 11/08/2023 172 (H)  70 - 99 mg/dL Final    Magnesium 11/08/2023 2.1  1.7 - 2.3 mg/dL Final    Phosphorus 11/08/2023 2.3 (L)  2.5 - 4.5 mg/dL Final    WBC Count 11/08/2023 8.8  4.0 - 11.0 10e3/uL Final    RBC Count 11/08/2023 4.26  3.80 - 5.20 10e6/uL Final    Hemoglobin 11/08/2023 12.4  11.7 - 15.7 g/dL Final    Hematocrit 11/08/2023 38.6  35.0 - 47.0 % Final    MCV 11/08/2023 91  78 - 100 fL Final    MCH 11/08/2023 29.1  26.5 - 33.0 pg Final    MCHC 11/08/2023 32.1  31.5 - 36.5 g/dL Final    RDW 11/08/2023 13.2  10.0 - 15.0 % Final    Platelet Count 11/08/2023 222  150 - 450 10e3/uL Final    GLUCOSE BY METER POCT 11/07/2023 141 (H)  70 - 99 mg/dL Final    GLUCOSE BY METER POCT 11/08/2023 193 (H)  70 - 99 mg/dL Final    GLUCOSE BY METER POCT 11/08/2023 161 (H)  70 - 99 mg/dL Final    GLUCOSE BY METER POCT 11/08/2023 171 (H)  70 - 99 mg/dL Final    Sodium 11/09/2023 140  135 - 145 mmol/L Final    Reference intervals for  this test were updated on 09/26/2023 to more accurately reflect our healthy population. There may be differences in the flagging of prior results with similar values performed with this method. Interpretation of those prior results can be made in the context of the updated reference intervals.     Potassium 11/09/2023 4.4  3.4 - 5.3 mmol/L Final    Chloride 11/09/2023 105  98 - 107 mmol/L Final    Carbon Dioxide (CO2) 11/09/2023 26  22 - 29 mmol/L Final    Anion Gap 11/09/2023 9  7 - 15 mmol/L Final    Urea Nitrogen 11/09/2023 8.7  8.0 - 23.0 mg/dL Final    Creatinine 11/09/2023 0.71  0.51 - 0.95 mg/dL Final    GFR Estimate 11/09/2023 >90  >60 mL/min/1.73m2 Final    Calcium 11/09/2023 9.8  8.8 - 10.2 mg/dL Final    Glucose 11/09/2023 175 (H)  70 - 99 mg/dL Final    Magnesium 11/09/2023 2.0  1.7 - 2.3 mg/dL Final    Phosphorus 11/09/2023 3.1  2.5 - 4.5 mg/dL Final    WBC Count 11/09/2023 8.0  4.0 - 11.0 10e3/uL Final    RBC Count 11/09/2023 4.39  3.80 - 5.20 10e6/uL Final    Hemoglobin 11/09/2023 12.7  11.7 - 15.7 g/dL Final    Hematocrit 11/09/2023 40.3  35.0 - 47.0 % Final    MCV 11/09/2023 92  78 - 100 fL Final    MCH 11/09/2023 28.9  26.5 - 33.0 pg Final    MCHC 11/09/2023 31.5  31.5 - 36.5 g/dL Final    RDW 11/09/2023 13.1  10.0 - 15.0 % Final    Platelet Count 11/09/2023 236  150 - 450 10e3/uL Final    GLUCOSE BY METER POCT 11/08/2023 140 (H)  70 - 99 mg/dL Final    GLUCOSE BY METER POCT 11/09/2023 184 (H)  70 - 99 mg/dL Final    GLUCOSE BY METER POCT 11/09/2023 174 (H)  70 - 99 mg/dL Final    Radiologist flags 11/09/2023 Bilateral subdural hemorrhages (Urgent)   Final    GLUCOSE BY METER POCT 11/09/2023 216 (H)  70 - 99 mg/dL Final    GLUCOSE BY METER POCT 11/09/2023 198 (H)  70 - 99 mg/dL Final    Sodium 11/10/2023 139  135 - 145 mmol/L Final    Reference intervals for this test were updated on 09/26/2023 to more accurately reflect our healthy population. There may be differences in the flagging of prior  results with similar values performed with this method. Interpretation of those prior results can be made in the context of the updated reference intervals.     Potassium 11/10/2023 5.0  3.4 - 5.3 mmol/L Final    Chloride 11/10/2023 105  98 - 107 mmol/L Final    Carbon Dioxide (CO2) 11/10/2023 25  22 - 29 mmol/L Final    Anion Gap 11/10/2023 9  7 - 15 mmol/L Final    Urea Nitrogen 11/10/2023 9.4  8.0 - 23.0 mg/dL Final    Creatinine 11/10/2023 0.64  0.51 - 0.95 mg/dL Final    GFR Estimate 11/10/2023 >90  >60 mL/min/1.73m2 Final    Calcium 11/10/2023 10.6 (H)  8.8 - 10.2 mg/dL Final    Glucose 11/10/2023 185 (H)  70 - 99 mg/dL Final    Magnesium 11/10/2023 2.1  1.7 - 2.3 mg/dL Final    Phosphorus 11/10/2023 2.9  2.5 - 4.5 mg/dL Final    WBC Count 11/10/2023 8.1  4.0 - 11.0 10e3/uL Final    RBC Count 11/10/2023 4.68  3.80 - 5.20 10e6/uL Final    Hemoglobin 11/10/2023 13.4  11.7 - 15.7 g/dL Final    Hematocrit 11/10/2023 41.7  35.0 - 47.0 % Final    MCV 11/10/2023 89  78 - 100 fL Final    MCH 11/10/2023 28.6  26.5 - 33.0 pg Final    MCHC 11/10/2023 32.1  31.5 - 36.5 g/dL Final    RDW 11/10/2023 13.1  10.0 - 15.0 % Final    Platelet Count 11/10/2023 282  150 - 450 10e3/uL Final    GLUCOSE BY METER POCT 11/09/2023 178 (H)  70 - 99 mg/dL Final    GLUCOSE BY METER POCT 11/10/2023 173 (H)  70 - 99 mg/dL Final    Dr/RN Notified    Ventricular Rate 11/10/2023 42  BPM Final    Atrial Rate 11/10/2023 42  BPM Final    VA Interval 11/10/2023 170  ms Final    QRS Duration 11/10/2023 114  ms Final    QT 11/10/2023 442  ms Final    QTc 11/10/2023 369  ms Final    P Axis 11/10/2023 31  degrees Final    R AXIS 11/10/2023 -56  degrees Final    T Axis 11/10/2023 -11  degrees Final    Interpretation ECG 11/10/2023    Final                    Value:Sinus bradycardia  Left anterior fascicular block  Moderate voltage criteria for LVH, may be normal variant ( R in aVL , [place] product )  Abnormal ECG  No previous ECGs available  Confirmed  by fellow Juan Jose Chambers ([id number]) on 11/10/2023 8:43:12 AM  Confirmed by MD ARELIS, EARNEST ([id number]) on 11/10/2023 10:24:14 AM      GLUCOSE BY METER POCT 11/10/2023 167 (H)  70 - 99 mg/dL Final    TSH 11/10/2023 0.16 (L)  0.30 - 4.20 uIU/mL Final    Ventricular Rate 11/10/2023 47  BPM Final    Atrial Rate 11/10/2023 47  BPM Final    IN Interval 11/10/2023 164  ms Final    QRS Duration 11/10/2023 108  ms Final    QT 11/10/2023 428  ms Final    QTc 11/10/2023 378  ms Final    P Axis 11/10/2023 33  degrees Final    R AXIS 11/10/2023 -56  degrees Final    T Axis 11/10/2023 0  degrees Final    Interpretation ECG 11/10/2023    Final                    Value:Sinus bradycardia  Left anterior fascicular block  Minimal voltage criteria for LVH, may be normal variant ( [place] product )  Abnormal ECG  When compared with ECG of 10-NOV-2023 03:36,  No significant change was found  Confirmed by fellow Juan Jose Chambers ([id number]) on 11/15/2023 1:09:47 PM  Confirmed by MD DASHA, JOSE ([id number]) on 11/16/2023 4:45:42 PM      Free T4 11/10/2023 2.00 (H)  0.90 - 1.70 ng/dL Final    GLUCOSE BY METER POCT 11/10/2023 142 (H)  70 - 99 mg/dL Final    GLUCOSE BY METER POCT 11/10/2023 125 (H)  70 - 99 mg/dL Final    GLUCOSE BY METER POCT 11/10/2023 138 (H)  70 - 99 mg/dL Final    Sodium 11/11/2023 141  135 - 145 mmol/L Final    Reference intervals for this test were updated on 09/26/2023 to more accurately reflect our healthy population. There may be differences in the flagging of prior results with similar values performed with this method. Interpretation of those prior results can be made in the context of the updated reference intervals.     Potassium 11/11/2023 3.8  3.4 - 5.3 mmol/L Final    Chloride 11/11/2023 109 (H)  98 - 107 mmol/L Final    Carbon Dioxide (CO2) 11/11/2023 21 (L)  22 - 29 mmol/L Final    Anion Gap 11/11/2023 11  7 - 15 mmol/L Final    Urea Nitrogen 11/11/2023 12.4  8.0 - 23.0 mg/dL Final    Creatinine 11/11/2023 0.54  0.51 - 0.95 mg/dL  Final    GFR Estimate 11/11/2023 >90  >60 mL/min/1.73m2 Final    Calcium 11/11/2023 9.9  8.8 - 10.2 mg/dL Final    Glucose 11/11/2023 155 (H)  70 - 99 mg/dL Final    Magnesium 11/11/2023 1.9  1.7 - 2.3 mg/dL Final    Phosphorus 11/11/2023 3.1  2.5 - 4.5 mg/dL Final    WBC Count 11/11/2023 7.9  4.0 - 11.0 10e3/uL Final    RBC Count 11/11/2023 4.28  3.80 - 5.20 10e6/uL Final    Hemoglobin 11/11/2023 12.4  11.7 - 15.7 g/dL Final    Hematocrit 11/11/2023 38.1  35.0 - 47.0 % Final    MCV 11/11/2023 89  78 - 100 fL Final    MCH 11/11/2023 29.0  26.5 - 33.0 pg Final    MCHC 11/11/2023 32.5  31.5 - 36.5 g/dL Final    RDW 11/11/2023 13.0  10.0 - 15.0 % Final    Platelet Count 11/11/2023 260  150 - 450 10e3/uL Final    GLUCOSE BY METER POCT 11/11/2023 164 (H)  70 - 99 mg/dL Final    GLUCOSE BY METER POCT 11/11/2023 141 (H)  70 - 99 mg/dL Final    GLUCOSE BY METER POCT 11/11/2023 161 (H)  70 - 99 mg/dL Final           Signed Electronically by: Kristen M. Kehr, PA-C

## 2024-03-19 NOTE — PATIENT INSTRUCTIONS
Fasting lab tests will be completed today.     Diabetes and thyroid and blood pressure are well controlled.     Continue to work on healthy habits and take your medications     Return in 6 months for nonfasting lab tests (A1C)    I will see you back in 1 year.

## 2024-05-15 ENCOUNTER — TRANSFERRED RECORDS (OUTPATIENT)
Dept: MULTI SPECIALTY CLINIC | Facility: CLINIC | Age: 64
End: 2024-05-15
Payer: COMMERCIAL

## 2024-05-15 LAB — RETINOPATHY: NORMAL

## 2024-05-22 ENCOUNTER — NURSE TRIAGE (OUTPATIENT)
Dept: FAMILY MEDICINE | Facility: CLINIC | Age: 64
End: 2024-05-22
Payer: COMMERCIAL

## 2024-05-22 ENCOUNTER — APPOINTMENT (OUTPATIENT)
Dept: GENERAL RADIOLOGY | Facility: CLINIC | Age: 64
End: 2024-05-22
Attending: EMERGENCY MEDICINE
Payer: COMMERCIAL

## 2024-05-22 ENCOUNTER — HOSPITAL ENCOUNTER (EMERGENCY)
Facility: CLINIC | Age: 64
Discharge: HOME | End: 2024-05-22
Attending: EMERGENCY MEDICINE | Admitting: EMERGENCY MEDICINE
Payer: COMMERCIAL

## 2024-05-22 VITALS
BODY MASS INDEX: 29.19 KG/M2 | SYSTOLIC BLOOD PRESSURE: 135 MMHG | TEMPERATURE: 98.1 F | HEIGHT: 67 IN | OXYGEN SATURATION: 98 % | WEIGHT: 186 LBS | RESPIRATION RATE: 18 BRPM | DIASTOLIC BLOOD PRESSURE: 85 MMHG | HEART RATE: 80 BPM

## 2024-05-22 DIAGNOSIS — M25.461 EFFUSION OF RIGHT KNEE: ICD-10-CM

## 2024-05-22 LAB
APPEARANCE FLD: ABNORMAL
CELL COUNT BODY FLUID SOURCE: ABNORMAL
COLOR FLD: YELLOW
CRYSTALS SNV MICRO: ABNORMAL
GLUCOSE BODY FLUID SOURCE: NORMAL
GLUCOSE FLD-MCNC: 121 MG/DL
LYMPHOCYTES NFR FLD MANUAL: 2 %
MONOS+MACROS NFR FLD MANUAL: 33 %
NEUTS BAND NFR FLD MANUAL: 65 %
PROT FLD-MCNC: 3.8 G/DL
PROTEIN BODY FLUID SOURCE: NORMAL
WBC # FLD AUTO: 6400 /UL

## 2024-05-22 PROCEDURE — 82945 GLUCOSE OTHER FLUID: CPT | Performed by: EMERGENCY MEDICINE

## 2024-05-22 PROCEDURE — 89060 EXAM SYNOVIAL FLUID CRYSTALS: CPT | Performed by: EMERGENCY MEDICINE

## 2024-05-22 PROCEDURE — 84157 ASSAY OF PROTEIN OTHER: CPT | Performed by: EMERGENCY MEDICINE

## 2024-05-22 PROCEDURE — 89050 BODY FLUID CELL COUNT: CPT | Performed by: EMERGENCY MEDICINE

## 2024-05-22 PROCEDURE — 99284 EMERGENCY DEPT VISIT MOD MDM: CPT | Mod: 25 | Performed by: EMERGENCY MEDICINE

## 2024-05-22 PROCEDURE — 73562 X-RAY EXAM OF KNEE 3: CPT | Mod: RT

## 2024-05-22 PROCEDURE — 87070 CULTURE OTHR SPECIMN AEROBIC: CPT | Performed by: EMERGENCY MEDICINE

## 2024-05-22 PROCEDURE — 20610 DRAIN/INJ JOINT/BURSA W/O US: CPT | Mod: RT | Performed by: EMERGENCY MEDICINE

## 2024-05-22 PROCEDURE — 250N000011 HC RX IP 250 OP 636: Performed by: EMERGENCY MEDICINE

## 2024-05-22 RX ORDER — NAPROXEN 500 MG/1
500 TABLET ORAL 2 TIMES DAILY WITH MEALS
Qty: 6 TABLET | Refills: 0 | Status: SHIPPED | OUTPATIENT
Start: 2024-05-22 | End: 2024-05-25

## 2024-05-22 RX ORDER — TRIAMCINOLONE ACETONIDE 40 MG/ML
40 INJECTION, SUSPENSION INTRA-ARTICULAR; INTRAMUSCULAR ONCE
Status: COMPLETED | OUTPATIENT
Start: 2024-05-22 | End: 2024-05-22

## 2024-05-22 RX ADMIN — TRIAMCINOLONE ACETONIDE 40 MG: 40 INJECTION, SUSPENSION INTRA-ARTICULAR; INTRAMUSCULAR at 11:58

## 2024-05-22 ASSESSMENT — ACTIVITIES OF DAILY LIVING (ADL)
ADLS_ACUITY_SCORE: 38
ADLS_ACUITY_SCORE: 38

## 2024-05-22 ASSESSMENT — COLUMBIA-SUICIDE SEVERITY RATING SCALE - C-SSRS
1. IN THE PAST MONTH, HAVE YOU WISHED YOU WERE DEAD OR WISHED YOU COULD GO TO SLEEP AND NOT WAKE UP?: NO
2. HAVE YOU ACTUALLY HAD ANY THOUGHTS OF KILLING YOURSELF IN THE PAST MONTH?: NO
6. HAVE YOU EVER DONE ANYTHING, STARTED TO DO ANYTHING, OR PREPARED TO DO ANYTHING TO END YOUR LIFE?: NO

## 2024-05-22 NOTE — ED TRIAGE NOTES
Right knee flared up this past Sunday after doing lots of yard work and swelling is increasing and becoming more painful.  Having to use a cane just to help get around. No known injury that patient is aware of.

## 2024-05-22 NOTE — TELEPHONE ENCOUNTER
"Reason for Disposition   Patient sounds very sick or weak to the triager    Additional Information   Negative: Followed a knee injury   Negative: Followed a bee sting and has localized swelling (e.g., small area of puffy or swollen skin)   Negative: Followed an insect bite and has localized swelling (e.g., small area of puffy or swollen skin)   Negative: Knee pain is main symptom   Negative: Swelling of calf or leg is main symptom   Negative: Knee pain and fever   Negative: Knee redness and fever    Answer Assessment - Initial Assessment Questions  1. LOCATION: \"Where is the swelling located?\"  (e.g., left, right, both knees)      Right knee, when she does too much it swells and gets hard historically.   2. ONSET: \"When did the swelling start?\" \"Does it come and go, or is it there all the time?\"      Sunday after pushing lawn ,   3. SWELLING: \"How bad is the swelling?\" Or, \"How large is it?\" (e.g., mild, moderate, severe; size of localized swelling)     - NONE: No joint swelling.    - LOCALIZED: Localized; small area of puffy or swollen skin (e.g., insect bite, skin irritation).    - MILD: Joint looks or feels mildly swollen or puffy.    - MODERATE: Swollen; interferes with normal activities (e.g., work or school); can't move joint normally (bend and straighten completely); may be limping.    - SEVERE: Very swollen; can't move swollen joint at all; limping a lot or unable to walk.      Hard to walk, fat all the way around  4. PAIN: \"Is there any pain?\" If Yes, ask: \"How bad is it?\" (Scale 1-10; or mild, moderate, severe)    - NONE (0): no pain.    - MILD (1-3): doesn't interfere with normal activities.     - MODERATE (4-7): interferes with normal activities (e.g., work or school) or awakens from sleep, limping.     - SEVERE (8-10): excruciating pain, unable to do any normal activities, unable to walk.       9/10  5. SETTING: \"Has there been any recent work, exercise or other activity that involved that part of " "the body?\"       Pushed   6. AGGRAVATING FACTORS: \"What makes the knee swelling worse?\" (e.g., walking, climbing stairs, running)      Not asked  7. ASSOCIATED SYMPTOMS: \"Is there any pain or redness?\"      Swollen all the way around  8. OTHER SYMPTOMS: \"Do you have any other symptoms?\" (e.g., chest pain, difficulty breathing, fever, calf pain)      Not asked  9. PREGNANCY: \"Is there any chance you are pregnant?\" \"When was your last menstrual period?\"      Na    Protocols used: Knee Swelling-A-OH    "

## 2024-05-22 NOTE — ED PROVIDER NOTES
History     chief complaint  HPI  Patient is a 63-year-old female with a history of hypertension, elevated BMI, type 2 diabetes who is presenting with a chief complaint of right knee pain and swelling.  Patient states that on Sunday she was mowing the lawn and after that her knee has been progressively more and more swollen.  It is hard to bend it fully now because of the mass effect in the joint.  It is not significantly painful but more uncomfortable as it becomes more swollen.  No trauma to the area.  She states this has happened throughout her life every time she uses it aggressively.  No fevers, chills, or other systemic symptoms.  No numbness/tingling/weakness distally.    Review of Systems:  All organ systems below were reviewed and are negative unless indicated in the HPI.    Constitutional  Eyes  ENT  Respiratory  Cardiovascular  Gastrointestinal  Genitourinary  Musculoskeletal  Skin  Neuro    Allergies:  Allergies   Allergen Reactions    Penicillins     Sulfa Antibiotics Unknown       Problem List:    Patient Active Problem List    Diagnosis Date Noted    Esophageal reflux 09/24/2018     Priority: Medium    Type 2 diabetes mellitus without complication, without long-term current use of insulin (H) 04/23/2017     Priority: Medium    Benign essential hypertension 03/27/2017     Priority: Medium     Lisinopril   Diltiazem discontinued due to low heart rate 11/2023.       Hypothyroidism, unspecified type 03/27/2017     Priority: Medium    Non morbid obesity due to excess calories 03/27/2017     Priority: Medium    History of colonic polyps 03/01/2017     Priority: Medium     Colonoscopy 2016      Diverticulosis of large intestine 12/12/2016     Priority: Medium    External hemorrhoids 12/12/2016     Priority: Medium    Advanced directives, counseling/discussion 04/21/2016     Priority: Medium     Information given to patient.     STEPHANIE Raymundo MA            Past Medical History:    Past Medical History:    Diagnosis Date    Cancer (H)     Cerebral infarction (H)     Diabetes (H)     Hypertension     Sleep apnea     Thyroid disease     Type 2 diabetes mellitus without complication, without long-term current use of insulin (H)        Past Surgical History:    Past Surgical History:   Procedure Laterality Date    ABDOMEN SURGERY      APPENDECTOMY      BIOPSY      COLONOSCOPY      COLONOSCOPY N/A 2/22/2022    Procedure: COLONOSCOPY;  Surgeon: Devin Chawla MD;  Location: [facility]    ENT SURGERY      INSERT DRAIN LUMBAR N/A 11/6/2023    Procedure: Insert drain lumbar;  Surgeon: Jennifer Gusman MD;  Location: [facility]    NERVE BLOCK PERIPHERAL N/A 11/10/2023    Procedure: Regional block;  Surgeon: GENERIC ANESTHESIA PROVIDER;  Location: [facility]    OTS CRANIOTOMY, MIDDLE FOSSA N/A 11/6/2023    Procedure: Stealth assisted CRANIOTOMY, MIDDLE FOSSA APPROACH, FOR ENCEPHALOCELE REPAIR, WITH TEMPORALIS MUSCLE FLAP;  Surgeon: Jennifer Gusman MD;  Location: [facility] OR       Family History:    Family History   Problem Relation Age of Onset    Colon Cancer Father     Other Cancer Father         liver    Other Cancer Mother     Other Cancer Brother     Skin Cancer Sister     Asthma No family hx of     Allergic rhinitis No family hx of        Medications:    naproxen (NAPROSYN) 500 MG tablet  alcohol swab prep pads  aspirin 81 MG EC tablet  atorvastatin (LIPITOR) 10 MG tablet  blood glucose (NO BRAND SPECIFIED) test strip  blood glucose calibration (NO BRAND SPECIFIED) solution  blood glucose monitoring (ACCU-CHEK FASTCLIX) lancets  blood glucose monitoring (NO BRAND SPECIFIED) meter device kit  Cyanocobalamin (B-12 PO)  Ferrous Sulfate 324 (65 Fe) MG TBEC  folic acid 800 MCG TABS  levothyroxine (SYNTHROID/LEVOTHROID) 125 MCG tablet  lisinopril (ZESTRIL) 40 MG tablet  metFORMIN (GLUCOPHAGE XR) 500 MG 24 hr tablet  nystatin (MYCOSTATIN) 583536 UNIT/GM external powder  thin (NO BRAND SPECIFIED) lancets          Physical  "Exam   BP: (!) 177/92  Pulse: 105  Temp: 98.1  F (36.7  C)  Resp: 18  Height: 170.2 cm (5' 7\")  Weight: 84.4 kg (186 lb)  SpO2: 97 %      Gen: Vital signs reviewed  Eyes: Sclera white, pupils round  ENT: External ears and nares normal  Card: Appears well-perfused  Resp: No respiratory distress.   Extremities: Large right knee effusion.  No pain with range of motion until she gets to end flexion.  No significant bony tenderness.  Skin: No erythema or warmth.  Neuro: Alert.  5/5 dorsiflexion plantarflexion of right foot.  Normal sensation.      ED Course        Arthrocentesis    Date/Time: 5/22/2024 1:07 PM    Performed by: Tone Russell MD  Authorized by: Tone Russell MD    Risks, benefits and alternatives discussed.      LOCATION:   Location:  Knee  Knee:  R knee  ANESTHESIA (see MAR for exact dosages):   Anesthesia method:  Local infiltration  Local anesthetic:  Bupivacaine 0.5% w/o epi    PROCEDURE DETAILS:     Needle gauge:  18 G    Ultrasound guidance: no      Approach:  Lateral    Aspirate amount:  90 ml    Aspirate characteristics:  Yellow    Steroid injected: yes      Specimen collected: yes      Dressing: adhesive bandage      PROCEDURE    Patient Tolerance:  Patient tolerated the procedure well with no immediate complications                  Results for orders placed or performed during the hospital encounter of 05/22/24 (from the past 24 hour(s))   XR Knee Right 3 Views    Narrative    XR KNEE RIGHT 3 VIEWS 5/22/2024 11:18 AM     HISTORY: pain, swelling, overuse    COMPARISON: None.         Impression    IMPRESSION: Chondrocalcinosis medial and lateral compartment right  knee. Osteophytic spurring medial joint line. No evidence for acute  fracture. There is a small effusion.    JULIANNA DOTY MD         SYSTEM ID:  [id number]   Cell count with differential fluid    Narrative    The following orders were created for panel order Cell count with differential fluid.  Procedure                              "  Abnormality         Status                     ---------                               -----------         ------                     Cell Count Body Fluid[[id number]]        Abnormal            Final result               Differential Body Fluid[[id number]]                          Final result                 Please view results for these tests on the individual orders.   Cell Count Body Fluid   Result Value Ref Range    Color Yellow Colorless, Yellow    Clarity Hazy (A) Clear    Cell Count Fluid Source Knee, Right     Total Nucleated Cells 6,400 /uL    Narrative    No reference ranges have been established.  This result  should be interpreted in the context of the patient's clinical condition and   compared to simultaneous measurement in the patient's blood.         Differential Body Fluid   Result Value Ref Range    % Neutrophils 65 %    % Lymphocytes 2 %    % Monocyte/Macrophages 33 %    Narrative    No reference ranges have been established. This result should be interpreted in the context of the patient's clinical condition and compared to simultaneous measurement in the patient's blood.       Medications   triamcinolone (KENALOG-40) injection 40 mg (40 mg INTRA-ARTICULAR $Given by Other 5/22/24 7253)         Consultations:  None    Social Determinants of Health:   currently has a knee replacement and she is taking care of him.  Patient just retired last week.    Independent Review of Notes:  Reviewed most recent PCP note on 3/19/2024 on arrival  Assessments & Plan (with Medical Decision Making)       I have reviewed the nursing notes.    I have reviewed the findings, diagnosis, plan and need for follow up with the patient.      Medical Decision Making  On arrival, patient hypertensive and tachycardic.  She is anxious appearing.  She has no evidence of a septic joint.  Do think this is a reactive joint effusion like she has gotten throughout her life secondary to increased activity on the knee.  X-ray  ordered for baseline.  Discussed extensively risks and benefits of different approaches including joint effusion drainage and triamcinolone injection versus wrapping and oral anti-inflammatory medications.  Patient states that she would like the quickest method as she needs to be able to take care of her  while he is postoperative from his knee replacement.  She would like the injection.  I pulled out about 90 mL of straw-colored fluid with significant relief of her pain.  Injected a steroid after discussing risk and benefits.  The fluid analysis appears inflammatory.  Culture ordered.  Referred to sports medicine for follow-up.  Discussed appropriate return precautions and she was discharged home in stable condition.    Final diagnoses:   Effusion of right knee         Tone Russell M.D.   [facility] Emergency Department     Tone Russell MD  05/22/24 5934

## 2024-05-22 NOTE — DISCHARGE INSTRUCTIONS
I have injected numbing medication and steroids into your knee.  You will have increased pain in your right knee in 4 to 8 hours when the numbing medication wears off.  In the next several days that pain should go away as the steroids kick in.  You can take the Ace wrap off tonight.  Try to limit the amount of physical activity you are doing in the next several days but do not rest completely.    If you develop a red significantly more painful joint or fever please return here immediately.    I have given you follow-up with sports medicine.  Take naproxen 500 mg twice per day for the next 3 days.  I have prescribed this for you.

## 2024-05-23 ENCOUNTER — TELEPHONE (OUTPATIENT)
Dept: EMERGENCY MEDICINE | Facility: CLINIC | Age: 64
End: 2024-05-23
Payer: COMMERCIAL

## 2024-05-23 ENCOUNTER — TELEPHONE (OUTPATIENT)
Dept: FAMILY MEDICINE | Facility: CLINIC | Age: 64
End: 2024-05-23
Payer: COMMERCIAL

## 2024-05-23 NOTE — TELEPHONE ENCOUNTER
"[facility]    Reason for call: Lab Result Notification     Lab Result (including Rx patient on, if applicable).  If culture, copy of lab report at bottom.  Lab Result:   Component      Latest Ref Rng 5/22/2024  12:01 PM   Crystal Analysis      No clinically significant crystals seen.  Positive for intracellular crystals, consistent with calcium pyrophosphate dihydrate crystals. !       Legend:  ! Abnormal        Patient's current Symptoms:   \"My knee is better today\"    RN Recommendations/Instructions per [facility] ED lab result protocol:   [facility] ED lab result protocol utilized: Misc  Reviewed information about referral to ortho.  Encouraged to call number listed if she does not hear back from them in 2 days.      Patient/care giver notified to contact your PCP clinic or return to the Emergency department if your:  Symptoms worsen or other concerning symptoms.    Rudy Lisa RN   "

## 2024-05-23 NOTE — TELEPHONE ENCOUNTER
Attempted to reach pt to complete hospital follow-up call. There was no answer. Unable to leave voicemail as mailbox is not set up.    If pt returns call please complete the following:    Please complete Post Discharge Assessment questionnaire found in Screenings tab.      2.  After completing Post Discharge Assessment questionnaire, please enter the following dot phrase and to complete note (.[email or web address]):      Kaleigh BONDSN, RN

## 2024-05-23 NOTE — TELEPHONE ENCOUNTER
Transitions of Care Outreach  Chief Complaint   Patient presents with    Hospital F/U       Most Recent Admission Date: 5/22/2024   Most Recent Admission Diagnosis:      Most Recent Discharge Date: 5/22/2024   Most Recent Discharge Diagnosis: Effusion of right knee - M25.461     Transitions of Care Assessment    Discharge Assessment  How are you doing now that you are home?: Really good.  How are your symptoms? (Red Flag symptoms escalate to triage hotline per guidelines): Improved  Do you know how to contact your clinic care team if you have future questions or changes to your health status? : Yes  Does the patient have their discharge instructions? : No - Review discharge instructions  Does the patient have questions regarding their discharge instructions? : No  Were you started on any new medications or were there changes to any of your previous medications? : Yes (Naproxen)  Does the patient have all of their medications?: Yes  Do you have questions regarding any of your medications? : No  Do you have all of your needed medical supplies or equipment (DME)?  (i.e. oxygen tank, CPAP, cane, etc.): Yes    Follow up Plan     Discharge Follow-Up  Discharge follow up appointment scheduled in alignment with recommended follow up timeframe or Transitions of Risk Category? (Low = within 30 days; Moderate= within 14 days; High= within 7 days): No  Discharge Follow Up Appointment Date: 05/29/24  Discharge Follow Up Appointment Scheduled with?: Primary Care Provider    Future Appointments   Date Time Provider Department Center   5/29/2024  1:30 PM Kehr, Kristen M, PA-C [facility]   9/17/2024  3:45 PM ER LAB C [facility]   3/10/2025  5:00 PM Kehr, Kristen M, PA-C [facility]     Outpatient Plan as outlined on AVS reviewed with patient.    For any urgent concerns, please contact our 24 hour nurse triage line: [phone] ([phone])       Nicole Sen RN

## 2024-05-26 ENCOUNTER — HEALTH MAINTENANCE LETTER (OUTPATIENT)
Age: 64
End: 2024-05-26

## 2024-05-27 LAB
BACTERIA SNV CULT: NO GROWTH
GRAM STAIN RESULT: NORMAL
GRAM STAIN RESULT: NORMAL

## 2024-05-29 ENCOUNTER — OFFICE VISIT (OUTPATIENT)
Dept: FAMILY MEDICINE | Facility: CLINIC | Age: 64
End: 2024-05-29
Payer: COMMERCIAL

## 2024-05-29 VITALS
HEART RATE: 74 BPM | TEMPERATURE: 97.4 F | DIASTOLIC BLOOD PRESSURE: 82 MMHG | OXYGEN SATURATION: 98 % | SYSTOLIC BLOOD PRESSURE: 124 MMHG | WEIGHT: 187 LBS | RESPIRATION RATE: 16 BRPM | BODY MASS INDEX: 28.34 KG/M2 | HEIGHT: 68 IN

## 2024-05-29 DIAGNOSIS — E11.9 TYPE 2 DIABETES MELLITUS WITHOUT COMPLICATION, WITHOUT LONG-TERM CURRENT USE OF INSULIN (H): ICD-10-CM

## 2024-05-29 DIAGNOSIS — M11.80 CALCIUM PYROPHOSPHATE CRYSTAL ARTHRITIS: Primary | ICD-10-CM

## 2024-05-29 LAB
BASOPHILS # BLD AUTO: 0 10E3/UL (ref 0–0.2)
BASOPHILS NFR BLD AUTO: 0 %
EOSINOPHIL # BLD AUTO: 0.4 10E3/UL (ref 0–0.7)
EOSINOPHIL NFR BLD AUTO: 4 %
ERYTHROCYTE [DISTWIDTH] IN BLOOD BY AUTOMATED COUNT: 13.1 % (ref 10–15)
HCT VFR BLD AUTO: 43.8 % (ref 35–47)
HGB BLD-MCNC: 14.2 G/DL (ref 11.7–15.7)
IMM GRANULOCYTES # BLD: 0 10E3/UL
IMM GRANULOCYTES NFR BLD: 0 %
LYMPHOCYTES # BLD AUTO: 2.1 10E3/UL (ref 0.8–5.3)
LYMPHOCYTES NFR BLD AUTO: 21 %
MCH RBC QN AUTO: 27.9 PG (ref 26.5–33)
MCHC RBC AUTO-ENTMCNC: 32.4 G/DL (ref 31.5–36.5)
MCV RBC AUTO: 86 FL (ref 78–100)
MONOCYTES # BLD AUTO: 0.7 10E3/UL (ref 0–1.3)
MONOCYTES NFR BLD AUTO: 7 %
NEUTROPHILS # BLD AUTO: 6.8 10E3/UL (ref 1.6–8.3)
NEUTROPHILS NFR BLD AUTO: 69 %
PLATELET # BLD AUTO: 306 10E3/UL (ref 150–450)
RBC # BLD AUTO: 5.09 10E6/UL (ref 3.8–5.2)
WBC # BLD AUTO: 10 10E3/UL (ref 4–11)

## 2024-05-29 PROCEDURE — 85025 COMPLETE CBC W/AUTO DIFF WBC: CPT | Performed by: PHYSICIAN ASSISTANT

## 2024-05-29 PROCEDURE — 36415 COLL VENOUS BLD VENIPUNCTURE: CPT | Performed by: PHYSICIAN ASSISTANT

## 2024-05-29 PROCEDURE — 99214 OFFICE O/P EST MOD 30 MIN: CPT | Performed by: PHYSICIAN ASSISTANT

## 2024-05-29 PROCEDURE — 83970 ASSAY OF PARATHORMONE: CPT | Performed by: PHYSICIAN ASSISTANT

## 2024-05-29 PROCEDURE — 80053 COMPREHEN METABOLIC PANEL: CPT | Performed by: PHYSICIAN ASSISTANT

## 2024-05-29 PROCEDURE — 84100 ASSAY OF PHOSPHORUS: CPT | Performed by: PHYSICIAN ASSISTANT

## 2024-05-29 PROCEDURE — 84466 ASSAY OF TRANSFERRIN: CPT | Performed by: PHYSICIAN ASSISTANT

## 2024-05-29 PROCEDURE — 83735 ASSAY OF MAGNESIUM: CPT | Performed by: PHYSICIAN ASSISTANT

## 2024-05-29 ASSESSMENT — PAIN SCALES - GENERAL: PAINLEVEL: NO PAIN (0)

## 2024-05-29 NOTE — PROGRESS NOTES
"  Assessment & Plan     Calcium pyrophosphate crystal arthritis  Check the following tests today.   Discussed findings. She has not had symptoms or pain / swelling since.   Discussed future flare ups and treatment with prednisone vs NSAIDS. Since she tried the naproxen with this past flare up, most likely will need to prescribe prednisone. Consider Orthopedic referral if needed in the future. She is  unable to do this now due to an insurance change.   - Comprehensive metabolic panel (BMP + Alb, Alk Phos, ALT, AST, Total. Bili, TP); Future  - Parathyroid Hormone Intact; Future  - Magnesium; Future  - Transferrin; Future  - CBC with platelets and differential; Future  - Phosphorus; Future  - Comprehensive metabolic panel (BMP + Alb, Alk Phos, ALT, AST, Total. Bili, TP)  - Parathyroid Hormone Intact  - Magnesium  - Transferrin  - CBC with platelets and differential  - Phosphorus    Type 2 diabetes mellitus without complication, without long-term current use of insulin (H)  - blood glucose (NO BRAND SPECIFIED) test strip; Use to test blood sugar 1 times daily or as directed. To accompany: Blood Glucose Monitor Brands: per insurance.        MED REC REQUIRED  Post Medication Reconciliation Status:     BMI  Estimated body mass index is 28.86 kg/m  as calculated from the following:    Height as of this encounter: 1.715 m (5' 7.5\").    Weight as of this encounter: 84.8 kg (187 lb).   Weight management plan: Discussed healthy diet and exercise guidelines          Soraida Nguyen is a 63 year old, presenting for the following health issues:  [facility] F/U        5/29/2024     1:03 PM   Additional Questions   Roomed by Demetrius SOLIS MA       Via the Health Maintenance questionnaire, the patient has reported the following services have been completed -Eye Exam: I-MD 2024-05-15, this information has been sent to the abstraction team.  HOWARD Nguyen was recently seen in the ER.   She had fluid removed from the knee " "and there are calcium pyrophosphate crystals present.   She had a steroid injection when she was there.   She has not had pain or swelling since.     She also is requesting a refill of test strips for diabetes.           ED/UC Followup:    Facility:  [facility]  Date of visit: 05/22/24  Reason for visit: right knee pain/swollen  Current Status: improved        Review of Systems  Constitutional, HEENT, cardiovascular, pulmonary, GI, , musculoskeletal, neuro, skin, endocrine and psych systems are negative, except as otherwise noted.      Objective    /82   Pulse 74   Temp 97.4  F (36.3  C) (Tympanic)   Resp 16   Ht 1.715 m (5' 7.5\")   Wt 84.8 kg (187 lb)   LMP  (LMP Unknown)   SpO2 98%   Breastfeeding No   BMI 28.86 kg/m    Body mass index is 28.86 kg/m .  Physical Exam   GENERAL: alert and no distress  MS: no gross musculoskeletal defects noted, no edema  PSYCH: mentation appears normal, affect normal/bright    Admission on 05/22/2024, Discharged on 05/22/2024   Component Date Value Ref Range Status    Crystals Analysis 05/22/2024 Positive for intracellular crystals, consistent with calcium pyrophosphate dihydrate crystals. (A)  No clinically significant crystals seen. Final    Glucose Fluid Source 05/22/2024 Knee, Right   Final    Glucose fluid 05/22/2024 121  mg/dL Final    Protein Fluid Source 05/22/2024 Knee, Right   Final    Protein Total Fluid 05/22/2024 3.8  g/dL Final    Culture 05/22/2024 No Growth   Final    Gram Stain Result 05/22/2024 No organisms seen   Final    Gram Stain Result 05/22/2024 2+ WBC seen   Final    Color 05/22/2024 Yellow  Colorless, Yellow Final    Clarity 05/22/2024 Hazy (A)  Clear Final    Cell Count Fluid Source 05/22/2024 Knee, Right   Final    Total Nucleated Cells 05/22/2024 6,400  /uL Final    % Neutrophils 05/22/2024 65  % Final    % Lymphocytes 05/22/2024 2  % Final    % Monocyte/Macrophages 05/22/2024 33  % Final           Signed " Electronically by: Kristen M. Kehr, PA-C

## 2024-05-30 LAB
ALBUMIN SERPL BCG-MCNC: 4.3 G/DL (ref 3.5–5.2)
ALP SERPL-CCNC: 72 U/L (ref 40–150)
ALT SERPL W P-5'-P-CCNC: 17 U/L (ref 0–50)
ANION GAP SERPL CALCULATED.3IONS-SCNC: 8 MMOL/L (ref 7–15)
AST SERPL W P-5'-P-CCNC: 16 U/L (ref 0–45)
BILIRUB SERPL-MCNC: 0.4 MG/DL
BUN SERPL-MCNC: 22.3 MG/DL (ref 8–23)
CALCIUM SERPL-MCNC: 10.9 MG/DL (ref 8.8–10.2)
CHLORIDE SERPL-SCNC: 107 MMOL/L (ref 98–107)
CREAT SERPL-MCNC: 0.86 MG/DL (ref 0.51–0.95)
DEPRECATED HCO3 PLAS-SCNC: 27 MMOL/L (ref 22–29)
EGFRCR SERPLBLD CKD-EPI 2021: 75 ML/MIN/1.73M2
GLUCOSE SERPL-MCNC: 123 MG/DL (ref 70–99)
MAGNESIUM SERPL-MCNC: 2 MG/DL (ref 1.7–2.3)
PHOSPHATE SERPL-MCNC: 2.5 MG/DL (ref 2.5–4.5)
POTASSIUM SERPL-SCNC: 5.2 MMOL/L (ref 3.4–5.3)
PROT SERPL-MCNC: 7 G/DL (ref 6.4–8.3)
PTH-INTACT SERPL-MCNC: 29 PG/ML (ref 15–65)
SODIUM SERPL-SCNC: 142 MMOL/L (ref 135–145)
TRANSFERRIN SERPL-MCNC: 217 MG/DL (ref 200–360)

## 2024-07-11 ENCOUNTER — PATIENT OUTREACH (OUTPATIENT)
Dept: CARE COORDINATION | Facility: CLINIC | Age: 64
End: 2024-07-11
Payer: COMMERCIAL

## 2024-09-12 ENCOUNTER — DOCUMENTATION ONLY (OUTPATIENT)
Dept: FAMILY MEDICINE | Facility: CLINIC | Age: 64
End: 2024-09-12
Payer: COMMERCIAL

## 2024-09-12 DIAGNOSIS — E11.9 TYPE 2 DIABETES MELLITUS WITHOUT COMPLICATION, WITHOUT LONG-TERM CURRENT USE OF INSULIN (H): Primary | ICD-10-CM

## 2024-09-12 NOTE — PROGRESS NOTES
This patient has an appointment for lab work but does not have any orders. Please place some future orders as needed.    Thank You,  Concha Fischer MLT (ASCP)

## 2024-10-13 ENCOUNTER — HEALTH MAINTENANCE LETTER (OUTPATIENT)
Age: 64
End: 2024-10-13

## 2025-02-17 ENCOUNTER — PATIENT OUTREACH (OUTPATIENT)
Dept: CARE COORDINATION | Facility: CLINIC | Age: 65
End: 2025-02-17
Payer: COMMERCIAL

## 2025-04-29 DIAGNOSIS — E11.9 TYPE 2 DIABETES MELLITUS WITHOUT COMPLICATION, WITHOUT LONG-TERM CURRENT USE OF INSULIN (H): ICD-10-CM

## 2025-04-29 DIAGNOSIS — E03.9 HYPOTHYROIDISM, UNSPECIFIED TYPE: ICD-10-CM

## 2025-04-29 DIAGNOSIS — I10 BENIGN ESSENTIAL HYPERTENSION: ICD-10-CM

## 2025-04-29 RX ORDER — LEVOTHYROXINE SODIUM 125 UG/1
125 TABLET ORAL DAILY
Qty: 30 TABLET | Refills: 0 | Status: SHIPPED | OUTPATIENT
Start: 2025-04-29

## 2025-04-29 NOTE — TELEPHONE ENCOUNTER
Please send a reminder to schedule a medication check for diabetes and hypothyroid.   Appointment is past due. She will need an appointment within the next 30 days.   Previsit lab tests are also ordered.   Thank you.   Kristen Kehr PA-C

## 2025-04-29 NOTE — LETTER
April 30, 2025    Wendy Dodd  [address]   [place] [address]    Dear Wendy,     We recently received a refill request for Levothyroxine 125 mcg tablets.  We have refilled this for a one time 30 day supply only because you are due for a:    Medication Recheck for diabetes and hypothyroid office visit within the next 30 days (appointment is past due.)    Previsit, fasting lab appointment also due.    Please call at your earliest convenience so that there will not be a delay with your future refills.    Thank you,         Your [facility] Care Team/bmc  [phone]          Electronically signed

## 2025-04-30 NOTE — TELEPHONE ENCOUNTER
Reminder letter mailed to patient with provider's message written verbatim.  Rima SULLIVAN    [facility]

## 2025-05-07 ENCOUNTER — OFFICE VISIT (OUTPATIENT)
Dept: FAMILY MEDICINE | Facility: CLINIC | Age: 65
End: 2025-05-07
Payer: COMMERCIAL

## 2025-05-07 VITALS
BODY MASS INDEX: 31.37 KG/M2 | RESPIRATION RATE: 24 BRPM | TEMPERATURE: 96.6 F | HEIGHT: 68 IN | DIASTOLIC BLOOD PRESSURE: 84 MMHG | OXYGEN SATURATION: 97 % | WEIGHT: 207 LBS | SYSTOLIC BLOOD PRESSURE: 132 MMHG | HEART RATE: 83 BPM

## 2025-05-07 DIAGNOSIS — I10 BENIGN ESSENTIAL HYPERTENSION: ICD-10-CM

## 2025-05-07 DIAGNOSIS — E03.9 HYPOTHYROIDISM, UNSPECIFIED TYPE: ICD-10-CM

## 2025-05-07 DIAGNOSIS — E11.9 TYPE 2 DIABETES MELLITUS WITHOUT COMPLICATION, WITHOUT LONG-TERM CURRENT USE OF INSULIN (H): Primary | ICD-10-CM

## 2025-05-07 DIAGNOSIS — L30.4 INTERTRIGO: ICD-10-CM

## 2025-05-07 PROCEDURE — G2211 COMPLEX E/M VISIT ADD ON: HCPCS | Performed by: PHYSICIAN ASSISTANT

## 2025-05-07 PROCEDURE — 99214 OFFICE O/P EST MOD 30 MIN: CPT | Performed by: PHYSICIAN ASSISTANT

## 2025-05-07 PROCEDURE — 3075F SYST BP GE 130 - 139MM HG: CPT | Performed by: PHYSICIAN ASSISTANT

## 2025-05-07 PROCEDURE — 3079F DIAST BP 80-89 MM HG: CPT | Performed by: PHYSICIAN ASSISTANT

## 2025-05-07 PROCEDURE — 1126F AMNT PAIN NOTED NONE PRSNT: CPT | Performed by: PHYSICIAN ASSISTANT

## 2025-05-07 RX ORDER — NYSTATIN 100000 [USP'U]/G
POWDER TOPICAL 3 TIMES DAILY
Qty: 60 G | Refills: 11 | Status: SHIPPED | OUTPATIENT
Start: 2025-05-07

## 2025-05-07 RX ORDER — LEVOTHYROXINE SODIUM 125 UG/1
125 TABLET ORAL DAILY
Qty: 90 TABLET | Refills: 3 | Status: SHIPPED | OUTPATIENT
Start: 2025-05-07

## 2025-05-07 RX ORDER — LISINOPRIL 40 MG/1
40 TABLET ORAL DAILY
Qty: 90 TABLET | Refills: 3 | Status: SHIPPED | OUTPATIENT
Start: 2025-05-07

## 2025-05-07 RX ORDER — ATORVASTATIN CALCIUM 10 MG/1
10 TABLET, FILM COATED ORAL DAILY
Qty: 90 TABLET | Refills: 3 | Status: SHIPPED | OUTPATIENT
Start: 2025-05-07

## 2025-05-07 RX ORDER — METFORMIN HYDROCHLORIDE 500 MG/1
TABLET, EXTENDED RELEASE ORAL
Qty: 360 TABLET | Refills: 3 | Status: SHIPPED | OUTPATIENT
Start: 2025-05-07

## 2025-05-07 ASSESSMENT — PAIN SCALES - GENERAL: PAINLEVEL_OUTOF10: NO PAIN (0)

## 2025-05-07 NOTE — PROGRESS NOTES
"  Assessment & Plan     Type 2 diabetes mellitus without complication, without long-term current use of insulin (H)  She will continue with the metformin.   Since shelter, she has gained 20 pounds. Her A1c also shows that and is up at 7.3%   She will work on healthy lifestyle changes and plan to have her A1C checked again with a lab only appointment in 6 months.   I will see her back in 1 year.   Continue with the metformin and atorvastatin.   Refills given   - atorvastatin (LIPITOR) 10 MG tablet; Take 1 tablet (10 mg) by mouth daily.  - metFORMIN (GLUCOPHAGE XR) 500 MG 24 hr tablet; TAKE TWO TABLETS BY MOUTH TWICE A DAY    Intertrigo  Stable, continue with regular use of the powder  - nystatin (MYCOSTATIN) 966788 UNIT/GM external powder; Apply topically 3 times daily.    Hypothyroidism, unspecified type  Thyroid levels reviewed and normal. Continue with the levothyroxine 125 mcg daily  - levothyroxine (SYNTHROID/LEVOTHROID) 125 MCG tablet; Take 1 tablet (125 mcg) by mouth daily.    Benign essential hypertension  Stable, refills given   - lisinopril (ZESTRIL) 40 MG tablet; Take 1 tablet (40 mg) by mouth daily.      The longitudinal plan of care for the diagnosis(es)/condition(s) as documented were addressed during this visit. Due to the added complexity in care, I will continue to support Wendy in the subsequent management and with ongoing continuity of care.      BMI  Estimated body mass index is 31.47 kg/m  as calculated from the following:    Height as of this encounter: 1.727 m (5' 8\").    Weight as of this encounter: 93.9 kg (207 lb).   Weight management plan: Discussed healthy diet and exercise guidelines discussed GLP1 medications, she defers due to cost and will be working on making lifestyle changes.           Subjective   Wendy is a 64 year old, presenting for the following health issues:  Recheck Medication        5/7/2025    11:05 AM   Additional Questions   Roomed by Demetrius SOLIS MA   Accompanied by "      Via the Health Maintenance questionnaire, the patient has reported the following services have been completed -Mammogram: [facility] at [facility] 2023-05-05, this information has been sent to the abstraction team.  History of Present Illness       Diabetes:   She presents for follow up of diabetes.  She is checking home blood glucose a few times a month.   She checks blood glucose before meals.  Blood glucose is never over 200 and never under 70.  When her blood glucose is low, the patient is asymptomatic for confusion, blurred vision, lethargy and reports not feeling dizzy, shaky, or weak.   She has no concerns regarding her diabetes at this time.   She is not experiencing numbness or burning in feet, excessive thirst, blurry vision, weight changes or redness, sores or blisters on feet. The patient has had a diabetic eye exam in the last 12 months. Eye exam performed on 05/05/2024. [facility]        Hypertension: She presents for follow up of hypertension.  She does not check blood pressure  regularly outside of the clinic. Outpatient blood pressures have not been over 140/90. She does not follow a low salt diet.     She eats 0-1 servings of fruits and vegetables daily.She consumes 0 sweetened beverage(s) daily.She exercises with enough effort to increase her heart rate 9 or less minutes per day.  She exercises with enough effort to increase her heart rate 3 or less days per week.   She is taking medications regularly.        Wendy is here today for check of chronic health conditions and refills of medications:    Type 2 diabetes: managed with metformin.   Intertrigo: she will need a refill of the nystatin powder, this works well for her  Hypothyroid: managed with levothyroxine 125 mcg  Hypertension: managed with lisinopril   Hyperlipidemia: managed with atorvastatin            Review of Systems  Constitutional, HEENT, cardiovascular, pulmonary, GI, ,  "musculoskeletal, neuro, skin, endocrine and psych systems are negative, except as otherwise noted.      Objective    /84   Pulse 83   Temp (!) 96.6  F (35.9  C) (Tympanic)   Resp 24   Ht 1.727 m (5' 8\")   Wt 93.9 kg (207 lb)   LMP  (LMP Unknown)   SpO2 97%   Breastfeeding No   BMI 31.47 kg/m    Body mass index is 31.47 kg/m .  Physical Exam   GENERAL: alert and no distress  EYES: Eyes grossly normal to inspection, PERRL and conjunctivae and sclerae normal  NECK: no adenopathy, no asymmetry, masses, or scars  RESP: lungs clear to auscultation - no rales, rhonchi or wheezes  CV: regular rate and rhythm, normal S1 S2, no S3 or S4, no murmur, click or rub, no peripheral edema  MS: no gross musculoskeletal defects noted, no edema  SKIN: no suspicious lesions or rashes  NEURO: Normal strength and tone, mentation intact and speech normal  PSYCH: mentation appears normal, affect normal/bright  Diabetic foot exam: normal DP and PT pulses and no trophic changes or ulcerative lesions    Lab on 05/02/2025   Component Date Value Ref Range Status    Cholesterol 05/02/2025 122  <200 mg/dL Final    Triglycerides 05/02/2025 129  <150 mg/dL Final    Direct Measure HDL 05/02/2025 46 (L)  >=50 mg/dL Final    LDL Cholesterol Calculated 05/02/2025 50  <100 mg/dL Final    Non HDL Cholesterol 05/02/2025 76  <130 mg/dL Final    Patient Fasting > 8hrs? 05/02/2025 Yes   Final    TSH 05/02/2025 0.56  0.30 - 4.20 uIU/mL Final    Sodium 05/02/2025 144  135 - 145 mmol/L Final    Potassium 05/02/2025 4.7  3.4 - 5.3 mmol/L Final    Chloride 05/02/2025 105  98 - 107 mmol/L Final    Carbon Dioxide (CO2) 05/02/2025 27  22 - 29 mmol/L Final    Anion Gap 05/02/2025 12  7 - 15 mmol/L Final    Urea Nitrogen 05/02/2025 13.6  8.0 - 23.0 mg/dL Final    Creatinine 05/02/2025 0.70  0.51 - 0.95 mg/dL Final    GFR Estimate 05/02/2025 >90  >60 mL/min/1.73m2 Final    eGFR calculated using 2021 CKD-EPI equation.    Calcium 05/02/2025 10.3  8.8 - 10.4 " mg/dL Final    Glucose 05/02/2025 133 (H)  70 - 99 mg/dL Final    Patient Fasting > 8hrs? 05/02/2025 Yes   Final    Creatinine Urine mg/dL 05/02/2025 144.3  mg/dL Final    The reference ranges have not been established in urine creatinine. The results should be integrated into the clinical context for interpretation.    Albumin Urine mg/L 05/02/2025 59.9  mg/L Final    The reference ranges have not been established in urine albumin. The results should be integrated into the clinical context for interpretation.    Albumin Urine mg/g Cr 05/02/2025 41.51 (H)  0.00 - 25.00 mg/g Cr Final    Microalbuminuria is defined as an albumin:creatinine ratio of 17 to 299 for males and 25 to 299 for females. A ratio of albumin:creatinine of 300 or higher is indicative of overt proteinuria.  Due to biologic variability, positive results should be confirmed by a second, first-morning random or 24-hour timed urine specimen. If there is discrepancy, a third specimen is recommended. When 2 out of 3 results are in the microalbuminuria range, this is evidence for incipient nephropathy and warrants increased efforts at glucose control, blood pressure control, and institution of therapy with an angiotensin-converting-enzyme (ACE) inhibitor (if the patient can tolerate it).      Estimated Average Glucose 05/02/2025 174 (H)  <117 mg/dL Final    Hemoglobin A1C 05/02/2025 7.7 (H)  0.0 - 5.6 % Final    Normal <5.7%   Prediabetes 5.7-6.4%    Diabetes 6.5% or higher     Note: Adopted from ADA consensus guidelines.           Signed Electronically by: Kristen M. Kehr, PA-C

## 2025-05-07 NOTE — PATIENT INSTRUCTIONS
Start back on regular exercise and healthy diet regimen.     Plan to have an A1C (lab only appointment in November / December) nonfasting appointment.     I will see you in 1 year.

## 2025-06-14 ENCOUNTER — HEALTH MAINTENANCE LETTER (OUTPATIENT)
Age: 65
End: 2025-06-14

## (undated) DEVICE — KIT ENDO TURNOVER/PROCEDURE CARRY-ON 101822

## (undated) DEVICE — NIM PROBE NS STD INCR PRASS TIP STRL LF DISP 8225825X

## (undated) DEVICE — SURGICEL HEMOSTAT 4X8" 1952

## (undated) DEVICE — Device

## (undated) DEVICE — TAPE MICROFOAM 4" 1528-4

## (undated) DEVICE — TUBING STRYKER IRRIGATION CASSETTE 5400-050-001

## (undated) DEVICE — DRAPE POUCH INSTRUMENT 1018

## (undated) DEVICE — BUR STRK CARBIDE ROUND 3.0MM EXT 5820-110-330C

## (undated) DEVICE — OINTMENT ANTIBIOTIC BACITRACIN ZINC .9 G 1171

## (undated) DEVICE — TUBING SUCTION 12"X1/4" N612

## (undated) DEVICE — PREP SKIN SCRUB TRAY 4461A

## (undated) DEVICE — CATH TRAY FOLEY SURESTEP 16FR W/TMP PRB STLK LATEX A319416AM

## (undated) DEVICE — BUR STRK CARBIDE ROUND 4.0MM 5820-110-040C

## (undated) DEVICE — BUR STRK 2.3MM TAPERED ROUTER - FA2 5407-FA2-023

## (undated) DEVICE — DRAPE MICROSCOPE LEICA 54X120" 09-MK653

## (undated) DEVICE — SPONGE COTTONOID 1/2X1/2" 20-04S

## (undated) DEVICE — SU SILK 2-0 TIE 12X30" A305H

## (undated) DEVICE — LUBRICATING JELLY 4.25OZ

## (undated) DEVICE — SPONGE COTTONOID 1/2X3" 20-07S

## (undated) DEVICE — SYR 10ML FINGER CONTROL W/O NDL 309695

## (undated) DEVICE — DRAPE POUCH IRR 1016

## (undated) DEVICE — BUR STRK CARBIDE ROUND 5.0MM 5820-110-050C

## (undated) DEVICE — SU PROLENE 6-0 BV-1DA 18" 8709H

## (undated) DEVICE — GLOVE BIOGEL PI MICRO SZ 7.0 48570

## (undated) DEVICE — NDL ANGIOCATH 14GA 1.25" 4048

## (undated) DEVICE — SYR 10ML LL W/O NDL 302995

## (undated) DEVICE — PREP POVIDONE-IODINE 10% SOLUTION 4OZ BOTTLE MDS093944

## (undated) DEVICE — PACK GOWN 3/PK DISP XL SBA32GPFCB

## (undated) DEVICE — PREP CHLORAPREP CLEAR 3ML 930400

## (undated) DEVICE — STPL SKIN 35W ROTATING HEAD PRW35

## (undated) DEVICE — DRAPE U SPLIT 74X120" 29440

## (undated) DEVICE — PACK CRANIOTOMY

## (undated) DEVICE — DRAIN SYSTEM CSF EXTERNAL DRAINAGE VENTRIC/LUMBAR 46914

## (undated) DEVICE — DRAPE MAYO STAND 23X54 8337

## (undated) DEVICE — SU ETHILON 3-0 PS-1 18" 1663H

## (undated) DEVICE — DECANTER BAG 2002S

## (undated) DEVICE — ESU CORD BIPOLAR AND IRR TUBING AESCULAP US355

## (undated) DEVICE — SUCTION MANIFOLD NEPTUNE 2 SYS 4 PORT 0702-020-000

## (undated) DEVICE — ESU HOLSTER PLASTIC DISP E2400

## (undated) DEVICE — DRSG GAUZE 4X4" TRAY 6939

## (undated) DEVICE — SU NUROLON 4-0 TF CR 8X18" C584D

## (undated) DEVICE — ESU GROUND PAD ADULT W/CORD E7507

## (undated) DEVICE — DRAPE IOBAN INCISE 23X17" 6650EZ

## (undated) DEVICE — LINEN TOWEL PACK X6 WHITE 5487

## (undated) DEVICE — NIM ELEC SUBDERMAL NDL 3PAIR/BOX

## (undated) DEVICE — SOL WATER IRRIG 1000ML BOTTLE 2F7114

## (undated) DEVICE — DRAPE EYE SHEET 8441

## (undated) DEVICE — SU MONOCRYL 3-0 PS-1 27" Y936H

## (undated) DEVICE — DRAPE SHEET REV FOLD 3/4 9349

## (undated) DEVICE — PAD CHUX UNDERPAD 23X24" 7136

## (undated) DEVICE — SPONGE COTTONOID 1/2X1 1/2" 20-06S

## (undated) DEVICE — SOL NACL 0.9% 10ML VIAL 0409-4888-02

## (undated) DEVICE — SPONGE SURGIFOAM 100 1974

## (undated) DEVICE — DRAPE CORETEMP FLUID WARM SYSTEM 62X56IN CTD200

## (undated) DEVICE — SPONGE COTTONOID 1X3" 20-10S

## (undated) DEVICE — SPONGE SURGIFOAM 01GM POWDER 1978

## (undated) DEVICE — SPONGE COTTONOID 1/4X1/4" 20-01S

## (undated) DEVICE — SU VICRYL 2-0 CT-2 CR 8X18" J726D

## (undated) DEVICE — SLEEVE IRRIGATION STRK ELITE 7.0CM 5/PKG 5407-010-450

## (undated) DEVICE — WIPES FOLEY CARE SURESTEP PROVON DFC100

## (undated) DEVICE — PREP POVIDONE-IODINE 7.5% SCRUB 4OZ BOTTLE MDS093945

## (undated) DEVICE — BLADE KNIFE BEAVER MICROSHARP GREEN 377515

## (undated) DEVICE — SOL RINGERS LACTATED 1000ML BAG 07953-09

## (undated) DEVICE — SYR 30ML LL W/O NDL 302832

## (undated) DEVICE — DRAPE CRANIOTOMY W/POUCH 9450

## (undated) DEVICE — SOL WATER IRRIG 1000ML BOTTLE 07139-09

## (undated) DEVICE — PIN SKULL MAYFIELD ADULT TITANIUM 3/PK A1120

## (undated) DEVICE — RX VISTASEAL FIBRIN SEALANT W/THROMBIN 10ML VST10

## (undated) DEVICE — CATH LUMBAR 60CM 46419

## (undated) DEVICE — LINEN TOWEL PACK X30 5481

## (undated) DEVICE — GLOVE EXAM NITRILE LG

## (undated) DEVICE — DECANTER VIAL 2006S

## (undated) RX ORDER — ONDANSETRON 2 MG/ML
INJECTION INTRAMUSCULAR; INTRAVENOUS
Status: DISPENSED
Start: 2023-11-06

## (undated) RX ORDER — LABETALOL HYDROCHLORIDE 5 MG/ML
INJECTION, SOLUTION INTRAVENOUS
Status: DISPENSED
Start: 2023-11-06

## (undated) RX ORDER — DEXAMETHASONE SODIUM PHOSPHATE 4 MG/ML
INJECTION, SOLUTION INTRA-ARTICULAR; INTRALESIONAL; INTRAMUSCULAR; INTRAVENOUS; SOFT TISSUE
Status: DISPENSED
Start: 2023-11-06

## (undated) RX ORDER — FENTANYL CITRATE 50 UG/ML
INJECTION, SOLUTION INTRAMUSCULAR; INTRAVENOUS
Status: DISPENSED
Start: 2023-11-06

## (undated) RX ORDER — CLINDAMYCIN PHOSPHATE 900 MG/50ML
INJECTION, SOLUTION INTRAVENOUS
Status: DISPENSED
Start: 2023-11-06

## (undated) RX ORDER — FENTANYL CITRATE 50 UG/ML
INJECTION, SOLUTION INTRAMUSCULAR; INTRAVENOUS
Status: DISPENSED
Start: 2023-11-10

## (undated) RX ORDER — ACETAMINOPHEN 325 MG/1
TABLET ORAL
Status: DISPENSED
Start: 2023-11-06

## (undated) RX ORDER — PROPOFOL 10 MG/ML
INJECTION, EMULSION INTRAVENOUS
Status: DISPENSED
Start: 2023-11-06

## (undated) RX ORDER — BUPIVACAINE HYDROCHLORIDE AND EPINEPHRINE 2.5; 5 MG/ML; UG/ML
INJECTION, SOLUTION EPIDURAL; INFILTRATION; INTRACAUDAL; PERINEURAL
Status: DISPENSED
Start: 2023-11-06

## (undated) RX ORDER — HYDROMORPHONE HYDROCHLORIDE 1 MG/ML
INJECTION, SOLUTION INTRAMUSCULAR; INTRAVENOUS; SUBCUTANEOUS
Status: DISPENSED
Start: 2023-11-06

## (undated) RX ORDER — FENTANYL CITRATE-0.9 % NACL/PF 10 MCG/ML
PLASTIC BAG, INJECTION (ML) INTRAVENOUS
Status: DISPENSED
Start: 2023-11-06